# Patient Record
Sex: FEMALE | Race: WHITE | NOT HISPANIC OR LATINO | Employment: UNEMPLOYED | URBAN - METROPOLITAN AREA
[De-identification: names, ages, dates, MRNs, and addresses within clinical notes are randomized per-mention and may not be internally consistent; named-entity substitution may affect disease eponyms.]

---

## 2017-01-03 DIAGNOSIS — M79.639 PAIN OF FOREARM: ICD-10-CM

## 2017-01-04 ENCOUNTER — HOSPITAL ENCOUNTER (OUTPATIENT)
Dept: RADIOLOGY | Facility: HOSPITAL | Age: 62
Discharge: HOME | End: 2017-01-04
Attending: INTERNAL MEDICINE
Payer: COMMERCIAL

## 2017-01-04 ENCOUNTER — TRANSCRIBE ORDERS (OUTPATIENT)
Dept: RADIOLOGY | Facility: HOSPITAL | Age: 62
End: 2017-01-04

## 2017-01-04 DIAGNOSIS — M79.639 PAIN OF FOREARM: ICD-10-CM

## 2017-01-04 PROCEDURE — 73080 X-RAY EXAM OF ELBOW: CPT

## 2017-01-04 PROCEDURE — 73090 X-RAY EXAM OF FOREARM: CPT

## 2017-02-06 ENCOUNTER — HOSPITAL ENCOUNTER (OUTPATIENT)
Dept: RADIOLOGY | Age: 62
Discharge: HOME | End: 2017-02-06
Payer: COMMERCIAL

## 2017-02-06 DIAGNOSIS — Z12.31 ENCOUNTER FOR SCREENING MAMMOGRAM FOR MALIGNANT NEOPLASM OF BREAST: ICD-10-CM

## 2017-02-06 PROCEDURE — G0202 SCR MAMMO BI INCL CAD: HCPCS

## 2017-11-01 DIAGNOSIS — E55.9 VITAMIN D DEFICIENCY: ICD-10-CM

## 2017-11-01 DIAGNOSIS — Z12.11 ENCOUNTER FOR SCREENING FOR MALIGNANT NEOPLASM OF COLON: ICD-10-CM

## 2017-11-01 DIAGNOSIS — R26.89 OTHER ABNORMALITIES OF GAIT AND MOBILITY: ICD-10-CM

## 2017-11-01 DIAGNOSIS — R19.5 OTHER FECAL ABNORMALITIES: ICD-10-CM

## 2017-11-01 DIAGNOSIS — E78.5 HYPERLIPIDEMIA: ICD-10-CM

## 2017-11-01 DIAGNOSIS — M81.0 AGE-RELATED OSTEOPOROSIS WITHOUT CURRENT PATHOLOGICAL FRACTURE: ICD-10-CM

## 2017-11-01 DIAGNOSIS — K21.9 GASTRO-ESOPHAGEAL REFLUX DISEASE WITHOUT ESOPHAGITIS: ICD-10-CM

## 2017-11-09 ENCOUNTER — APPOINTMENT (OUTPATIENT)
Dept: LAB | Facility: CLINIC | Age: 62
End: 2017-11-09
Payer: COMMERCIAL

## 2017-11-09 DIAGNOSIS — K21.9 GASTRO-ESOPHAGEAL REFLUX DISEASE WITHOUT ESOPHAGITIS: ICD-10-CM

## 2017-11-09 DIAGNOSIS — R26.89 OTHER ABNORMALITIES OF GAIT AND MOBILITY: ICD-10-CM

## 2017-11-09 DIAGNOSIS — Z12.11 ENCOUNTER FOR SCREENING FOR MALIGNANT NEOPLASM OF COLON: ICD-10-CM

## 2017-11-09 DIAGNOSIS — E55.9 VITAMIN D DEFICIENCY: ICD-10-CM

## 2017-11-09 DIAGNOSIS — E78.5 HYPERLIPIDEMIA: ICD-10-CM

## 2017-11-09 LAB
25(OH)D3 SERPL-MCNC: 73.6 NG/ML (ref 30–100)
ALBUMIN SERPL BCP-MCNC: 4 G/DL (ref 3.5–5)
ALP SERPL-CCNC: 64 U/L (ref 46–116)
ALT SERPL W P-5'-P-CCNC: 29 U/L (ref 12–78)
ANION GAP SERPL CALCULATED.3IONS-SCNC: 4 MMOL/L (ref 4–13)
AST SERPL W P-5'-P-CCNC: 31 U/L (ref 5–45)
BACTERIA UR QL AUTO: ABNORMAL /HPF
BASOPHILS # BLD AUTO: 0.06 THOUSANDS/ΜL (ref 0–0.1)
BASOPHILS NFR BLD AUTO: 1 % (ref 0–1)
BILIRUB SERPL-MCNC: 0.34 MG/DL (ref 0.2–1)
BILIRUB UR QL STRIP: NEGATIVE
BUN SERPL-MCNC: 13 MG/DL (ref 5–25)
CALCIUM SERPL-MCNC: 9.8 MG/DL (ref 8.3–10.1)
CHLORIDE SERPL-SCNC: 102 MMOL/L (ref 100–108)
CHOLEST SERPL-MCNC: 186 MG/DL (ref 50–200)
CLARITY UR: CLEAR
CO2 SERPL-SCNC: 32 MMOL/L (ref 21–32)
COLOR UR: YELLOW
CREAT SERPL-MCNC: 0.67 MG/DL (ref 0.6–1.3)
EOSINOPHIL # BLD AUTO: 0.14 THOUSAND/ΜL (ref 0–0.61)
EOSINOPHIL NFR BLD AUTO: 3 % (ref 0–6)
ERYTHROCYTE [DISTWIDTH] IN BLOOD BY AUTOMATED COUNT: 13.4 % (ref 11.6–15.1)
GFR SERPL CREATININE-BSD FRML MDRD: 97 ML/MIN/1.73SQ M
GLUCOSE P FAST SERPL-MCNC: 73 MG/DL (ref 65–99)
GLUCOSE UR STRIP-MCNC: NEGATIVE MG/DL
HCT VFR BLD AUTO: 43.8 % (ref 34.8–46.1)
HDLC SERPL-MCNC: 75 MG/DL (ref 40–60)
HGB BLD-MCNC: 14.4 G/DL (ref 11.5–15.4)
HGB UR QL STRIP.AUTO: NEGATIVE
HYALINE CASTS #/AREA URNS LPF: ABNORMAL /LPF
KETONES UR STRIP-MCNC: NEGATIVE MG/DL
LDLC SERPL CALC-MCNC: 102 MG/DL (ref 0–100)
LEUKOCYTE ESTERASE UR QL STRIP: ABNORMAL
LYMPHOCYTES # BLD AUTO: 1.54 THOUSANDS/ΜL (ref 0.6–4.47)
LYMPHOCYTES NFR BLD AUTO: 33 % (ref 14–44)
MCH RBC QN AUTO: 30.6 PG (ref 26.8–34.3)
MCHC RBC AUTO-ENTMCNC: 32.9 G/DL (ref 31.4–37.4)
MCV RBC AUTO: 93 FL (ref 82–98)
MONOCYTES # BLD AUTO: 0.46 THOUSAND/ΜL (ref 0.17–1.22)
MONOCYTES NFR BLD AUTO: 10 % (ref 4–12)
NEUTROPHILS # BLD AUTO: 2.48 THOUSANDS/ΜL (ref 1.85–7.62)
NEUTS SEG NFR BLD AUTO: 53 % (ref 43–75)
NITRITE UR QL STRIP: NEGATIVE
NON-SQ EPI CELLS URNS QL MICRO: ABNORMAL /HPF
NRBC BLD AUTO-RTO: 0 /100 WBCS
PH UR STRIP.AUTO: 7.5 [PH] (ref 4.5–8)
PLATELET # BLD AUTO: 284 THOUSANDS/UL (ref 149–390)
PMV BLD AUTO: 10.5 FL (ref 8.9–12.7)
POTASSIUM SERPL-SCNC: 4 MMOL/L (ref 3.5–5.3)
PROT SERPL-MCNC: 7.4 G/DL (ref 6.4–8.2)
PROT UR STRIP-MCNC: NEGATIVE MG/DL
RBC # BLD AUTO: 4.71 MILLION/UL (ref 3.81–5.12)
RBC #/AREA URNS AUTO: ABNORMAL /HPF
SODIUM SERPL-SCNC: 138 MMOL/L (ref 136–145)
SP GR UR STRIP.AUTO: 1.01 (ref 1–1.03)
T4 FREE SERPL-MCNC: 0.91 NG/DL (ref 0.76–1.46)
TRIGL SERPL-MCNC: 43 MG/DL
TSH SERPL DL<=0.05 MIU/L-ACNC: 1.4 UIU/ML (ref 0.36–3.74)
UROBILINOGEN UR QL STRIP.AUTO: 0.2 E.U./DL
VIT B12 SERPL-MCNC: 982 PG/ML (ref 100–900)
WBC # BLD AUTO: 4.68 THOUSAND/UL (ref 4.31–10.16)
WBC #/AREA URNS AUTO: ABNORMAL /HPF

## 2017-11-09 PROCEDURE — 80053 COMPREHEN METABOLIC PANEL: CPT

## 2017-11-09 PROCEDURE — 85025 COMPLETE CBC W/AUTO DIFF WBC: CPT

## 2017-11-09 PROCEDURE — 84439 ASSAY OF FREE THYROXINE: CPT

## 2017-11-09 PROCEDURE — 80061 LIPID PANEL: CPT

## 2017-11-09 PROCEDURE — 82306 VITAMIN D 25 HYDROXY: CPT

## 2017-11-09 PROCEDURE — 84443 ASSAY THYROID STIM HORMONE: CPT

## 2017-11-09 PROCEDURE — 81001 URINALYSIS AUTO W/SCOPE: CPT

## 2017-11-09 PROCEDURE — 36415 COLL VENOUS BLD VENIPUNCTURE: CPT

## 2017-11-09 PROCEDURE — 82607 VITAMIN B-12: CPT

## 2017-11-10 ENCOUNTER — APPOINTMENT (OUTPATIENT)
Dept: LAB | Facility: CLINIC | Age: 62
End: 2017-11-10
Payer: COMMERCIAL

## 2017-11-10 DIAGNOSIS — K21.9 GASTRO-ESOPHAGEAL REFLUX DISEASE WITHOUT ESOPHAGITIS: ICD-10-CM

## 2017-11-10 DIAGNOSIS — Z12.11 ENCOUNTER FOR SCREENING FOR MALIGNANT NEOPLASM OF COLON: ICD-10-CM

## 2017-11-10 DIAGNOSIS — E55.9 VITAMIN D DEFICIENCY: ICD-10-CM

## 2017-11-10 DIAGNOSIS — E78.5 HYPERLIPIDEMIA: ICD-10-CM

## 2017-11-10 LAB — HEMOCCULT STL QL IA: POSITIVE

## 2017-11-10 PROCEDURE — G0328 FECAL BLOOD SCRN IMMUNOASSAY: HCPCS

## 2017-11-16 ENCOUNTER — ALLSCRIPTS OFFICE VISIT (OUTPATIENT)
Dept: OTHER | Facility: OTHER | Age: 62
End: 2017-11-16

## 2018-01-16 NOTE — PROGRESS NOTES
Assessment    1  Borderline hyperlipidemia (272 4) (E78 5)    Plan  Health Maintenance    · EKG/ECG- POC; Status:Complete - Retrospective By Protocol Authorization;   Done:  [id number] 01:19PM    Patient was instructed about dietary adjustments to compensate for her mild hyperlipidemia  Discussion/Summary  In summary this is an annual physical examination for this 53-year-old female patient  She presents today with no active complaints or medical issues  Her physical examination appears to be very stable with no evidence of any significant changes from last year's examination  She has a mild elevation in her total cholesterol and LDL compared to one year ago we discussed the dietary adjustments to compensate for this  She already is in a healthy body mass index range and also has regular exercise habits  See her in one years time for her next regular maintenance examination  She was provided with a flu vaccine today  Chief Complaint  PATIENT IS HERE FOR A YEARLY PHYSICAL  History of Present Illness  , Adult Female: The patient is being seen for a health maintenance evaluation  The last health maintenance visit was 1 year(s) ago  Social History: Household members include spouse  She is   The patient has never smoked cigarettes  She reports occasional alcohol use  The patient has no concerns about alcohol abuse  General Health: The patient's health since the last visit is described as good  She has regular dental visits  She denies vision problems  She denies hearing loss  Immunizations status: up to date  Lifestyle:  She consumes a diverse and healthy diet  She does not have any weight concerns  She exercises regularly  She does not use tobacco  She denies alcohol use  She denies drug use  Screening: cancer screening reviewed and current  metabolic screening reviewed and current  risk screening reviewed and current  Active Problems    1   Abdominal pain, RLQ (right lower quadrant) (789 03) (R10 31)   2  Allergy To Gluten-containing Products (V15 05)   3  Anxiety (300 00) (F41 9)   4  Arthralgia of temporomandibular joint (524 62) (M26 62)   5  Borderline hyperlipidemia (272 4) (E78 5)   6  Colon cancer screening (V76 51) (Z12 11)   7  Encounter for screening mammogram for malignant neoplasm of breast (V76 12)   (Z12 31)   8  Esophageal reflux (530 81) (K21 9)   9  Herpes simplex infection (054 9) (B00 9)   10  Vitamin D deficiency (268 9) (E55 9)    Past Medical History    · History of Allergic Contact Dermatitis (692 9)   · History of Allergy to insect bites and stings (V15 06) (Z91 038)   · History of Cellulitis Of Left Lower Extremity (682 9)   · History of acute bronchitis (V12 69) (Z87 09)   · History of conjunctivitis (V12 49) (Z86 69)   · History of laryngitis (V12 69) (Z87 09)   · History of pharyngitis (V12 69) (Z87 09)   · History of Pain in joint of right shoulder region (719 41) (M25 511)   · History of Palpitations (785 1) (R00 2)   · History of Pelvic and perineal pain (625 9) (R10 2)   · History of Pneumonia (V12 61)   · History of Tick Bite - Tick Has Been Removed   · History of Urinary Tract Infection (V13 02)    Surgical History    · History of  Section   · History of Oral Surgery Tooth Extraction    Family History  Mother    · Family history of Glaucoma (V19 11)   · Family history of Heart Disease (V17 49)   · Family history of Hypertension (V17 49)  Family History    · Family history of Atrial Fibrillation   · Family history of Cancer   · Family history of Heart Disease (V17 49)   · Family history of Hypertension (V17 49)   · Family history of Parkinson Disease    Social History    · Alcohol Use (History)   · Daily Coffee Consumption (2  Cups/Day)   · Exercising Regularly   · Never A Smoker    Current Meds   1  Famciclovir 500 MG Oral Tablet; use as directed;    Therapy: 15GCQ3021 to (Evaluate:2014)  Requested for: 15DAF3205; Last   RI:[id number] Ordered    Allergies    1  No Known Drug Allergies    Vitals   Recorded: 17EPN4399 56:31NU   Systolic 518   Diastolic 76   Heart Rate 57   Respiration 14   Temperature 97 5 F   O2 Saturation 95   Height 5 ft 4 in   Weight 124 lb 0 64 oz   BMI Calculated 21 29   BSA Calculated 1 6     Physical Exam    Constitutional   General appearance: No acute distress, well appearing and well nourished  Head and Face   Head and face: Normal     Eyes   Conjunctiva and lids: No swelling, erythema or discharge  Pupils and irises: Equal, round, reactive to light  Ophthalmoscopic examination: Normal fundi and optic discs  Ears, Nose, Mouth, and Throat   External inspection of ears and nose: Normal     Otoscopic examination: Tympanic membranes translucent with normal light reflex  Canals patent without erythema  Nasal mucosa, septum, and turbinates: Normal without edema or erythema  Lips, teeth, and gums: Normal, good dentition  Oropharynx: Normal with no erythema, edema, exudate or lesions  Neck   Neck: Supple, symmetric, trachea midline, no masses  Thyroid: Normal, no thyromegaly  Pulmonary   Respiratory effort: No increased work of breathing or signs of respiratory distress  Percussion of chest: Normal     Auscultation of lungs: Clear to auscultation  Cardiovascular   Auscultation of heart: Normal rate and rhythm, normal S1 and S2, no murmurs  Carotid pulses: 2+ bilaterally  Pedal pulses: 2+ bilaterally  Peripheral vascular exam: Normal     Examination of extremities for edema and/or varicosities: Normal     Abdomen   Abdomen: Non-tender, no masses  Liver and spleen: No hepatomegaly or splenomegaly  Lymphatic   Palpation of lymph nodes in neck: No lymphadenopathy  Musculoskeletal   Gait and station: Normal     Digits and nails: Normal without clubbing or cyanosis      Joints, bones, and muscles: Normal     Range of motion: Normal     Stability: Normal     Muscle strength/tone: Normal     Skin   Skin and subcutaneous tissue: Normal without rashes or lesions  Neurologic   Cranial nerves: Cranial nerves II-XII intact  Cortical function: Normal mental status  Reflexes: 2+ and symmetric  Sensation: No sensory loss  Coordination: Normal finger to nose and heel to shin  Psychiatric   Judgment and insight: Normal     Orientation to person, place, and time: Normal     Recent and remote memory: Intact      Mood and affect: Normal        Results/Data  EKG/ECG- POC [id number] 01:19PM [facility]     Test Name Result Flag Reference   EKG/ECG 09/29/2016         Signatures   Electronically signed by : JEN Cheng ; Sep 29 2016  2:21PM EST                       (Author)

## 2018-01-18 NOTE — PROGRESS NOTES
Assessment    1  Heme positive stool (792 1) (R19 5)   2  Borderline hyperlipidemia (272 4) (E78 5)   3  Eczema (692 9) (L30 9)    Plan  Age related osteoporosis, Heme positive stool    · * DXA BONE DENSITY SPINE HIP AND PELVIS; Status:Hold For - Scheduling;  Requested for:16Nov2017;   Heme positive stool    · 2 - *EYVAZ&REILLYCOL ( COLORECTAL SURGERY, GASTROENTEROLOGY)  Co-Management  *  Status: Hold For - Scheduling   Requested for: 31HTH8921  Care Summary provided  : Yes    1  Heme-positive stool recommend colonoscopy the patient is referred to colon and rectal service at [facility] for this procedure  2   Borderline hyperlipidemia LDL is 102 with a total cholesterol 186 and HDL 75  Dietary adjustments recommended to the patient  3   Eczema of the hands recommend mometasone daily and moist arising cream such as Eucerin or Oak use working hands  Discussion/Summary    In summary the patient presents today for an annual health maintenance exam  She is doing well her physical examination appears to be stable echocardiogram is also stable  Reviewed with the patient her full set of blood work urinalysis and Hemoccult testing  The Hemoccult study was positive and she was referred on for a colonoscopy  She is current with her gyn and mammogram screening  We did provide her with a request for a DEXA scan to assess her bone density and fracture risk  total time of encounter was 45 minutes and 30 minutes was spent counseling  Chief Complaint  patient is here for a yearly physical       History of Present Illness  HM, Adult Female: The patient is being seen for a health maintenance evaluation  The last health maintenance visit was 1 year(s) ago  Social History: Household members include spouse  She is   The patient has never smoked cigarettes  She reports occasional alcohol use  The patient has no concerns about alcohol abuse  She has never used illicit drugs  General Health:  The patient's health since the last visit is described as good  She has regular dental visits  She denies vision problems  She denies hearing loss  Immunizations status: up to date  Lifestyle:  She consumes a diverse and healthy diet  She does not have any weight concerns  She exercises regularly  She does not use tobacco  She denies alcohol use  She denies drug use  Screening:   HPI: This 80-year-old female patient presents today for an annual health maintenance examination  She has no specific medical complaints  Review of Systems    Constitutional: No fever, no chills, feels well, no tiredness, no recent weight gain or weight loss  Eyes: No complaints of eye pain, no red eyes, no eyesight problems, no discharge, no dry eyes, no itching of eyes  ENT: no complaints of earache, no loss of hearing, no nose bleeds, no nasal discharge, no sore throat, no hoarseness  Cardiovascular: No complaints of slow heart rate, no fast heart rate, no chest pain, no palpitations, no leg claudication, no lower extremity edema  Respiratory: No complaints of shortness of breath, no wheezing, no cough, no SOB on exertion, no orthopnea, no PND  Gastrointestinal: No complaints of abdominal pain, no constipation, no nausea or vomiting, no diarrhea, no bloody stools  Genitourinary: No complaints of dysuria, no incontinence, no pelvic pain, no dysmenorrhea, no vaginal discharge or bleeding  Musculoskeletal: No complaints of arthralgias, no myalgias, no joint swelling or stiffness, no limb pain or swelling  Integumentary: Rash of the hands which is flaky and dry  Neurological: No complaints of headache, no confusion, no convulsions, no numbness, no dizziness or fainting, no tingling, no limb weakness, no difficulty walking  Psychiatric: Not suicidal, no sleep disturbance, no anxiety or depression, no change in personality, no emotional problems     Endocrine: No complaints of proptosis, no hot flashes, no muscle weakness, no deepening of the voice, no feelings of weakness  Hematologic/Lymphatic: No complaints of swollen glands, no swollen glands in the neck, does not bleed easily, does not bruise easily  Active Problems    1  Allergy To Gluten-containing Products (V15 05)   2  Anxiety (300 00) (F41 9)   3  Arthralgia of temporomandibular joint (524 62) (M26 629)   4  Borderline hyperlipidemia (272 4) (E78 5)   5  Colon cancer screening (V76 51) (Z12 11)   6  Encounter for screening mammogram for malignant neoplasm of breast (V76 12)   (Z12 31)   7  Esophageal reflux (530 81) (K21 9)   8  Forearm pain (729 5) (M79 639)   9  Need for immunization against influenza (V04 81) (Z23)   10  Rapid fatigue of gait (781 2) (R26 89)   11   Vitamin D deficiency (268 9) (E55 9)    Past Medical History    · History of Abdominal pain, RLQ (right lower quadrant) (789 03) (R10 31)   · History of Allergic Contact Dermatitis (692 9)   · History of Allergy to insect bites and stings (V15 06) (Z91 038)   · History of Cellulitis Of Left Lower Extremity (682 9)   · History of acute bronchitis (V12 69) (Z87 09)   · History of conjunctivitis (V12 49) (Z86 69)   · History of herpes simplex infection (V12 09) (Z86 19)   · History of laryngitis (V12 69) (Z87 09)   · History of pharyngitis (V12 69) (Z87 09)   · History of Pain in joint of right shoulder region (719 41) (M25 511)   · History of Palpitations (785 1) (R00 2)   · History of Pelvic and perineal pain (625 9) (R10 2)   · History of Pneumonia (V12 61)   · History of Tick Bite - Tick Has Been Removed   · History of Urinary Tract Infection (V13 02)    Surgical History    · History of  Section   · History of Oral Surgery Tooth Extraction    Family History  Mother    · Family history of Glaucoma (V19 11)   · Family history of Heart Disease (V17 49)   · Family history of Hypertension (V17 49)  Family History    · Family history of Atrial Fibrillation   · Family history of Cancer   · Family history of Heart Disease (V17 49)   · Family history of Hypertension (V17 49)   · Family history of Parkinson Disease    Social History    · Alcohol Use (History)   · Daily Coffee Consumption (2  Cups/Day)   · Exercising Regularly   · Never A Smoker    Current Meds   1  ALPRAZolam 0 25 MG Oral Tablet; take 1 tablet every 6 to 8 hours as needed; Therapy: 98GJF7543 to (Evaluate:06Apr2017); Last Rx:06Jan2017 Ordered    Allergies    1  No Known Drug Allergies    Vitals   Recorded: 08ATL4783 02:28PM   Temperature 98 9 F   Heart Rate 73   Respiration 14   Systolic 155   Diastolic 68   Height 5 ft 4 in   Weight 127 lb 0 2 oz   BMI Calculated 21 8   BSA Calculated 1 61   O2 Saturation 99     Physical Exam    Constitutional   General appearance: No acute distress, well appearing and well nourished  Head and Face   Head and face: Normal     Eyes   Conjunctiva and lids: No swelling, erythema or discharge  Pupils and irises: Equal, round, reactive to light  Ophthalmoscopic examination: Normal fundi and optic discs  Ears, Nose, Mouth, and Throat   External inspection of ears and nose: Normal     Otoscopic examination: Tympanic membranes translucent with normal light reflex  Canals patent without erythema  Hearing: Normal     Nasal mucosa, septum, and turbinates: Normal without edema or erythema  Lips, teeth, and gums: Normal, good dentition  Oropharynx: Normal with no erythema, edema, exudate or lesions  Neck   Neck: Supple, symmetric, trachea midline, no masses  Thyroid: Normal, no thyromegaly  Pulmonary   Respiratory effort: No increased work of breathing or signs of respiratory distress  Percussion of chest: Normal     Auscultation of lungs: Clear to auscultation  Cardiovascular   Auscultation of heart: Normal rate and rhythm, normal S1 and S2, no murmurs  Carotid pulses: 2+ bilaterally  Pedal pulses: 2+ bilaterally      Peripheral vascular exam: Normal     Examination of extremities for edema and/or varicosities: Normal     Abdomen   Abdomen: Non-tender, no masses  Liver and spleen: No hepatomegaly or splenomegaly  Lymphatic   Palpation of lymph nodes in neck: No lymphadenopathy  Musculoskeletal   Gait and station: Normal     Digits and nails: Normal without clubbing or cyanosis  Joints, bones, and muscles: Normal     Range of motion: Normal     Stability: Normal     Muscle strength/tone: Normal     Skin   Examination of the skin for lesions: Abnormal   Eczema of the hands  Neurologic   Cranial nerves: Cranial nerves II-XII intact  Cortical function: Normal mental status  Reflexes: 2+ and symmetric  Sensation: No sensory loss  Coordination: Normal finger to nose and heel to shin  Psychiatric   Judgment and insight: Normal     Orientation to person, place, and time: Normal     Recent and remote memory: Intact  Mood and affect: Normal        Results/Data  PHQ-2 Adult Depression Screening 04PJR6531 02:32PM User, [employer]     Test Name Result Flag Reference   PHQ-2 Adult Depression Score 0     Over the last two weeks, how often have you been bothered by any of the following problems? Little interest or pleasure in doing things: Not at all - 0  Feeling down, depressed, or hopeless: Not at all - 0   PHQ-2 Adult Depression Screening Negative       Falls Risk Assessment (Dx Z13 89 Screen for Neurologic Disorder) 02WMV2987 02:32PM User, [employer]     Test Name Result Flag Reference   Falls Risk      No falls in the past year       Future Appointments    Date/Time Provider Specialty Site   11/27/2018 02:00 PM JEN Mendieta   Internal Medicine [address] MED     Signatures   Electronically signed by : JEN Kelly ; Nov 16 2017  7:34PM EST                       (Author)

## 2018-01-22 VITALS
HEIGHT: 64 IN | OXYGEN SATURATION: 99 % | BODY MASS INDEX: 21.68 KG/M2 | RESPIRATION RATE: 14 BRPM | DIASTOLIC BLOOD PRESSURE: 68 MMHG | HEART RATE: 73 BPM | WEIGHT: 127.01 LBS | SYSTOLIC BLOOD PRESSURE: 122 MMHG | TEMPERATURE: 98.9 F

## 2018-03-19 ENCOUNTER — OFFICE VISIT (OUTPATIENT)
Dept: OBGYN CLINIC | Facility: CLINIC | Age: 63
End: 2018-03-19
Payer: COMMERCIAL

## 2018-03-19 VITALS
HEIGHT: 65 IN | SYSTOLIC BLOOD PRESSURE: 130 MMHG | WEIGHT: 130 LBS | BODY MASS INDEX: 21.66 KG/M2 | DIASTOLIC BLOOD PRESSURE: 84 MMHG

## 2018-03-19 DIAGNOSIS — Z12.39 BREAST CANCER SCREENING: ICD-10-CM

## 2018-03-19 DIAGNOSIS — Z01.419 ENCOUNTER FOR ANNUAL ROUTINE GYNECOLOGICAL EXAMINATION: Primary | ICD-10-CM

## 2018-03-19 PROCEDURE — S0610 ANNUAL GYNECOLOGICAL EXAMINA: HCPCS | Performed by: OBSTETRICS & GYNECOLOGY

## 2018-03-19 NOTE — PROGRESS NOTES
Assessment/Plan:    Pap smear with HPV done as well as annual   Encouraged self-breast examination as well as calcium supplementation  Continue annual mammogram, discussed 3D mammography given breast density  She will check to see if this is covered by her insurance  Discussed treatment options for urge incontinence  At this point she would like to just monitor her symptoms through the year  She will return to office in 1 year or as needed  No problem-specific Assessment & Plan notes found for this encounter  Diagnoses and all orders for this visit:    Encounter for annual routine gynecological examination  -     Thinprep Pap and HPV mRNA E6/E7    Breast cancer screening  -     Mammo screening bilateral w 3d & cad; Future    Other orders  -     VITAMIN C, CALCIUM ASCORBATE, PO; Take by mouth  -     Cholecalciferol 1000 units capsule; Take by mouth  -     b complex vitamins tablet; Take 1 tablet by mouth daily          Subjective:      Patient ID: Oneal Leavitt is a 63 y o  female  HPI    This is a 46-year-old female  ( x1,  x1) presents as a new patient for annual well gyn exam   She offers no complaints today  She went through menopause at age 54  She was never on hormone replacement therapy  She is sexually active and has been in a monogamous relationship with her  for 38 years  All her Pap smears have been normal  She is due for her mammogram   She did have a screening colonoscopy 2 years ago which was normal  She denies any changes in bowel or bladder function  The following portions of the patient's history were reviewed and updated as appropriate: allergies, current medications, past family history, past medical history, past social history, past surgical history and problem list     Review of Systems   Constitutional: Negative for fatigue, fever and unexpected weight change  Respiratory: Negative for cough, chest tightness, shortness of breath and wheezing  Cardiovascular: Negative  Negative for chest pain and palpitations  Gastrointestinal: Negative  Negative for abdominal distention, abdominal pain, blood in stool, constipation, diarrhea, nausea and vomiting  Genitourinary: Negative  Negative for difficulty urinating, dyspareunia, dysuria, flank pain, frequency, genital sores, hematuria, pelvic pain, urgency, vaginal bleeding, vaginal discharge and vaginal pain  Skin: Negative for rash  Objective:      /84   Ht 5' 5" (1 651 m)   Wt 59 kg (130 lb)   Breastfeeding? No   BMI 21 63 kg/m²          Physical Exam   Constitutional: She appears well-developed and well-nourished  Cardiovascular: Normal rate and regular rhythm  Pulmonary/Chest: Effort normal and breath sounds normal  Right breast exhibits no inverted nipple, no mass, no nipple discharge and no skin change  Left breast exhibits no inverted nipple, no mass, no nipple discharge and no skin change  Abdominal: Soft  Bowel sounds are normal  She exhibits no distension  There is no tenderness  There is no rebound and no guarding  Genitourinary: Rectum normal, vagina normal and uterus normal  There is no lesion on the right labia  There is no lesion on the left labia  Cervix exhibits no discharge and no friability  Right adnexum displays no mass, no tenderness and no fullness  Left adnexum displays no mass, no tenderness and no fullness  No vaginal discharge found

## 2018-03-20 LAB
CLINICAL INFO: NORMAL
DATE PREVIOUS BX: NORMAL
HPV E6+E7 MRNA CVX QL NAA+PROBE: NOT DETECTED
LMP START DATE: NORMAL
SL AMB PREV. PAP:: NORMAL
SPECIMEN SOURCE CVX/VAG CYTO: NORMAL
STAT OF ADQ CVX/VAG CYTO-IMP: NORMAL

## 2018-04-05 ENCOUNTER — TRANSCRIBE ORDERS (OUTPATIENT)
Dept: LAB | Facility: CLINIC | Age: 63
End: 2018-04-05

## 2018-04-09 ENCOUNTER — HOSPITAL ENCOUNTER (OUTPATIENT)
Dept: RADIOLOGY | Age: 63
Discharge: HOME | End: 2018-04-09
Payer: COMMERCIAL

## 2018-04-09 DIAGNOSIS — M81.0 AGE-RELATED OSTEOPOROSIS WITHOUT CURRENT PATHOLOGICAL FRACTURE: ICD-10-CM

## 2018-04-09 DIAGNOSIS — R19.5 OTHER FECAL ABNORMALITIES: ICD-10-CM

## 2018-04-09 DIAGNOSIS — Z12.39 BREAST CANCER SCREENING: ICD-10-CM

## 2018-04-09 PROCEDURE — 77067 SCR MAMMO BI INCL CAD: CPT

## 2018-04-09 PROCEDURE — 77080 DXA BONE DENSITY AXIAL: CPT

## 2018-04-09 PROCEDURE — 77063 BREAST TOMOSYNTHESIS BI: CPT

## 2018-04-13 ENCOUNTER — TELEPHONE (OUTPATIENT)
Dept: OBGYN CLINIC | Facility: CLINIC | Age: 63
End: 2018-04-13

## 2018-04-13 NOTE — TELEPHONE ENCOUNTER
----- Message from Radha Dear sent at 4/11/2018 10:10 AM EDT -----  Regarding: Test Results Question  Contact: [phone]  Dr Fanta Burdick when I will receive Test results of mammogram from 4/9th? Nothing showing in MyChart  Other test results show up quickly      Thank you,  Colette Lin

## 2018-04-13 NOTE — TELEPHONE ENCOUNTER
Lm pt's as re: 3D mgram results 4/9/18 - "extremely dense" breasts, no interval change - to continue 3D mgram annually, can have ABUS

## 2018-04-17 NOTE — TELEPHONE ENCOUNTER
Pt inforrmed of mgram results & pt also saw on My Chart - she will recall office if wants dx & CPT codes for ABUS & will continue 3D annual screening mgram

## 2018-04-18 ENCOUNTER — TELEPHONE (OUTPATIENT)
Dept: INTERNAL MEDICINE CLINIC | Facility: CLINIC | Age: 63
End: 2018-04-18

## 2018-04-25 ENCOUNTER — TELEPHONE (OUTPATIENT)
Dept: INTERNAL MEDICINE CLINIC | Facility: CLINIC | Age: 63
End: 2018-04-25

## 2018-04-25 NOTE — TELEPHONE ENCOUNTER
Pt wanted to know what she should do with the results of her dexa scan (med advice)  She can be reached at [phone]

## 2018-07-16 ENCOUNTER — TELEPHONE (OUTPATIENT)
Dept: INTERNAL MEDICINE CLINIC | Facility: CLINIC | Age: 63
End: 2018-07-16

## 2018-08-01 ENCOUNTER — TELEPHONE (OUTPATIENT)
Dept: INTERNAL MEDICINE CLINIC | Facility: CLINIC | Age: 63
End: 2018-08-01

## 2018-08-01 NOTE — TELEPHONE ENCOUNTER
Yesterday she Sat down for lunch, vision blurred in certain spots  Drank some water and felt better  But the episode lasted about 20 minutes  Is this something to be concerned with?

## 2018-08-03 ENCOUNTER — TELEPHONE (OUTPATIENT)
Dept: INTERNAL MEDICINE CLINIC | Facility: CLINIC | Age: 63
End: 2018-08-03

## 2018-08-03 DIAGNOSIS — M54.5 ACUTE BILATERAL LOW BACK PAIN, WITH SCIATICA PRESENCE UNSPECIFIED: Primary | ICD-10-CM

## 2018-08-03 NOTE — PROGRESS NOTES
Patient called today with symptoms of low back pain would like to have x-rays performed will order thoracic and lumbar x-ray films follow-up with patient

## 2018-08-03 NOTE — TELEPHONE ENCOUNTER
Patient is having low back pain, she said she discussed it with you and you told her if it persists she should contact you for an order for an xray  Would you please order that so she can go have the xray done

## 2018-08-03 NOTE — TELEPHONE ENCOUNTER
Nilda please let the patient know that she can go to [facility] ([facility]) radiology and the orders are in the system   thanks

## 2019-01-17 ENCOUNTER — OFFICE VISIT (OUTPATIENT)
Dept: INTERNAL MEDICINE CLINIC | Facility: CLINIC | Age: 64
End: 2019-01-17
Payer: COMMERCIAL

## 2019-01-17 VITALS
WEIGHT: 125 LBS | OXYGEN SATURATION: 98 % | HEART RATE: 76 BPM | HEIGHT: 64 IN | TEMPERATURE: 97.7 F | BODY MASS INDEX: 21.34 KG/M2 | SYSTOLIC BLOOD PRESSURE: 118 MMHG | RESPIRATION RATE: 14 BRPM | DIASTOLIC BLOOD PRESSURE: 64 MMHG

## 2019-01-17 DIAGNOSIS — N30.00 ACUTE CYSTITIS WITHOUT HEMATURIA: ICD-10-CM

## 2019-01-17 DIAGNOSIS — E78.5 HYPERLIPIDEMIA, UNSPECIFIED HYPERLIPIDEMIA TYPE: Primary | ICD-10-CM

## 2019-01-17 DIAGNOSIS — R53.83 OTHER FATIGUE: ICD-10-CM

## 2019-01-17 DIAGNOSIS — M81.0 AGE-RELATED OSTEOPOROSIS WITHOUT CURRENT PATHOLOGICAL FRACTURE: ICD-10-CM

## 2019-01-17 PROCEDURE — 99396 PREV VISIT EST AGE 40-64: CPT | Performed by: INTERNAL MEDICINE

## 2019-01-17 RX ORDER — DIPHENOXYLATE HYDROCHLORIDE AND ATROPINE SULFATE 2.5; .025 MG/1; MG/1
1 TABLET ORAL DAILY
COMMUNITY

## 2019-01-17 NOTE — PROGRESS NOTES
Assessment/Plan:    Age related osteoporosis  Age-related osteoporosis the patient continues on vitamin D and calcium supplementation  She has suffered no fractures at this time  Regular weight-bearing exercise is encouraged  Hyperlipidemia  Mild hyperlipidemia by history  We have requested an updated lipid profile and reviewed healthy diet with the patient  Diagnoses and all orders for this visit:    Hyperlipidemia, unspecified hyperlipidemia type  -     Lipid panel; Future    Other fatigue  -     CBC and differential; Future  -     Comprehensive metabolic panel; Future    Acute cystitis without hematuria  -     UA w Reflex to Microscopic w Reflex to Culture -Lab Collect    Age-related osteoporosis without current pathological fracture    Other orders  -     Omega-3 Fatty Acids (FISH OIL PO); Take 2 g by mouth  -     multivitamin (THERAGRAN) TABS; Take 1 tablet by mouth daily  -     Probiotic Product (PROBIOTIC-10 PO); Take by mouth        Subjective:      Patient ID: Javier Garcia is a 66 y o  female  This 60-year-old female patient presents today for an annual health maintenance examination  She has no new complaints of a medical or physical nature  She remains active physically and is maintaining a healthy body weight  She tries to maintain a healthy diet  She is up-to-date on her gynecologic screening as well as colon cancer screening  Blood work has been requested will review the results with the patient when the testing has been completed  The following portions of the patient's history were reviewed and updated as appropriate:   She  has a past medical history of Allergic contact dermatitis; Allergic to insect bites and stings; Conjunctivitis; Herpes simplex infection; Laryngitis; Pharyngitis; and Pneumonia    She   Patient Active Problem List    Diagnosis Date Noted    Hyperlipidemia 01/17/2019    Age related osteoporosis 11/16/2017     She  has a past surgical history that includes  section and Mouth surgery  Her family history includes Atrial fibrillation in her family; Cancer in her family; Glaucoma in her mother; Heart disease in her family and mother; Hypertension in her family and mother; Parkinsonism in her family  She  reports that she has never smoked  She has never used smokeless tobacco  She reports that she drinks alcohol  She reports that she does not use drugs  Current Outpatient Prescriptions   Medication Sig Dispense Refill    b complex vitamins tablet Take 1 tablet by mouth daily      Cholecalciferol 1000 units capsule Take by mouth      multivitamin (THERAGRAN) TABS Take 1 tablet by mouth daily      Omega-3 Fatty Acids (FISH OIL PO) Take 2 g by mouth      Probiotic Product (PROBIOTIC-10 PO) Take by mouth      VITAMIN C, CALCIUM ASCORBATE, PO Take by mouth       No current facility-administered medications for this visit       Review of Systems   All other systems reviewed and are negative  Objective:      /64   Pulse 76   Temp 97 7 °F (36 5 °C)   Resp 14   Ht 5' 4" (1 626 m)   Wt 56 7 kg (125 lb)   SpO2 98%   BMI 21 46 kg/m²          Physical Exam   Constitutional: She is oriented to person, place, and time  Vital signs are normal  She appears well-developed and well-nourished  She is cooperative  No distress  HENT:   Head: Normocephalic and atraumatic  Right Ear: Hearing, tympanic membrane, external ear and ear canal normal    Left Ear: Hearing, tympanic membrane, external ear and ear canal normal    Nose: Nose normal  No mucosal edema  Mouth/Throat: Uvula is midline, oropharynx is clear and moist and mucous membranes are normal  No oropharyngeal exudate  Eyes: Pupils are equal, round, and reactive to light  Conjunctivae and lids are normal  Right eye exhibits no discharge  Left eye exhibits no discharge  Neck: No JVD present  Carotid bruit is not present  No tracheal deviation present  No thyromegaly present  Cardiovascular: Normal rate, regular rhythm, normal heart sounds and intact distal pulses  No murmur heard  Pulmonary/Chest: Effort normal and breath sounds normal  No respiratory distress  She has no wheezes  She has no rales  She exhibits no tenderness  Abdominal: Soft  Normal appearance and bowel sounds are normal  She exhibits no distension and no mass  There is no tenderness  There is no rebound and no guarding  Musculoskeletal: Normal range of motion  She exhibits no edema, tenderness or deformity  Lymphadenopathy:     She has no cervical adenopathy  Neurological: She is alert and oriented to person, place, and time  She has normal reflexes  She displays normal reflexes  No cranial nerve deficit  Skin: Skin is warm, dry and intact  No rash noted  She is not diaphoretic  No erythema  No pallor  Psychiatric: She has a normal mood and affect  Her speech is normal and behavior is normal  Judgment and thought content normal  Cognition and memory are normal    Vitals reviewed

## 2019-01-17 NOTE — ASSESSMENT & PLAN NOTE
Mild hyperlipidemia by history  We have requested an updated lipid profile and reviewed healthy diet with the patient

## 2019-01-17 NOTE — ASSESSMENT & PLAN NOTE
Age-related osteoporosis the patient continues on vitamin D and calcium supplementation  She has suffered no fractures at this time  Regular weight-bearing exercise is encouraged

## 2019-01-18 ENCOUNTER — APPOINTMENT (OUTPATIENT)
Dept: LAB | Facility: CLINIC | Age: 64
End: 2019-01-18
Payer: COMMERCIAL

## 2019-01-18 ENCOUNTER — TRANSCRIBE ORDERS (OUTPATIENT)
Dept: LAB | Facility: CLINIC | Age: 64
End: 2019-01-18

## 2019-01-18 DIAGNOSIS — E78.5 HYPERLIPIDEMIA, UNSPECIFIED HYPERLIPIDEMIA TYPE: ICD-10-CM

## 2019-01-18 DIAGNOSIS — R53.83 OTHER FATIGUE: ICD-10-CM

## 2019-01-18 LAB
ALBUMIN SERPL BCP-MCNC: 3.9 G/DL (ref 3.5–5)
ALP SERPL-CCNC: 55 U/L (ref 46–116)
ALT SERPL W P-5'-P-CCNC: 28 U/L (ref 12–78)
ANION GAP SERPL CALCULATED.3IONS-SCNC: 6 MMOL/L (ref 4–13)
AST SERPL W P-5'-P-CCNC: 25 U/L (ref 5–45)
BACTERIA UR QL AUTO: ABNORMAL /HPF
BASOPHILS # BLD AUTO: 0.05 THOUSANDS/ΜL (ref 0–0.1)
BASOPHILS NFR BLD AUTO: 1 % (ref 0–1)
BILIRUB SERPL-MCNC: 0.64 MG/DL (ref 0.2–1)
BILIRUB UR QL STRIP: NEGATIVE
BUN SERPL-MCNC: 16 MG/DL (ref 5–25)
CALCIUM SERPL-MCNC: 8.9 MG/DL (ref 8.3–10.1)
CHLORIDE SERPL-SCNC: 100 MMOL/L (ref 100–108)
CHOLEST SERPL-MCNC: 199 MG/DL (ref 50–200)
CLARITY UR: CLEAR
CO2 SERPL-SCNC: 28 MMOL/L (ref 21–32)
COLOR UR: YELLOW
CREAT SERPL-MCNC: 0.58 MG/DL (ref 0.6–1.3)
EOSINOPHIL # BLD AUTO: 0.06 THOUSAND/ΜL (ref 0–0.61)
EOSINOPHIL NFR BLD AUTO: 1 % (ref 0–6)
ERYTHROCYTE [DISTWIDTH] IN BLOOD BY AUTOMATED COUNT: 12.8 % (ref 11.6–15.1)
GFR SERPL CREATININE-BSD FRML MDRD: 100 ML/MIN/1.73SQ M
GLUCOSE P FAST SERPL-MCNC: 81 MG/DL (ref 65–99)
GLUCOSE UR STRIP-MCNC: NEGATIVE MG/DL
HCT VFR BLD AUTO: 40.3 % (ref 34.8–46.1)
HDLC SERPL-MCNC: 72 MG/DL (ref 40–60)
HGB BLD-MCNC: 13.4 G/DL (ref 11.5–15.4)
HGB UR QL STRIP.AUTO: ABNORMAL
HYALINE CASTS #/AREA URNS LPF: ABNORMAL /LPF
IMM GRANULOCYTES # BLD AUTO: 0.01 THOUSAND/UL (ref 0–0.2)
IMM GRANULOCYTES NFR BLD AUTO: 0 % (ref 0–2)
KETONES UR STRIP-MCNC: NEGATIVE MG/DL
LDLC SERPL CALC-MCNC: 119 MG/DL (ref 0–100)
LEUKOCYTE ESTERASE UR QL STRIP: ABNORMAL
LYMPHOCYTES # BLD AUTO: 1.81 THOUSANDS/ΜL (ref 0.6–4.47)
LYMPHOCYTES NFR BLD AUTO: 32 % (ref 14–44)
MCH RBC QN AUTO: 30.7 PG (ref 26.8–34.3)
MCHC RBC AUTO-ENTMCNC: 33.3 G/DL (ref 31.4–37.4)
MCV RBC AUTO: 92 FL (ref 82–98)
MONOCYTES # BLD AUTO: 0.53 THOUSAND/ΜL (ref 0.17–1.22)
MONOCYTES NFR BLD AUTO: 9 % (ref 4–12)
NEUTROPHILS # BLD AUTO: 3.16 THOUSANDS/ΜL (ref 1.85–7.62)
NEUTS SEG NFR BLD AUTO: 57 % (ref 43–75)
NITRITE UR QL STRIP: NEGATIVE
NON-SQ EPI CELLS URNS QL MICRO: ABNORMAL /HPF
NONHDLC SERPL-MCNC: 127 MG/DL
NRBC BLD AUTO-RTO: 0 /100 WBCS
PH UR STRIP.AUTO: 6.5 [PH] (ref 4.5–8)
PLATELET # BLD AUTO: 246 THOUSANDS/UL (ref 149–390)
PMV BLD AUTO: 10.4 FL (ref 8.9–12.7)
POTASSIUM SERPL-SCNC: 4 MMOL/L (ref 3.5–5.3)
PROT SERPL-MCNC: 6.8 G/DL (ref 6.4–8.2)
PROT UR STRIP-MCNC: NEGATIVE MG/DL
RBC # BLD AUTO: 4.36 MILLION/UL (ref 3.81–5.12)
RBC #/AREA URNS AUTO: ABNORMAL /HPF
SODIUM SERPL-SCNC: 134 MMOL/L (ref 136–145)
SP GR UR STRIP.AUTO: 1.01 (ref 1–1.03)
TRIGL SERPL-MCNC: 42 MG/DL
UROBILINOGEN UR QL STRIP.AUTO: 0.2 E.U./DL
WBC # BLD AUTO: 5.62 THOUSAND/UL (ref 4.31–10.16)
WBC #/AREA URNS AUTO: ABNORMAL /HPF

## 2019-01-18 PROCEDURE — 36415 COLL VENOUS BLD VENIPUNCTURE: CPT

## 2019-01-18 PROCEDURE — 80061 LIPID PANEL: CPT

## 2019-01-18 PROCEDURE — 81001 URINALYSIS AUTO W/SCOPE: CPT | Performed by: INTERNAL MEDICINE

## 2019-01-18 PROCEDURE — 85025 COMPLETE CBC W/AUTO DIFF WBC: CPT

## 2019-01-18 PROCEDURE — 80053 COMPREHEN METABOLIC PANEL: CPT

## 2019-01-21 ENCOUNTER — TELEPHONE (OUTPATIENT)
Dept: INTERNAL MEDICINE CLINIC | Facility: CLINIC | Age: 64
End: 2019-01-21

## 2019-01-21 NOTE — TELEPHONE ENCOUNTER
Call the patient back reviewed the blood work did recommend some changes to her diet pertaining to her cholesterol but otherwise everything looks stable

## 2019-03-26 ENCOUNTER — ANNUAL EXAM (OUTPATIENT)
Dept: OBGYN CLINIC | Facility: CLINIC | Age: 64
End: 2019-03-26
Payer: COMMERCIAL

## 2019-03-26 VITALS
DIASTOLIC BLOOD PRESSURE: 70 MMHG | WEIGHT: 123.2 LBS | HEIGHT: 64 IN | BODY MASS INDEX: 21.03 KG/M2 | SYSTOLIC BLOOD PRESSURE: 110 MMHG

## 2019-03-26 DIAGNOSIS — Z01.419 ENCOUNTER FOR ANNUAL ROUTINE GYNECOLOGICAL EXAMINATION: Primary | ICD-10-CM

## 2019-03-26 DIAGNOSIS — Z12.39 BREAST CANCER SCREENING: ICD-10-CM

## 2019-03-26 PROCEDURE — S0612 ANNUAL GYNECOLOGICAL EXAMINA: HCPCS | Performed by: OBSTETRICS & GYNECOLOGY

## 2019-03-26 NOTE — PROGRESS NOTES
Assessment/Plan:    Pap smear deferred due to low risk status  Encouraged self-breast examination as well as calcium supplementation  Continue annual mammogram, discussed 3D mammography  She will check to see if this is covered by her insurance  She will continue to follow-up with her primary care physician as scheduled  Return to office in 1 year or p r n  No problem-specific Assessment & Plan notes found for this encounter  Diagnoses and all orders for this visit:    Encounter for annual routine gynecological examination    Breast cancer screening  -     Mammo screening bilateral w 3d & cad; Future    Other orders  -     Magnesium 100 MG CAPS; Take by mouth          Subjective:      Patient ID: Jorge Alberto Greenberg is a 66 y o  female  HPI     This is a 79-year-old female  ( x1,  x1) presents for annual gyn exam   Patient went through menopause at age 54  She has never been on hormone replacement therapy  She denies any vaginal bleeding or spotting  No changes in bowel or bladder function  She is sexually active and has been in a monogamous relationship with her  for over 39 years  All her Pap smears have been normal   Last Pap smear 2018 within normal limits  She underwent a screening colonoscopy last year which was normal, follow-up in 5 years  She does follow up with her primary care physician on a regular basis  She also follows up with a dermatologist once a year  She had a DEXA scan in 2018 which had revealed osteopenia, through her primary care physician  Patient does weight-bearing exercises on a daily basis  She does take supplemental calcium  There is a strong family history of osteoporosis      The following portions of the patient's history were reviewed and updated as appropriate: allergies, current medications, past family history, past medical history, past social history, past surgical history and problem list     Review of Systems   Constitutional: Negative for fatigue, fever and unexpected weight change  Respiratory: Negative for cough, chest tightness, shortness of breath and wheezing  Cardiovascular: Negative  Negative for chest pain and palpitations  Gastrointestinal: Negative  Negative for abdominal distention, abdominal pain, blood in stool, constipation, diarrhea, nausea and vomiting  Genitourinary: Negative  Negative for difficulty urinating, dyspareunia, dysuria, flank pain, frequency, genital sores, hematuria, pelvic pain, urgency, vaginal bleeding, vaginal discharge and vaginal pain  Skin: Negative for rash  Objective:      /70   Ht 5' 4" (1 626 m)   Wt 55 9 kg (123 lb 3 2 oz)   Breastfeeding? No   BMI 21 15 kg/m²          Physical Exam   Constitutional: She appears well-developed and well-nourished  Cardiovascular: Normal rate and regular rhythm  Pulmonary/Chest: Effort normal and breath sounds normal  Right breast exhibits no inverted nipple, no mass, no nipple discharge, no skin change and no tenderness  Left breast exhibits no inverted nipple, no mass, no nipple discharge, no skin change and no tenderness  Abdominal: Soft  Bowel sounds are normal  She exhibits no distension  There is no tenderness  There is no rebound and no guarding  Genitourinary: Vagina normal and uterus normal  There is no lesion on the right labia  There is no lesion on the left labia  Cervix exhibits no discharge and no friability  Right adnexum displays no mass, no tenderness and no fullness  Left adnexum displays no mass, no tenderness and no fullness  No vaginal discharge found  Genitourinary Comments: The vagina is evident of slight estrogen deficiency  There is no evidence of pelvic floor prolapse  Rectovaginal exam is confirmatory

## 2019-04-15 ENCOUNTER — HOSPITAL ENCOUNTER (OUTPATIENT)
Dept: RADIOLOGY | Age: 64
Discharge: HOME | End: 2019-04-15
Payer: COMMERCIAL

## 2019-04-15 VITALS — HEIGHT: 64 IN | BODY MASS INDEX: 21 KG/M2 | WEIGHT: 123 LBS

## 2019-04-15 DIAGNOSIS — Z12.39 BREAST CANCER SCREENING: ICD-10-CM

## 2019-04-15 PROCEDURE — 77063 BREAST TOMOSYNTHESIS BI: CPT

## 2019-04-15 PROCEDURE — 77067 SCR MAMMO BI INCL CAD: CPT

## 2019-05-20 ENCOUNTER — OFFICE VISIT (OUTPATIENT)
Dept: PHYSICAL THERAPY | Facility: REHABILITATION | Age: 64
End: 2019-05-20
Payer: COMMERCIAL

## 2019-05-20 DIAGNOSIS — M25.561 ACUTE PAIN OF RIGHT KNEE: Primary | ICD-10-CM

## 2019-05-20 PROCEDURE — 97161 PT EVAL LOW COMPLEX 20 MIN: CPT | Performed by: PHYSICAL THERAPIST

## 2019-05-20 PROCEDURE — 97112 NEUROMUSCULAR REEDUCATION: CPT | Performed by: PHYSICAL THERAPIST

## 2019-05-22 ENCOUNTER — OFFICE VISIT (OUTPATIENT)
Dept: PHYSICAL THERAPY | Facility: REHABILITATION | Age: 64
End: 2019-05-22
Payer: COMMERCIAL

## 2019-05-22 DIAGNOSIS — M25.561 ACUTE PAIN OF RIGHT KNEE: Primary | ICD-10-CM

## 2019-05-22 PROCEDURE — 97140 MANUAL THERAPY 1/> REGIONS: CPT | Performed by: PHYSICAL THERAPIST

## 2019-05-22 PROCEDURE — 97112 NEUROMUSCULAR REEDUCATION: CPT | Performed by: PHYSICAL THERAPIST

## 2019-05-30 ENCOUNTER — OFFICE VISIT (OUTPATIENT)
Dept: PHYSICAL THERAPY | Facility: REHABILITATION | Age: 64
End: 2019-05-30
Payer: COMMERCIAL

## 2019-05-30 DIAGNOSIS — M25.561 ACUTE PAIN OF RIGHT KNEE: Primary | ICD-10-CM

## 2019-05-30 PROCEDURE — 97140 MANUAL THERAPY 1/> REGIONS: CPT | Performed by: PHYSICAL THERAPIST

## 2019-05-30 PROCEDURE — 97530 THERAPEUTIC ACTIVITIES: CPT | Performed by: PHYSICAL THERAPIST

## 2019-05-30 PROCEDURE — 97112 NEUROMUSCULAR REEDUCATION: CPT | Performed by: PHYSICAL THERAPIST

## 2019-06-05 ENCOUNTER — OFFICE VISIT (OUTPATIENT)
Dept: PHYSICAL THERAPY | Facility: REHABILITATION | Age: 64
End: 2019-06-05
Payer: COMMERCIAL

## 2019-06-05 DIAGNOSIS — M25.561 ACUTE PAIN OF RIGHT KNEE: Primary | ICD-10-CM

## 2019-06-05 PROCEDURE — 97140 MANUAL THERAPY 1/> REGIONS: CPT | Performed by: PHYSICAL THERAPIST

## 2019-06-05 PROCEDURE — 97112 NEUROMUSCULAR REEDUCATION: CPT | Performed by: PHYSICAL THERAPIST

## 2019-06-13 ENCOUNTER — OFFICE VISIT (OUTPATIENT)
Dept: PHYSICAL THERAPY | Facility: REHABILITATION | Age: 64
End: 2019-06-13
Payer: COMMERCIAL

## 2019-06-13 DIAGNOSIS — M25.561 ACUTE PAIN OF RIGHT KNEE: Primary | ICD-10-CM

## 2019-06-13 PROCEDURE — 97140 MANUAL THERAPY 1/> REGIONS: CPT | Performed by: PHYSICAL THERAPIST

## 2019-06-13 PROCEDURE — 97112 NEUROMUSCULAR REEDUCATION: CPT | Performed by: PHYSICAL THERAPIST

## 2020-01-09 ENCOUNTER — TELEPHONE (OUTPATIENT)
Dept: INTERNAL MEDICINE CLINIC | Facility: CLINIC | Age: 65
End: 2020-01-09

## 2020-01-09 DIAGNOSIS — R39.9 UTI SYMPTOMS: ICD-10-CM

## 2020-01-09 DIAGNOSIS — E78.5 HYPERLIPIDEMIA, UNSPECIFIED HYPERLIPIDEMIA TYPE: Primary | ICD-10-CM

## 2020-01-09 DIAGNOSIS — Z13.1 SCREENING FOR DIABETES MELLITUS: ICD-10-CM

## 2020-01-09 DIAGNOSIS — Z13.0 SCREENING FOR DEFICIENCY ANEMIA: ICD-10-CM

## 2020-01-09 DIAGNOSIS — Z12.11 SCREENING FOR COLON CANCER: ICD-10-CM

## 2020-01-09 NOTE — TELEPHONE ENCOUNTER
Please let the patient know that orders have been sent to the [facility] lab she should fast for 10-12 hours prior to going in for her blood work thank you

## 2020-02-06 ENCOUNTER — APPOINTMENT (OUTPATIENT)
Dept: LAB | Facility: CLINIC | Age: 65
End: 2020-02-06
Payer: COMMERCIAL

## 2020-02-06 ENCOUNTER — TRANSCRIBE ORDERS (OUTPATIENT)
Dept: LAB | Facility: CLINIC | Age: 65
End: 2020-02-06

## 2020-02-06 DIAGNOSIS — Z13.1 SCREENING FOR DIABETES MELLITUS: ICD-10-CM

## 2020-02-06 DIAGNOSIS — E78.5 HYPERLIPIDEMIA, UNSPECIFIED HYPERLIPIDEMIA TYPE: ICD-10-CM

## 2020-02-06 DIAGNOSIS — Z13.0 SCREENING FOR DEFICIENCY ANEMIA: ICD-10-CM

## 2020-02-06 LAB
ALBUMIN SERPL BCP-MCNC: 4 G/DL (ref 3.5–5)
ALP SERPL-CCNC: 61 U/L (ref 46–116)
ALT SERPL W P-5'-P-CCNC: 24 U/L (ref 12–78)
ANION GAP SERPL CALCULATED.3IONS-SCNC: 1 MMOL/L (ref 4–13)
AST SERPL W P-5'-P-CCNC: 22 U/L (ref 5–45)
BACTERIA UR QL AUTO: NORMAL /HPF
BASOPHILS # BLD AUTO: 0.05 THOUSANDS/ΜL (ref 0–0.1)
BASOPHILS NFR BLD AUTO: 1 % (ref 0–1)
BILIRUB SERPL-MCNC: 0.36 MG/DL (ref 0.2–1)
BILIRUB UR QL STRIP: NEGATIVE
BUN SERPL-MCNC: 17 MG/DL (ref 5–25)
CALCIUM SERPL-MCNC: 9.4 MG/DL (ref 8.3–10.1)
CHLORIDE SERPL-SCNC: 102 MMOL/L (ref 100–108)
CHOLEST SERPL-MCNC: 189 MG/DL (ref 50–200)
CLARITY UR: CLEAR
CO2 SERPL-SCNC: 32 MMOL/L (ref 21–32)
COLOR UR: YELLOW
CREAT SERPL-MCNC: 0.68 MG/DL (ref 0.6–1.3)
EOSINOPHIL # BLD AUTO: 0.09 THOUSAND/ΜL (ref 0–0.61)
EOSINOPHIL NFR BLD AUTO: 2 % (ref 0–6)
ERYTHROCYTE [DISTWIDTH] IN BLOOD BY AUTOMATED COUNT: 12.9 % (ref 11.6–15.1)
GFR SERPL CREATININE-BSD FRML MDRD: 94 ML/MIN/1.73SQ M
GLUCOSE P FAST SERPL-MCNC: 87 MG/DL (ref 65–99)
GLUCOSE UR STRIP-MCNC: NEGATIVE MG/DL
HCT VFR BLD AUTO: 41.9 % (ref 34.8–46.1)
HDLC SERPL-MCNC: 76 MG/DL
HGB BLD-MCNC: 13.6 G/DL (ref 11.5–15.4)
HGB UR QL STRIP.AUTO: ABNORMAL
HYALINE CASTS #/AREA URNS LPF: NORMAL /LPF
IMM GRANULOCYTES # BLD AUTO: 0.01 THOUSAND/UL (ref 0–0.2)
IMM GRANULOCYTES NFR BLD AUTO: 0 % (ref 0–2)
KETONES UR STRIP-MCNC: NEGATIVE MG/DL
LDLC SERPL CALC-MCNC: 105 MG/DL (ref 0–100)
LEUKOCYTE ESTERASE UR QL STRIP: ABNORMAL
LYMPHOCYTES # BLD AUTO: 1.46 THOUSANDS/ΜL (ref 0.6–4.47)
LYMPHOCYTES NFR BLD AUTO: 27 % (ref 14–44)
MCH RBC QN AUTO: 30 PG (ref 26.8–34.3)
MCHC RBC AUTO-ENTMCNC: 32.5 G/DL (ref 31.4–37.4)
MCV RBC AUTO: 93 FL (ref 82–98)
MONOCYTES # BLD AUTO: 0.54 THOUSAND/ΜL (ref 0.17–1.22)
MONOCYTES NFR BLD AUTO: 10 % (ref 4–12)
NEUTROPHILS # BLD AUTO: 3.17 THOUSANDS/ΜL (ref 1.85–7.62)
NEUTS SEG NFR BLD AUTO: 60 % (ref 43–75)
NITRITE UR QL STRIP: NEGATIVE
NON-SQ EPI CELLS URNS QL MICRO: NORMAL /HPF
NONHDLC SERPL-MCNC: 113 MG/DL
NRBC BLD AUTO-RTO: 0 /100 WBCS
PH UR STRIP.AUTO: 7 [PH]
PLATELET # BLD AUTO: 228 THOUSANDS/UL (ref 149–390)
PMV BLD AUTO: 9.7 FL (ref 8.9–12.7)
POTASSIUM SERPL-SCNC: 4.2 MMOL/L (ref 3.5–5.3)
PROT SERPL-MCNC: 7 G/DL (ref 6.4–8.2)
PROT UR STRIP-MCNC: NEGATIVE MG/DL
RBC # BLD AUTO: 4.53 MILLION/UL (ref 3.81–5.12)
RBC #/AREA URNS AUTO: NORMAL /HPF
SODIUM SERPL-SCNC: 135 MMOL/L (ref 136–145)
SP GR UR STRIP.AUTO: 1.01 (ref 1–1.03)
TRIGL SERPL-MCNC: 41 MG/DL
UROBILINOGEN UR QL STRIP.AUTO: 0.2 E.U./DL
WBC # BLD AUTO: 5.32 THOUSAND/UL (ref 4.31–10.16)
WBC #/AREA URNS AUTO: NORMAL /HPF

## 2020-02-06 PROCEDURE — 85025 COMPLETE CBC W/AUTO DIFF WBC: CPT

## 2020-02-06 PROCEDURE — G0328 FECAL BLOOD SCRN IMMUNOASSAY: HCPCS

## 2020-02-06 PROCEDURE — 81001 URINALYSIS AUTO W/SCOPE: CPT | Performed by: INTERNAL MEDICINE

## 2020-02-06 PROCEDURE — 36415 COLL VENOUS BLD VENIPUNCTURE: CPT

## 2020-02-06 PROCEDURE — 80053 COMPREHEN METABOLIC PANEL: CPT

## 2020-02-06 PROCEDURE — 80061 LIPID PANEL: CPT

## 2020-02-13 ENCOUNTER — OFFICE VISIT (OUTPATIENT)
Dept: INTERNAL MEDICINE CLINIC | Facility: CLINIC | Age: 65
End: 2020-02-13
Payer: COMMERCIAL

## 2020-02-13 VITALS
BODY MASS INDEX: 21.51 KG/M2 | OXYGEN SATURATION: 98 % | RESPIRATION RATE: 16 BRPM | HEART RATE: 67 BPM | WEIGHT: 126 LBS | TEMPERATURE: 97.8 F | SYSTOLIC BLOOD PRESSURE: 104 MMHG | DIASTOLIC BLOOD PRESSURE: 62 MMHG | HEIGHT: 64 IN

## 2020-02-13 DIAGNOSIS — Z01.84 IMMUNITY STATUS TESTING: ICD-10-CM

## 2020-02-13 DIAGNOSIS — E87.1 HYPONATREMIA: ICD-10-CM

## 2020-02-13 DIAGNOSIS — E78.5 HYPERLIPIDEMIA, UNSPECIFIED HYPERLIPIDEMIA TYPE: Primary | ICD-10-CM

## 2020-02-13 DIAGNOSIS — M85.89 OSTEOPENIA OF MULTIPLE SITES: ICD-10-CM

## 2020-02-13 PROBLEM — M81.0 AGE RELATED OSTEOPOROSIS: Status: RESOLVED | Noted: 2017-11-16 | Resolved: 2020-02-13

## 2020-02-13 PROCEDURE — 99396 PREV VISIT EST AGE 40-64: CPT | Performed by: INTERNAL MEDICINE

## 2020-02-13 NOTE — ASSESSMENT & PLAN NOTE
The patient is not sure she had measles mumps and rubella during childhood and is requesting testing will schedule her for a MMR immunity screening study

## 2020-02-13 NOTE — ASSESSMENT & PLAN NOTE
A review of blood work indicates very mild hyponatremia recommend slight increase in salt consumption in the diet  The patient has no symptoms secondary to this mild hyponatremia  A follow-up assessment in 1 year is recommended

## 2020-02-13 NOTE — ASSESSMENT & PLAN NOTE
Very mild hyper lipidemia is noted I recommend that the patient continue on a low-cholesterol diet and also continue with Omega 3 fatty acid supplementation 2 g daily  A follow-up assessment of lipids is recommended in 1 year

## 2020-02-13 NOTE — PROGRESS NOTES
Assessment/Plan:    Osteopenia of multiple sites  Of the patient's most recent DEXA scan shows osteopenia at multiple sites  Recommend that she continue with weight-bearing exercise and also continue with calcium supplementation 1000 mg of calcium carbonate daily along with 2000 units of vitamin-D  Hyponatremia  A review of blood work indicates very mild hyponatremia recommend slight increase in salt consumption in the diet  The patient has no symptoms secondary to this mild hyponatremia  A follow-up assessment in 1 year is recommended  Hyperlipidemia  Very mild hyper lipidemia is noted I recommend that the patient continue on a low-cholesterol diet and also continue with Omega 3 fatty acid supplementation 2 g daily  A follow-up assessment of lipids is recommended in 1 year  Immunity status testing  The patient is not sure she had measles mumps and rubella during childhood and is requesting testing will schedule her for a MMR immunity screening study  Diagnoses and all orders for this visit:    Immunity status testing  -     Measles/Mumps/Rubella Immunity; Future    Hyperlipidemia, unspecified hyperlipidemia type        Subjective:      Patient ID: Juvenal Rose is a 60 y o  female  This 72-year-old female patient presents today for an annual complete physical examination and review of blood work  She has been in good health over the past year reporting only occasional sleep issues of  She indicates that she falls asleep fine but sometimes wakes up and has a difficult time falling back to sleep  She occasionally uses Tylenol p m  Patsi Reil She finds that melatonin causes too sedation the following day  She has no complaints of any chest pains palpitations or shortness of breath  She has regular follow-up visits with her gynecologist and is up-to-date on her mammogram screening for breast cancer  She is current on her present colonoscopy screening for colon cancer        The following portions of the patient's history were reviewed and updated as appropriate:   She  has a past medical history of Allergic contact dermatitis, Allergic to insect bites and stings, Conjunctivitis, Herpes simplex infection, Laryngitis, Pharyngitis, and Pneumonia  She   Patient Active Problem List    Diagnosis Date Noted    Hyponatremia 2020    Osteopenia of multiple sites 2020    Immunity status testing 2020    Hyperlipidemia 2019     She  has a past surgical history that includes  section and Mouth surgery  Her family history includes Atrial fibrillation in her family; Cancer in her family; Glaucoma in her mother; Heart disease in her family and mother; Hypertension in her family and mother; Parkinsonism in her family  She  reports that she has never smoked  She has never used smokeless tobacco  She reports that she drinks alcohol  She reports that she does not use drugs  Current Outpatient Medications   Medication Sig Dispense Refill    b complex vitamins tablet Take 1 tablet by mouth daily      Cholecalciferol 1000 units capsule Take 2,000 Units by mouth daily      Magnesium 100 MG CAPS Take by mouth      multivitamin (THERAGRAN) TABS Take 1 tablet by mouth daily      Omega-3 Fatty Acids (FISH OIL PO) Take 2 g by mouth      VITAMIN C, CALCIUM ASCORBATE, PO Take by mouth       No current facility-administered medications for this visit       Review of Systems   Psychiatric/Behavioral: Positive for sleep disturbance  All other systems reviewed and are negative  Objective:      /62   Pulse 67   Temp 97 8 °F (36 6 °C)   Resp 16   Ht 5' 4" (1 626 m)   Wt 57 2 kg (126 lb)   SpO2 98%   BMI 21 63 kg/m²          Physical Exam   Constitutional: She is oriented to person, place, and time  Vital signs are normal  She appears well-developed and well-nourished  She is cooperative  No distress     HENT:   Right Ear: Hearing, tympanic membrane, external ear and ear canal normal    Left Ear: Hearing, tympanic membrane, external ear and ear canal normal    Nose: Nose normal  No mucosal edema  Mouth/Throat: Uvula is midline, oropharynx is clear and moist and mucous membranes are normal  No oropharyngeal exudate  Eyes: Pupils are equal, round, and reactive to light  Conjunctivae and lids are normal  Right eye exhibits no discharge  Left eye exhibits no discharge  Neck: No JVD present  Carotid bruit is not present  No thyromegaly present  Cardiovascular: Normal rate, regular rhythm, normal heart sounds and intact distal pulses  No murmur heard  Pulmonary/Chest: Effort normal and breath sounds normal  No stridor  No respiratory distress  She has no wheezes  She has no rales  Abdominal: Soft  Normal appearance and bowel sounds are normal  She exhibits no distension and no mass  There is no tenderness  There is no rebound and no guarding  Musculoskeletal: Normal range of motion  She exhibits no edema, tenderness or deformity  Lymphadenopathy:     She has no cervical adenopathy  Neurological: She is alert and oriented to person, place, and time  She has normal reflexes  She displays normal reflexes  No cranial nerve deficit or sensory deficit  She exhibits normal muscle tone  Coordination normal    Skin: Skin is warm, dry and intact  No rash noted  She is not diaphoretic  No erythema  No pallor  Psychiatric: She has a normal mood and affect  Her speech is normal and behavior is normal  Judgment and thought content normal  Cognition and memory are normal    Vitals reviewed

## 2020-02-13 NOTE — ASSESSMENT & PLAN NOTE
Of the patient's most recent DEXA scan shows osteopenia at multiple sites    Recommend that she continue with weight-bearing exercise and also continue with calcium supplementation 1000 mg of calcium carbonate daily along with 2000 units of vitamin-D

## 2020-02-25 ENCOUNTER — APPOINTMENT (OUTPATIENT)
Dept: LAB | Facility: CLINIC | Age: 65
End: 2020-02-25
Payer: COMMERCIAL

## 2020-02-25 DIAGNOSIS — Z01.84 IMMUNITY STATUS TESTING: ICD-10-CM

## 2020-02-25 LAB — RUBV IGG SERPL IA-ACNC: >175 IU/ML

## 2020-02-25 PROCEDURE — 86735 MUMPS ANTIBODY: CPT

## 2020-02-25 PROCEDURE — 36415 COLL VENOUS BLD VENIPUNCTURE: CPT

## 2020-02-25 PROCEDURE — 86762 RUBELLA ANTIBODY: CPT

## 2020-02-25 PROCEDURE — 86765 RUBEOLA ANTIBODY: CPT

## 2020-02-27 LAB
MEV IGG SER QL: NORMAL
MUV IGG SER QL: NORMAL

## 2020-06-25 ENCOUNTER — HOSPITAL ENCOUNTER (OUTPATIENT)
Dept: RADIOLOGY | Age: 65
Discharge: HOME | End: 2020-06-25
Payer: COMMERCIAL

## 2020-06-25 VITALS — BODY MASS INDEX: 21.51 KG/M2 | WEIGHT: 126 LBS | HEIGHT: 64 IN

## 2020-06-25 DIAGNOSIS — Z12.31 ENCOUNTER FOR SCREENING MAMMOGRAM FOR MALIGNANT NEOPLASM OF BREAST: ICD-10-CM

## 2020-06-25 PROCEDURE — 77063 BREAST TOMOSYNTHESIS BI: CPT

## 2020-06-25 PROCEDURE — 77067 SCR MAMMO BI INCL CAD: CPT

## 2020-08-19 ENCOUNTER — ANNUAL EXAM (OUTPATIENT)
Dept: OBGYN CLINIC | Facility: CLINIC | Age: 65
End: 2020-08-19
Payer: COMMERCIAL

## 2020-08-19 VITALS
HEIGHT: 64 IN | BODY MASS INDEX: 22.02 KG/M2 | WEIGHT: 129 LBS | SYSTOLIC BLOOD PRESSURE: 112 MMHG | DIASTOLIC BLOOD PRESSURE: 70 MMHG | TEMPERATURE: 98 F

## 2020-08-19 DIAGNOSIS — M85.80 OSTEOPENIA, UNSPECIFIED LOCATION: ICD-10-CM

## 2020-08-19 DIAGNOSIS — Z01.419 ENCOUNTER FOR ANNUAL ROUTINE GYNECOLOGICAL EXAMINATION: Primary | ICD-10-CM

## 2020-08-19 DIAGNOSIS — Z12.39 BREAST CANCER SCREENING: ICD-10-CM

## 2020-08-19 PROCEDURE — 99396 PREV VISIT EST AGE 40-64: CPT | Performed by: OBSTETRICS & GYNECOLOGY

## 2020-08-19 PROCEDURE — 3008F BODY MASS INDEX DOCD: CPT | Performed by: OBSTETRICS & GYNECOLOGY

## 2020-08-19 PROCEDURE — 3725F SCREEN DEPRESSION PERFORMED: CPT | Performed by: OBSTETRICS & GYNECOLOGY

## 2020-08-19 PROCEDURE — 1036F TOBACCO NON-USER: CPT | Performed by: OBSTETRICS & GYNECOLOGY

## 2020-08-19 RX ORDER — PROPRANOLOL/HYDROCHLOROTHIAZID 40 MG-25MG
TABLET ORAL
COMMUNITY
Start: 2019-12-01 | End: 2021-06-25

## 2020-08-19 NOTE — PROGRESS NOTES
Assessment/Plan:  Pap smear deferred due to low risk status  Encouraged self-breast examination as well as calcium supplementation  Continue annual mammogram    Recommend DEXA scan follow-up osteopenia Reviewed colon cancer screening done 2018, follow-up 5 years  She will continue to follow-up with her family physician as scheduled  Return to office in 1 year or p r n  No problem-specific Assessment & Plan notes found for this encounter  Diagnoses and all orders for this visit:    Encounter for annual routine gynecological examination    Breast cancer screening  -     Mammo screening bilateral w 3d & cad; Future    Osteopenia, unspecified location  -     DXA bone density spine hip and pelvis; Future    Other orders  -     Turmeric (Curcumin 95) 500 MG CAPS          Subjective:      Patient ID: Varun Rush is a 60 y o  female  HPI     This is a very pleasant 72-year-old female  ( x1,  x1) presents for annual gyn exam   Patient offers no complaints today  She went through menopause at age 54  She has never been on hormone replacement therapy  She denies any vaginal bleeding or spotting  There has been no changes in bowel or bladder function  Patient has been in a monogamous relationship with her  for over 40 years  Her Pap smears have been normal   Last Pap smear 2018  She underwent screening colonoscopy in 2018 within normal limits with follow-up in 5 years  She continues to follow-up with her family physician and dermatologist on an annual basis  Patient underwent DEXA scan in 2018 revealing osteopenia  She does exercise on a daily basis and takes calcium      The following portions of the patient's history were reviewed and updated as appropriate: allergies, current medications, past family history, past medical history, past social history, past surgical history and problem list     Review of Systems   Constitutional: Negative for fatigue, fever and unexpected weight change  Respiratory: Negative for cough, chest tightness, shortness of breath and wheezing  Cardiovascular: Negative  Negative for chest pain and palpitations  Gastrointestinal: Negative  Negative for abdominal distention, abdominal pain, blood in stool, constipation, diarrhea, nausea and vomiting  Genitourinary: Negative  Negative for difficulty urinating, dyspareunia, dysuria, flank pain, frequency, genital sores, hematuria, pelvic pain, urgency, vaginal bleeding, vaginal discharge and vaginal pain  Skin: Negative for rash  Objective:      /70   Temp 98 °F (36 7 °C)   Ht 5' 4" (1 626 m)   Wt 58 5 kg (129 lb)   BMI 22 14 kg/m²          Physical Exam  Constitutional:       Appearance: She is well-developed  Cardiovascular:      Rate and Rhythm: Normal rate and regular rhythm  Pulmonary:      Effort: Pulmonary effort is normal       Breath sounds: Normal breath sounds  Chest:      Breasts:         Right: No inverted nipple, mass, nipple discharge, skin change or tenderness  Left: No inverted nipple, mass, nipple discharge, skin change or tenderness  Abdominal:      General: Bowel sounds are normal  There is no distension  Palpations: Abdomen is soft  Tenderness: There is no abdominal tenderness  There is no guarding or rebound  Genitourinary:     Labia:         Right: No lesion  Left: No lesion  Vagina: Normal  No vaginal discharge  Cervix: No discharge or friability  Adnexa:         Right: No mass, tenderness or fullness  Left: No mass, tenderness or fullness  Comments: External genitalia is within normal limits  The vagina is evident of estrogen deficiency  There is no evidence of pelvic floor prolapse  Rectovaginal exam is confirmatory

## 2020-09-21 ENCOUNTER — HOSPITAL ENCOUNTER (OUTPATIENT)
Dept: RADIOLOGY | Age: 65
Discharge: HOME | End: 2020-09-21
Payer: COMMERCIAL

## 2020-09-21 DIAGNOSIS — M85.80 OSTEOPENIA, UNSPECIFIED LOCATION: ICD-10-CM

## 2020-09-21 PROCEDURE — 77080 DXA BONE DENSITY AXIAL: CPT

## 2020-09-23 ENCOUNTER — TELEPHONE (OUTPATIENT)
Dept: OBGYN CLINIC | Facility: CLINIC | Age: 65
End: 2020-09-23

## 2020-09-23 NOTE — TELEPHONE ENCOUNTER
----- Message from Rock Alfaro DO sent at 9/23/2020  1:35 PM EDT -----  Inform pt dexa reveals stable osteopenia, cont ca+ and vit d, repeat dexa 2 yrs

## 2020-10-01 NOTE — TELEPHONE ENCOUNTER
Tried to call but says number not working now  Left message on Caren garcia () to have her give us a call

## 2020-11-04 ENCOUNTER — EVALUATION (OUTPATIENT)
Dept: PHYSICAL THERAPY | Facility: REHABILITATION | Age: 65
End: 2020-11-04
Payer: COMMERCIAL

## 2020-11-05 ENCOUNTER — OFFICE VISIT (OUTPATIENT)
Dept: PHYSICAL THERAPY | Facility: REHABILITATION | Age: 65
End: 2020-11-05
Payer: COMMERCIAL

## 2020-11-05 DIAGNOSIS — M25.552 LEFT HIP PAIN: Primary | ICD-10-CM

## 2020-11-05 PROCEDURE — 97161 PT EVAL LOW COMPLEX 20 MIN: CPT | Performed by: PHYSICAL THERAPIST

## 2020-11-18 ENCOUNTER — OFFICE VISIT (OUTPATIENT)
Dept: PHYSICAL THERAPY | Facility: REHABILITATION | Age: 65
End: 2020-11-18
Payer: COMMERCIAL

## 2020-11-18 DIAGNOSIS — M25.552 LEFT HIP PAIN: Primary | ICD-10-CM

## 2020-11-18 PROCEDURE — 97112 NEUROMUSCULAR REEDUCATION: CPT | Performed by: PHYSICAL THERAPIST

## 2020-11-23 ENCOUNTER — OFFICE VISIT (OUTPATIENT)
Dept: PHYSICAL THERAPY | Facility: REHABILITATION | Age: 65
End: 2020-11-23
Payer: COMMERCIAL

## 2020-11-23 DIAGNOSIS — M25.552 LEFT HIP PAIN: Primary | ICD-10-CM

## 2020-11-23 PROCEDURE — 97140 MANUAL THERAPY 1/> REGIONS: CPT | Performed by: PHYSICAL THERAPIST

## 2020-11-23 PROCEDURE — 97112 NEUROMUSCULAR REEDUCATION: CPT | Performed by: PHYSICAL THERAPIST

## 2020-11-25 ENCOUNTER — OFFICE VISIT (OUTPATIENT)
Dept: PHYSICAL THERAPY | Facility: REHABILITATION | Age: 65
End: 2020-11-25
Payer: COMMERCIAL

## 2020-11-25 DIAGNOSIS — M25.552 LEFT HIP PAIN: Primary | ICD-10-CM

## 2020-11-25 PROCEDURE — 97140 MANUAL THERAPY 1/> REGIONS: CPT | Performed by: PHYSICAL THERAPIST

## 2020-11-25 PROCEDURE — 97112 NEUROMUSCULAR REEDUCATION: CPT | Performed by: PHYSICAL THERAPIST

## 2020-12-03 ENCOUNTER — TELEPHONE (OUTPATIENT)
Dept: INTERNAL MEDICINE CLINIC | Facility: CLINIC | Age: 65
End: 2020-12-03

## 2020-12-04 DIAGNOSIS — M54.9 BACK PAIN, UNSPECIFIED BACK LOCATION, UNSPECIFIED BACK PAIN LATERALITY, UNSPECIFIED CHRONICITY: Primary | ICD-10-CM

## 2020-12-17 ENCOUNTER — APPOINTMENT (OUTPATIENT)
Dept: PHYSICAL THERAPY | Facility: REHABILITATION | Age: 65
End: 2020-12-17
Payer: COMMERCIAL

## 2020-12-18 ENCOUNTER — OFFICE VISIT (OUTPATIENT)
Dept: PHYSICAL THERAPY | Facility: REHABILITATION | Age: 65
End: 2020-12-18
Payer: COMMERCIAL

## 2020-12-18 DIAGNOSIS — M25.552 LEFT HIP PAIN: Primary | ICD-10-CM

## 2020-12-18 PROCEDURE — 97140 MANUAL THERAPY 1/> REGIONS: CPT | Performed by: PHYSICAL THERAPIST

## 2020-12-29 ENCOUNTER — OFFICE VISIT (OUTPATIENT)
Dept: PHYSICAL THERAPY | Facility: REHABILITATION | Age: 65
End: 2020-12-29
Payer: COMMERCIAL

## 2020-12-29 DIAGNOSIS — M25.552 LEFT HIP PAIN: Primary | ICD-10-CM

## 2020-12-29 DIAGNOSIS — M26.609 TEMPORAL MANDIBULAR JOINT DISORDER: ICD-10-CM

## 2020-12-29 PROCEDURE — 97161 PT EVAL LOW COMPLEX 20 MIN: CPT | Performed by: PHYSICAL THERAPIST

## 2021-01-23 ENCOUNTER — IMMUNIZATIONS (OUTPATIENT)
Dept: FAMILY MEDICINE CLINIC | Facility: HOSPITAL | Age: 66
End: 2021-01-23

## 2021-01-23 DIAGNOSIS — Z23 ENCOUNTER FOR IMMUNIZATION: Primary | ICD-10-CM

## 2021-01-23 PROCEDURE — 91301 SARS-COV-2 / COVID-19 MRNA VACCINE (MODERNA) 100 MCG: CPT

## 2021-01-23 PROCEDURE — 0011A SARS-COV-2 / COVID-19 MRNA VACCINE (MODERNA) 100 MCG: CPT

## 2021-02-16 ENCOUNTER — TELEPHONE (OUTPATIENT)
Dept: INTERNAL MEDICINE CLINIC | Facility: CLINIC | Age: 66
End: 2021-02-16

## 2021-02-16 DIAGNOSIS — Z13.0 SCREENING FOR DEFICIENCY ANEMIA: ICD-10-CM

## 2021-02-16 DIAGNOSIS — Z13.1 SCREENING FOR DIABETES MELLITUS: ICD-10-CM

## 2021-02-16 DIAGNOSIS — E78.5 HYPERLIPIDEMIA, UNSPECIFIED HYPERLIPIDEMIA TYPE: Primary | ICD-10-CM

## 2021-02-16 DIAGNOSIS — Z13.29 SCREENING FOR HYPOTHYROIDISM: ICD-10-CM

## 2021-02-16 DIAGNOSIS — R39.9 UTI SYMPTOMS: ICD-10-CM

## 2021-02-16 DIAGNOSIS — Z12.11 SCREENING FOR COLON CANCER: ICD-10-CM

## 2021-02-16 NOTE — TELEPHONE ENCOUNTER
Pt would like labs placed for her physical,she will fast for 12 hours and she will go to [facility]

## 2021-02-20 ENCOUNTER — IMMUNIZATIONS (OUTPATIENT)
Dept: FAMILY MEDICINE CLINIC | Facility: HOSPITAL | Age: 66
End: 2021-02-20

## 2021-02-20 DIAGNOSIS — Z23 ENCOUNTER FOR IMMUNIZATION: Primary | ICD-10-CM

## 2021-02-20 PROCEDURE — 0012A SARS-COV-2 / COVID-19 MRNA VACCINE (MODERNA) 100 MCG: CPT

## 2021-02-20 PROCEDURE — 91301 SARS-COV-2 / COVID-19 MRNA VACCINE (MODERNA) 100 MCG: CPT

## 2021-03-11 ENCOUNTER — APPOINTMENT (OUTPATIENT)
Dept: LAB | Facility: CLINIC | Age: 66
End: 2021-03-11
Payer: COMMERCIAL

## 2021-03-11 DIAGNOSIS — Z13.1 SCREENING FOR DIABETES MELLITUS: ICD-10-CM

## 2021-03-11 DIAGNOSIS — Z13.0 SCREENING FOR DEFICIENCY ANEMIA: ICD-10-CM

## 2021-03-11 DIAGNOSIS — E78.5 HYPERLIPIDEMIA, UNSPECIFIED HYPERLIPIDEMIA TYPE: ICD-10-CM

## 2021-03-11 DIAGNOSIS — Z13.29 SCREENING FOR HYPOTHYROIDISM: ICD-10-CM

## 2021-03-11 LAB
ALBUMIN SERPL BCP-MCNC: 4.2 G/DL (ref 3.5–5)
ALP SERPL-CCNC: 71 U/L (ref 46–116)
ALT SERPL W P-5'-P-CCNC: 29 U/L (ref 12–78)
ANION GAP SERPL CALCULATED.3IONS-SCNC: 6 MMOL/L (ref 4–13)
AST SERPL W P-5'-P-CCNC: 25 U/L (ref 5–45)
BACTERIA UR QL AUTO: NORMAL /HPF
BASOPHILS # BLD AUTO: 0.09 THOUSANDS/ΜL (ref 0–0.1)
BASOPHILS NFR BLD AUTO: 2 % (ref 0–1)
BILIRUB SERPL-MCNC: 0.72 MG/DL (ref 0.2–1)
BILIRUB UR QL STRIP: NEGATIVE
BUN SERPL-MCNC: 15 MG/DL (ref 5–25)
CALCIUM SERPL-MCNC: 9.8 MG/DL (ref 8.3–10.1)
CHLORIDE SERPL-SCNC: 106 MMOL/L (ref 100–108)
CHOLEST SERPL-MCNC: 229 MG/DL (ref 50–200)
CLARITY UR: CLEAR
CO2 SERPL-SCNC: 28 MMOL/L (ref 21–32)
COLOR UR: YELLOW
CREAT SERPL-MCNC: 0.7 MG/DL (ref 0.6–1.3)
EOSINOPHIL # BLD AUTO: 0.11 THOUSAND/ΜL (ref 0–0.61)
EOSINOPHIL NFR BLD AUTO: 2 % (ref 0–6)
ERYTHROCYTE [DISTWIDTH] IN BLOOD BY AUTOMATED COUNT: 12.8 % (ref 11.6–15.1)
GFR SERPL CREATININE-BSD FRML MDRD: 93 ML/MIN/1.73SQ M
GLUCOSE P FAST SERPL-MCNC: 90 MG/DL (ref 65–99)
GLUCOSE UR STRIP-MCNC: NEGATIVE MG/DL
HCT VFR BLD AUTO: 43.3 % (ref 34.8–46.1)
HDLC SERPL-MCNC: 79 MG/DL
HGB BLD-MCNC: 14.1 G/DL (ref 11.5–15.4)
HGB UR QL STRIP.AUTO: NEGATIVE
HYALINE CASTS #/AREA URNS LPF: NORMAL /LPF
IMM GRANULOCYTES # BLD AUTO: 0.01 THOUSAND/UL (ref 0–0.2)
IMM GRANULOCYTES NFR BLD AUTO: 0 % (ref 0–2)
KETONES UR STRIP-MCNC: NEGATIVE MG/DL
LDLC SERPL CALC-MCNC: 141 MG/DL (ref 0–100)
LEUKOCYTE ESTERASE UR QL STRIP: ABNORMAL
LYMPHOCYTES # BLD AUTO: 1.6 THOUSANDS/ΜL (ref 0.6–4.47)
LYMPHOCYTES NFR BLD AUTO: 29 % (ref 14–44)
MCH RBC QN AUTO: 30.7 PG (ref 26.8–34.3)
MCHC RBC AUTO-ENTMCNC: 32.6 G/DL (ref 31.4–37.4)
MCV RBC AUTO: 94 FL (ref 82–98)
MONOCYTES # BLD AUTO: 0.55 THOUSAND/ΜL (ref 0.17–1.22)
MONOCYTES NFR BLD AUTO: 10 % (ref 4–12)
NEUTROPHILS # BLD AUTO: 3.23 THOUSANDS/ΜL (ref 1.85–7.62)
NEUTS SEG NFR BLD AUTO: 57 % (ref 43–75)
NITRITE UR QL STRIP: NEGATIVE
NON-SQ EPI CELLS URNS QL MICRO: NORMAL /HPF
NONHDLC SERPL-MCNC: 150 MG/DL
NRBC BLD AUTO-RTO: 0 /100 WBCS
PH UR STRIP.AUTO: 7 [PH]
PLATELET # BLD AUTO: 310 THOUSANDS/UL (ref 149–390)
PMV BLD AUTO: 9.7 FL (ref 8.9–12.7)
POTASSIUM SERPL-SCNC: 4.2 MMOL/L (ref 3.5–5.3)
PROT SERPL-MCNC: 7.4 G/DL (ref 6.4–8.2)
PROT UR STRIP-MCNC: NEGATIVE MG/DL
RBC # BLD AUTO: 4.59 MILLION/UL (ref 3.81–5.12)
RBC #/AREA URNS AUTO: NORMAL /HPF
SODIUM SERPL-SCNC: 140 MMOL/L (ref 136–145)
SP GR UR STRIP.AUTO: 1.01 (ref 1–1.03)
TRIGL SERPL-MCNC: 44 MG/DL
TSH SERPL DL<=0.05 MIU/L-ACNC: 0.96 UIU/ML (ref 0.36–3.74)
UROBILINOGEN UR QL STRIP.AUTO: 0.2 E.U./DL
WBC # BLD AUTO: 5.59 THOUSAND/UL (ref 4.31–10.16)
WBC #/AREA URNS AUTO: NORMAL /HPF

## 2021-03-11 PROCEDURE — 80061 LIPID PANEL: CPT

## 2021-03-11 PROCEDURE — 85025 COMPLETE CBC W/AUTO DIFF WBC: CPT

## 2021-03-11 PROCEDURE — 81001 URINALYSIS AUTO W/SCOPE: CPT | Performed by: INTERNAL MEDICINE

## 2021-03-11 PROCEDURE — 84443 ASSAY THYROID STIM HORMONE: CPT

## 2021-03-11 PROCEDURE — 80053 COMPREHEN METABOLIC PANEL: CPT

## 2021-03-11 PROCEDURE — 36415 COLL VENOUS BLD VENIPUNCTURE: CPT

## 2021-03-22 ENCOUNTER — APPOINTMENT (OUTPATIENT)
Dept: LAB | Facility: CLINIC | Age: 66
End: 2021-03-22
Payer: COMMERCIAL

## 2021-03-22 DIAGNOSIS — Z12.11 SCREENING FOR COLON CANCER: ICD-10-CM

## 2021-03-22 LAB — HEMOCCULT STL QL IA: NEGATIVE

## 2021-03-22 PROCEDURE — G0328 FECAL BLOOD SCRN IMMUNOASSAY: HCPCS

## 2021-03-30 ENCOUNTER — OFFICE VISIT (OUTPATIENT)
Dept: INTERNAL MEDICINE CLINIC | Facility: CLINIC | Age: 66
End: 2021-03-30
Payer: COMMERCIAL

## 2021-03-30 VITALS
TEMPERATURE: 97.8 F | DIASTOLIC BLOOD PRESSURE: 64 MMHG | SYSTOLIC BLOOD PRESSURE: 122 MMHG | OXYGEN SATURATION: 98 % | BODY MASS INDEX: 21.58 KG/M2 | WEIGHT: 126.4 LBS | HEIGHT: 64 IN | HEART RATE: 69 BPM

## 2021-03-30 DIAGNOSIS — Z23 ENCOUNTER FOR IMMUNIZATION: ICD-10-CM

## 2021-03-30 DIAGNOSIS — Z00.00 HEALTHCARE MAINTENANCE: Primary | ICD-10-CM

## 2021-03-30 DIAGNOSIS — E78.5 HYPERLIPIDEMIA, UNSPECIFIED HYPERLIPIDEMIA TYPE: ICD-10-CM

## 2021-03-30 PROBLEM — Z01.84 IMMUNITY STATUS TESTING: Status: RESOLVED | Noted: 2020-02-13 | Resolved: 2021-03-30

## 2021-03-30 PROBLEM — E87.1 HYPONATREMIA: Status: RESOLVED | Noted: 2020-02-13 | Resolved: 2021-03-30

## 2021-03-30 PROCEDURE — 1036F TOBACCO NON-USER: CPT | Performed by: INTERNAL MEDICINE

## 2021-03-30 PROCEDURE — 99396 PREV VISIT EST AGE 40-64: CPT | Performed by: INTERNAL MEDICINE

## 2021-03-30 PROCEDURE — 3008F BODY MASS INDEX DOCD: CPT | Performed by: INTERNAL MEDICINE

## 2021-03-30 PROCEDURE — 90750 HZV VACC RECOMBINANT IM: CPT

## 2021-03-30 PROCEDURE — 90471 IMMUNIZATION ADMIN: CPT

## 2021-03-30 RX ORDER — CHLORHEXIDINE GLUCONATE 0.12 MG/ML
RINSE ORAL
COMMUNITY
Start: 2021-02-16 | End: 2021-06-25

## 2021-03-30 NOTE — ASSESSMENT & PLAN NOTE
Patient presents today for an annual physical examination without any complaints or issues  She has regular follow-up visits with her gynecologist and recently underwent a DEXA scan which does show signs of early osteopenia  Regular exercise of weight-bearing type along with calcium and vitamin-D are recommended  Patient is current on colonoscopy examination is due for her next examination in 2023

## 2021-03-30 NOTE — PROGRESS NOTES
Assessment/Plan:    Hyperlipidemia    Assessment of the patient's lipid profile with her today indicates an elevating LDL value  We reviewed this with the patient in detail today providing with patient education material to try to modify her diet in an effort to reduce the LDL values  I have requested a follow-up test in 6 months at which time will review the results with the patient during an office visit  If values remain elevated in the range that they are currently consideration for introduction of a low-dose of statin medication at that time  Healthcare maintenance    Patient presents today for an annual physical examination without any complaints or issues  She has regular follow-up visits with her gynecologist and recently underwent a DEXA scan which does show signs of early osteopenia  Regular exercise of weight-bearing type along with calcium and vitamin-D are recommended  Patient is current on colonoscopy examination is due for her next examination in 2023  Diagnoses and all orders for this visit:    Encounter for immunization  -     Zoster Vaccine Recombinant IM    Hyperlipidemia, unspecified hyperlipidemia type  -     Lipid panel; Future    Healthcare maintenance    Other orders  -     chlorhexidine (PERIDEX) 0 12 % solution; RINSE WITH 10 ML 2 TIMES A DAY,BEFORE BREAKFAST AND AT BEDTIME, F   (REFER TO PRESCRIPTION NOTES)  Subjective:      Patient ID: Jaelyn Stern is a 63 y o  female  This is an annual complete physical examination for this 61-year-old female patient  During today's visit we have also reviewed her comprehensive blood work  She presents today with no complaints  She has had no signs or symptoms of COVID-19 infection  She has received both of her COVID-19 vaccinations  Patient will be receiving her 1st Shingrix vaccine this afternoon of for  Prophylaxis against shingles      She denies any cardiac symptoms of chest pains palpitations or shortness of breath has no peripheral edema symptoms as well  The following portions of the patient's history were reviewed and updated as appropriate:   She  has a past medical history of Allergic contact dermatitis, Allergic to insect bites and stings, Conjunctivitis, Herpes simplex infection, Laryngitis, Pharyngitis, and Pneumonia  She   Patient Active Problem List    Diagnosis Date Noted    Healthcare maintenance 2021    Osteopenia of multiple sites 2020    Hyperlipidemia 2019     She  has a past surgical history that includes  section and Mouth surgery  Her family history includes Atrial fibrillation in her family; Cancer in her family and paternal grandmother; Glaucoma in her mother; Heart disease in her family, maternal grandmother, and mother; Hypertension in her family and mother; Lung cancer in her maternal aunt; Melanoma in her brother; No Known Problems in her brother, daughter, maternal grandfather, paternal aunt, paternal aunt, paternal aunt, paternal aunt, paternal aunt, paternal grandfather, sister, and son; Parkinsonism in her family and father  She  reports that she has never smoked  She has never used smokeless tobacco  She reports current alcohol use  She reports that she does not use drugs  Current Outpatient Medications   Medication Sig Dispense Refill    b complex vitamins tablet Take 1 tablet by mouth daily      chlorhexidine (PERIDEX) 0 12 % solution RINSE WITH 10 ML 2 TIMES A DAY,BEFORE BREAKFAST AND AT BEDTIME, F   (REFER TO PRESCRIPTION NOTES)   Cholecalciferol 1000 units capsule Take 2,000 Units by mouth daily      Magnesium 100 MG CAPS Take by mouth      multivitamin (THERAGRAN) TABS Take 1 tablet by mouth daily      Omega-3 Fatty Acids (FISH OIL PO) Take 2 g by mouth      Turmeric (Curcumin 95) 500 MG CAPS       VITAMIN C, CALCIUM ASCORBATE, PO Take by mouth       No current facility-administered medications for this visit           Review of Systems   All other systems reviewed and are negative  Objective:      /64   Pulse 69   Temp 97 8 °F (36 6 °C) (Tympanic)   Ht 5' 4" (1 626 m)   Wt 57 3 kg (126 lb 6 4 oz)   SpO2 98%   BMI 21 70 kg/m²          Physical Exam  Vitals signs reviewed  Constitutional:       General: She is not in acute distress  Appearance: Normal appearance  She is well-developed  She is not ill-appearing  HENT:      Head: Normocephalic  Right Ear: Hearing, tympanic membrane, ear canal and external ear normal  There is no impacted cerumen  Left Ear: Hearing, tympanic membrane, ear canal and external ear normal  There is no impacted cerumen  Nose: Nose normal  No mucosal edema or congestion  Mouth/Throat:      Mouth: Mucous membranes are moist       Pharynx: Oropharynx is clear  Uvula midline  No oropharyngeal exudate  Eyes:      General: Lids are normal  No scleral icterus  Right eye: No discharge  Left eye: No discharge  Extraocular Movements: Extraocular movements intact  Conjunctiva/sclera: Conjunctivae normal       Pupils: Pupils are equal, round, and reactive to light  Neck:      Musculoskeletal: Normal range of motion and neck supple  No neck rigidity  Thyroid: No thyromegaly  Vascular: No carotid bruit or JVD  Cardiovascular:      Rate and Rhythm: Normal rate and regular rhythm  Heart sounds: Normal heart sounds  No murmur  Pulmonary:      Effort: Pulmonary effort is normal  No respiratory distress  Breath sounds: Normal breath sounds  No wheezing, rhonchi or rales  Abdominal:      General: Bowel sounds are normal  There is no distension  Palpations: Abdomen is soft  There is no mass  Tenderness: There is no abdominal tenderness  There is no guarding  Musculoskeletal: Normal range of motion  General: No swelling or tenderness  Right lower leg: No edema  Left lower leg: No edema     Lymphadenopathy: Cervical: No cervical adenopathy  Skin:     General: Skin is warm and dry  Capillary Refill: Capillary refill takes less than 2 seconds  Coloration: Skin is not jaundiced or pale  Neurological:      Mental Status: She is alert and oriented to person, place, and time  Mental status is at baseline  Cranial Nerves: No cranial nerve deficit  Motor: No weakness  Coordination: Coordination normal       Gait: Gait normal       Deep Tendon Reflexes: Reflexes are normal and symmetric  Reflexes normal    Psychiatric:         Mood and Affect: Mood normal          Speech: Speech normal          Behavior: Behavior normal  Behavior is cooperative  Thought Content:  Thought content normal          Judgment: Judgment normal

## 2021-03-30 NOTE — ASSESSMENT & PLAN NOTE
Assessment of the patient's lipid profile with her today indicates an elevating LDL value  We reviewed this with the patient in detail today providing with patient education material to try to modify her diet in an effort to reduce the LDL values  I have requested a follow-up test in 6 months at which time will review the results with the patient during an office visit  If values remain elevated in the range that they are currently consideration for introduction of a low-dose of statin medication at that time

## 2021-06-25 ENCOUNTER — OFFICE VISIT (OUTPATIENT)
Dept: INTERNAL MEDICINE CLINIC | Facility: CLINIC | Age: 66
End: 2021-06-25
Payer: COMMERCIAL

## 2021-06-25 VITALS
WEIGHT: 121.2 LBS | OXYGEN SATURATION: 98 % | BODY MASS INDEX: 20.69 KG/M2 | RESPIRATION RATE: 16 BRPM | HEIGHT: 64 IN | TEMPERATURE: 97.8 F | HEART RATE: 71 BPM | DIASTOLIC BLOOD PRESSURE: 80 MMHG | SYSTOLIC BLOOD PRESSURE: 120 MMHG

## 2021-06-25 DIAGNOSIS — F51.04 PSYCHOPHYSIOLOGICAL INSOMNIA: Primary | ICD-10-CM

## 2021-06-25 DIAGNOSIS — B33.0 EPIDEMIC MYALGIA: ICD-10-CM

## 2021-06-25 DIAGNOSIS — F41.9 ANXIETY: ICD-10-CM

## 2021-06-25 PROCEDURE — 3008F BODY MASS INDEX DOCD: CPT | Performed by: NURSE PRACTITIONER

## 2021-06-25 PROCEDURE — 1036F TOBACCO NON-USER: CPT | Performed by: NURSE PRACTITIONER

## 2021-06-25 PROCEDURE — 3725F SCREEN DEPRESSION PERFORMED: CPT | Performed by: NURSE PRACTITIONER

## 2021-06-25 PROCEDURE — 99214 OFFICE O/P EST MOD 30 MIN: CPT | Performed by: NURSE PRACTITIONER

## 2021-06-25 RX ORDER — MOMETASONE FUROATE 1 MG/G
CREAM TOPICAL
COMMUNITY
Start: 2021-06-22

## 2021-06-25 RX ORDER — TRAZODONE HYDROCHLORIDE 50 MG/1
50 TABLET ORAL
Qty: 30 TABLET | Refills: 0 | Status: SHIPPED | OUTPATIENT
Start: 2021-06-25 | End: 2021-07-19

## 2021-06-25 RX ORDER — ALPRAZOLAM 0.25 MG/1
0.25 TABLET ORAL DAILY PRN
Qty: 30 TABLET | Refills: 0 | Status: SHIPPED | OUTPATIENT
Start: 2021-06-25 | End: 2021-09-27 | Stop reason: SDUPTHER

## 2021-06-25 NOTE — ASSESSMENT & PLAN NOTE
Anxiety related to increased stress  She states she used to take alprazolam in the past as needed, would like to again have this available to use as needed  PDMP reviewed, no record of misuse  Rx sent  Continue to work on meditation and stress reduction methods

## 2021-06-25 NOTE — PROGRESS NOTES
Assessment/Plan:    Depression Screening and Follow-up Plan: Patient's depression screening was positive with a PHQ-2 score of 3  Their PHQ-9 score was 12  Clincally patient does not have depression  No treatment is required  Psychophysiological insomnia  Increased stress for the last 6 weeks is causing disruption to sleep maintenance  Has had intermittent success with meditation and does not tolerate tylenol PM or melatonin  Positive responses on phq screening are likely related to lack of adequate sleep  She would like to take medication to help, will try trazodone 50 mg qhs  Will fu at next visit, call if not tolerating medication or if it does not work  Anxiety  Anxiety related to increased stress  She states she used to take alprazolam in the past as needed, would like to again have this available to use as needed  PDMP reviewed, no record of misuse  Rx sent  Continue to work on meditation and stress reduction methods  Epidemic myalgia  Episodes of random, migratory muscle aches have been occurring for about 10 days  Episodes are self-limited and not impacting ability to be active  Exam is normal   Most recent labs reviewed and non-contributory  Reassured pt that symptoms are most likely a physical manifestation of stress and anxiety  Will be treating both anxiety and insomnia as discussed and fu at next visit  Call if sx worsen  Diagnoses and all orders for this visit:    Psychophysiological insomnia  -     traZODone (DESYREL) 50 mg tablet; Take 1 tablet (50 mg total) by mouth daily at bedtime    Anxiety  -     ALPRAZolam (XANAX) 0 25 mg tablet; Take 1 tablet (0 25 mg total) by mouth daily as needed for anxiety    Epidemic myalgia    Other orders  -     mometasone (ELOCON) 0 1 % cream          Subjective:      Patient ID: Anyi Rutledge is a 63 y o  female  Pt is a 62 y/o female here for evaluation of muscle aches    She states this started about 10 days ago; describes experiencing random muscle aches that come on without trigger  Episodes occur at different body parts and says the episodes are fleeting  Compares it to feeling achy like after a vaccine  She denies weakness, numbness, tingling  She has not noticed any skin changes  She also states she is not sleeping well  She has been using meditation videos and tries a half dose of tylenol PM   She does not tolerate higher dose and does not tolerate melatonin  She has had some palpitations on occasion as well, not associated with cp, dizziness, sob, vision changes  She has been under increased stress for about 6 weeks, and even more so in the past week  She is eating 3 balanced meals per day, feels she is well hydrated  She has been exercising 5 days per week and tolerating activity well  Does not seem to trigger muscle aches or the palpitations  The following portions of the patient's history were reviewed and updated as appropriate: allergies, current medications, past family history, past medical history, past social history, past surgical history and problem list     Review of Systems   Constitutional: Negative for activity change, appetite change, chills, diaphoresis, fatigue, fever and unexpected weight change  HENT: Negative for hearing loss  Eyes: Negative for visual disturbance  Respiratory: Negative for cough, chest tightness and shortness of breath  Cardiovascular: Negative for chest pain, palpitations and leg swelling  Gastrointestinal: Negative for constipation, diarrhea, nausea and vomiting  Genitourinary: Negative for dysuria and frequency  Musculoskeletal: Positive for myalgias  Negative for arthralgias  Allergic/Immunologic: Negative for environmental allergies  Neurological: Negative for dizziness, weakness, numbness and headaches  Psychiatric/Behavioral: Positive for sleep disturbance  The patient is nervous/anxious            Objective:      /80 (BP Location: Left arm, Patient Position: Sitting)   Pulse 71   Temp 97 8 °F (36 6 °C) (Skin)   Resp 16   Ht 5' 4" (1 626 m)   Wt 55 kg (121 lb 3 2 oz)   SpO2 98%   BMI 20 80 kg/m²          Physical Exam  Vitals reviewed  Constitutional:       General: She is awake  She is not in acute distress  Appearance: Normal appearance  She is well-developed and well-groomed  She is not ill-appearing, toxic-appearing or diaphoretic  Eyes:      General: Lids are normal       Conjunctiva/sclera: Conjunctivae normal    Cardiovascular:      Rate and Rhythm: Normal rate and regular rhythm  Pulses: Normal pulses  Heart sounds: Normal heart sounds  No murmur heard  Pulmonary:      Effort: Pulmonary effort is normal       Breath sounds: Normal breath sounds  Abdominal:      General: Abdomen is flat  Bowel sounds are normal       Palpations: Abdomen is soft  Tenderness: There is no abdominal tenderness  Musculoskeletal:         General: Normal range of motion  Right lower leg: No edema  Left lower leg: No edema  Skin:     General: Skin is warm and dry  Neurological:      Mental Status: She is alert and oriented to person, place, and time  Motor: Motor function is intact  Deep Tendon Reflexes: Reflexes are normal and symmetric  Psychiatric:         Attention and Perception: Attention normal          Mood and Affect: Mood normal          Speech: Speech normal          Behavior: Behavior normal  Behavior is cooperative  Thought Content:  Thought content normal          Cognition and Memory: Cognition normal          Judgment: Judgment normal

## 2021-06-25 NOTE — ASSESSMENT & PLAN NOTE
Episodes of random, migratory muscle aches have been occurring for about 10 days  Episodes are self-limited and not impacting ability to be active  Exam is normal   Most recent labs reviewed and non-contributory  Reassured pt that symptoms are most likely a physical manifestation of stress and anxiety  Will be treating both anxiety and insomnia as discussed and fu at next visit  Call if sx worsen

## 2021-06-25 NOTE — ASSESSMENT & PLAN NOTE
Increased stress for the last 6 weeks is causing disruption to sleep maintenance  Has had intermittent success with meditation and does not tolerate tylenol PM or melatonin  Positive responses on phq screening are likely related to lack of adequate sleep  She would like to take medication to help, will try trazodone 50 mg qhs  Will fu at next visit, call if not tolerating medication or if it does not work

## 2021-06-30 ENCOUNTER — HOSPITAL ENCOUNTER (OUTPATIENT)
Dept: RADIOLOGY | Age: 66
Discharge: HOME | End: 2021-06-30
Payer: COMMERCIAL

## 2021-06-30 VITALS — BODY MASS INDEX: 20.66 KG/M2 | HEIGHT: 64 IN | WEIGHT: 121 LBS

## 2021-06-30 DIAGNOSIS — Z12.31 ENCOUNTER FOR SCREENING MAMMOGRAM FOR MALIGNANT NEOPLASM OF BREAST: ICD-10-CM

## 2021-06-30 DIAGNOSIS — Z12.39 BREAST CANCER SCREENING: ICD-10-CM

## 2021-06-30 PROCEDURE — 77067 SCR MAMMO BI INCL CAD: CPT

## 2021-06-30 PROCEDURE — 77063 BREAST TOMOSYNTHESIS BI: CPT

## 2021-07-18 DIAGNOSIS — F51.04 PSYCHOPHYSIOLOGICAL INSOMNIA: ICD-10-CM

## 2021-07-19 RX ORDER — TRAZODONE HYDROCHLORIDE 50 MG/1
TABLET ORAL
Qty: 30 TABLET | Refills: 0 | Status: SHIPPED | OUTPATIENT
Start: 2021-07-19 | End: 2021-08-12

## 2021-07-23 ENCOUNTER — TELEPHONE (OUTPATIENT)
Dept: INTERNAL MEDICINE CLINIC | Facility: CLINIC | Age: 66
End: 2021-07-23

## 2021-07-23 ENCOUNTER — APPOINTMENT (OUTPATIENT)
Dept: LAB | Facility: CLINIC | Age: 66
End: 2021-07-23
Payer: COMMERCIAL

## 2021-07-23 DIAGNOSIS — E78.5 HYPERLIPIDEMIA, UNSPECIFIED HYPERLIPIDEMIA TYPE: ICD-10-CM

## 2021-07-23 LAB
CHOLEST SERPL-MCNC: 189 MG/DL (ref 50–200)
HDLC SERPL-MCNC: 80 MG/DL
LDLC SERPL CALC-MCNC: 99 MG/DL (ref 0–100)
NONHDLC SERPL-MCNC: 109 MG/DL
TRIGL SERPL-MCNC: 48 MG/DL

## 2021-07-23 PROCEDURE — 80061 LIPID PANEL: CPT

## 2021-07-23 PROCEDURE — 36415 COLL VENOUS BLD VENIPUNCTURE: CPT

## 2021-07-23 NOTE — TELEPHONE ENCOUNTER
Pt's next appt was originally scheduled for 9/22  She moved up this appt to 8/12  Pt tried to get her lipid panel done today and was told that the date on the lab order needs to be changed  Please update order and I will call pt back at [phone] once done      Thank you

## 2021-07-23 NOTE — TELEPHONE ENCOUNTER
That the patient know that a new order has been entered into the system for a lipid profile today or whenever she is able to go to the lab thank you

## 2021-08-09 ENCOUNTER — OFFICE VISIT (OUTPATIENT)
Dept: PHYSICAL THERAPY | Facility: REHABILITATION | Age: 66
End: 2021-08-09
Payer: COMMERCIAL

## 2021-08-09 DIAGNOSIS — M25.552 ACUTE HIP PAIN, LEFT: Primary | ICD-10-CM

## 2021-08-09 PROCEDURE — 97161 PT EVAL LOW COMPLEX 20 MIN: CPT | Performed by: PHYSICAL THERAPIST

## 2021-08-09 NOTE — PROGRESS NOTES
PT Evaluation     Today's date: 2021  Patient name: Mara Hernandez  : 1955  MRN: [id number]  Referring provider: Cyndi Mitchell, JOEL  Dx:   Encounter Diagnosis     ICD-10-CM    1  Acute hip pain, left  M25 552        Start Time:   Stop Time:   Total time in clinic (min): 30 minutes    Assessment  Assessment details: Mara Hernandez is a 63 y o  female presenting to outpatient physical therapy on 21with referral from [facility] for left posterior hip pain located in posterior gluteal region with pain upon transitional movement and inactivity  Upon evaluation, Мария Nj demonstrates impaired left hip mobility, mainly into IR as well as weakness in glute max  The listed impairments and functional limitation are effecting Мария Nj ability to function at prior level  They can continue to benefit from physical therapy services at this times in order to address the above discussed impairments and functional limitation in order to allow for a return to premorbid status      Patient reports less pain post session "feeling really good "    HEP: self STM posterior gluteals, SL bridge    Impairments: abnormal muscle firing, abnormal muscle tone, abnormal or restricted ROM, abnormal movement, activity intolerance, impaired physical strength and pain with function  Understanding of Dx/Px/POC: good   Prognosis: good    Plan  Patient would benefit from: skilled PT  Planned modality interventions: thermotherapy: hydrocollator packs  Planned therapy interventions: joint mobilization, manual therapy, ADL training, neuromuscular re-education, home exercise program, therapeutic exercise, therapeutic activities, strengthening, patient education and functional ROM exercises  Frequency: 2x week  Duration in visits: 8  Duration in weeks: 4  Plan of Care beginning date: 2021  Plan of Care expiration date: 2021  Treatment plan discussed with: patient        Subjective Evaluation    History of Present Illness  Mechanism of injury: Elva Og is a 63 y o  female presenting to physical therapy on 21 with referral from [facility] for acute on chronic L sided hip pain  Patient was treated in 2020 for similar pain/discomfort but states that her pain at this time is similar, not nearly as severe  She denies any specific LUCIEN, began feeling pain about 2 weeks ago of gradual onset  Denies any radicular pain or LBP at this time  Pain unchanged with spinal movements  Patient points to posterior gluteal region in regards to pain    Pain with transitional movements, especially if inactivity  Recurrent probem    Quality of life: good    Pain  Current pain ratin  At worst pain ratin  Location: left posterior hip - gluteal region - superior illiac crest      Diagnostic Tests  No diagnostic tests performed  Treatments  No previous or current treatments  Patient Goals  Patient goals for therapy: decreased pain and return to sport/leisure activities  Patient goal: golf, play tennis, walk pain free    Short Term Goals:   1  Patient will be Independent with hep  2  Patient will improve pain with activity by 50%  3  Patient will improve L glute max MMT to 4+/5  4  Patient will have pain free transitional movements over 1 weeks time  Long Term Goals:   1  Patient will improve FOTO to greater then goal  2  Patient will improve pain with activity to 0/10 or less  3  Patient will continue with HEP independence to allow for decreased future reoccurrence of pain and loss in function  4  Patient will resume all ADLs without limitations/pain  5  Patient will resume all recreational activity with limitation/pain including golfing and tennis        Objective    Posture: flat LS  Palpation: TTP L posterior glute (max)    Dermatome: (pinprick- L/R):  Normal LE        GAIT: normal      Lumbar  % of normal   Flex  WNL   Extn   WNL   SB Left ERP L gluteal   SB Right WNL   ROT Left ERP L gluteal   ROT Right WNL Repetitive testing: NT        MMT         AROM          PROM    Hip       L       R        L           R      L     R   Flex  Extn  Abd  Add  IR      10 20   ER  4 4                G  Max 4 4+       G  Med  4 4       Iliop               Neuro Dynamic Testing:  Crossed Straight leg raise:   L= neg   R=   Pain in L posterior gluteal region              SI joint:          Provocation testing: Compression=  neg   Distraction= neg              Hip:    capsular mobility=  neg   90/90 Hamstring length= neg              Segmental mobility:   LS=  Hypomobile L3-5 into R side glide                 Precautions: standard       Manuals 8/9            L hip LAD sustained, w  harmonics            L hip lateral distraction belt            MWM IR Belt, 10x  5x with CR end range hold                         Neuro Re-Ed 8/9            Self STM post glute HEP            SL bridge 3x5  3" hold            Clam             Standing hip ABD (45 deg)             HHR prn                                       Ther Ex             Seated glute max stretch             Bike 5' NV                                                                                          Ther Activity                                       Gait Training                                       Modalities

## 2021-08-12 ENCOUNTER — OFFICE VISIT (OUTPATIENT)
Dept: PHYSICAL THERAPY | Facility: REHABILITATION | Age: 66
End: 2021-08-12
Payer: COMMERCIAL

## 2021-08-12 ENCOUNTER — OFFICE VISIT (OUTPATIENT)
Dept: INTERNAL MEDICINE CLINIC | Facility: CLINIC | Age: 66
End: 2021-08-12
Payer: COMMERCIAL

## 2021-08-12 VITALS
BODY MASS INDEX: 20.49 KG/M2 | HEIGHT: 64 IN | RESPIRATION RATE: 16 BRPM | SYSTOLIC BLOOD PRESSURE: 112 MMHG | OXYGEN SATURATION: 98 % | WEIGHT: 120 LBS | HEART RATE: 70 BPM | DIASTOLIC BLOOD PRESSURE: 62 MMHG

## 2021-08-12 DIAGNOSIS — F41.9 ANXIETY: ICD-10-CM

## 2021-08-12 DIAGNOSIS — R22.32 LOCALIZED SWELLING OF LEFT THUMB: ICD-10-CM

## 2021-08-12 DIAGNOSIS — F51.04 PSYCHOPHYSIOLOGICAL INSOMNIA: ICD-10-CM

## 2021-08-12 DIAGNOSIS — Z23 NEED FOR SHINGLES VACCINE: ICD-10-CM

## 2021-08-12 DIAGNOSIS — M25.552 ACUTE HIP PAIN, LEFT: Primary | ICD-10-CM

## 2021-08-12 DIAGNOSIS — B36.9 FUNGAL SKIN INFECTION: ICD-10-CM

## 2021-08-12 DIAGNOSIS — E78.5 HYPERLIPIDEMIA, UNSPECIFIED HYPERLIPIDEMIA TYPE: Primary | ICD-10-CM

## 2021-08-12 PROBLEM — B33.0: Status: RESOLVED | Noted: 2021-06-25 | Resolved: 2021-08-12

## 2021-08-12 PROCEDURE — 97140 MANUAL THERAPY 1/> REGIONS: CPT | Performed by: PHYSICAL THERAPIST

## 2021-08-12 PROCEDURE — 97112 NEUROMUSCULAR REEDUCATION: CPT | Performed by: PHYSICAL THERAPIST

## 2021-08-12 PROCEDURE — 3008F BODY MASS INDEX DOCD: CPT | Performed by: INTERNAL MEDICINE

## 2021-08-12 PROCEDURE — 90471 IMMUNIZATION ADMIN: CPT | Performed by: INTERNAL MEDICINE

## 2021-08-12 PROCEDURE — 1036F TOBACCO NON-USER: CPT | Performed by: INTERNAL MEDICINE

## 2021-08-12 PROCEDURE — 90750 HZV VACC RECOMBINANT IM: CPT | Performed by: INTERNAL MEDICINE

## 2021-08-12 PROCEDURE — 99215 OFFICE O/P EST HI 40 MIN: CPT | Performed by: INTERNAL MEDICINE

## 2021-08-12 RX ORDER — TRAZODONE HYDROCHLORIDE 50 MG/1
75 TABLET ORAL
Qty: 45 TABLET | Refills: 2 | Status: SHIPPED | OUTPATIENT
Start: 2021-08-12 | End: 2021-08-12

## 2021-08-12 RX ORDER — KETOCONAZOLE 20 MG/G
CREAM TOPICAL DAILY
Qty: 15 G | Refills: 0 | Status: SHIPPED | OUTPATIENT
Start: 2021-08-12 | End: 2021-11-30 | Stop reason: ALTCHOICE

## 2021-08-12 NOTE — ASSESSMENT & PLAN NOTE
Rash of the skin consistent with a ring warm type of fungal infection recommend ketoconazole topical cream applied daily at bedtime until resolved

## 2021-08-12 NOTE — ASSESSMENT & PLAN NOTE
Significant improvement in the patient's cholesterol values she has made a excellent impact on her cholesterol with lifestyle adjustments regular recommend continued current therapy of healthy diet exercise and weight control    Follow-up testing with next annual physical exam in 6 months

## 2021-08-12 NOTE — ASSESSMENT & PLAN NOTE
Soft tissue swelling of the medial aspect of the left thumb does not appear to be a arthritic process may be traumatic will observe for now if it persist the patient was provided with an x-ray request of the hand

## 2021-08-12 NOTE — ASSESSMENT & PLAN NOTE
Ongoing difficulty with sleep  Patient is presently on trazodone 50 mg of recommend that she try increasing this to 75 mg daily    Also reviewed avoiding computer and technology equipment for at least 1 hour prior to bedtime along with daily exercise and winding down time prior to bedtime

## 2021-08-12 NOTE — ASSESSMENT & PLAN NOTE
Anxiety symptoms recommend continued use of alprazolam on an as-needed basis no indication of abuse or dependence on this medication current dose is 0 25 mg which can be taken on a Q 8 hour p r n  basis

## 2021-08-12 NOTE — PROGRESS NOTES
Daily Note     Today's date: 2021  Patient name: Ofelia Trinh  : 1955  MRN: [id number]  Referring provider: Parish Mcfarlane, PT  Dx:   Encounter Diagnosis     ICD-10-CM    1  Acute hip pain, left  M25 552        Start Time: 1403  Stop Time: 1443  Total time in clinic (min): 40 minutes    Subjective: Reports her HEP is going well and the hip is feeling better  Objective: See treatment diary below      Assessment: Tolerated treatment well  Hip IR ROM is limited, but improved with mobilizations  Pt responded well to addition of clamshells and standing hip abd  Plan: Continue per plan of care  Progress treatment as tolerated         Precautions: standard       Manuals            L hip LAD sustained, w  harmonics DS           L hip lateral distraction belt belt -DS           MWM IR Belt, 10x  5x with CR end range hold Belt x15           STM L glute             Neuro Re-Ed             Self STM post glute HEP            SL bridge 3x5  3" hold            Clam  LLE x20           Standing hip ABD (45 deg)  peach x20 ea           HHR prn                                       Ther Ex             Seated glute max stretch             Bike 5' NV L1 5'                                                                                         Ther Activity                                       Gait Training                                       Modalities

## 2021-08-12 NOTE — PROGRESS NOTES
Assessment/Plan:    Fungal skin infection    Rash of the skin consistent with a ring warm type of fungal infection recommend ketoconazole topical cream applied daily at bedtime until resolved    Psychophysiological insomnia   Ongoing difficulty with sleep  Patient is presently on trazodone 50 mg of recommend that she try increasing this to 75 mg daily  Also reviewed avoiding computer and technology equipment for at least 1 hour prior to bedtime along with daily exercise and winding down time prior to bedtime    Localized swelling of left thumb   Soft tissue swelling of the medial aspect of the left thumb does not appear to be a arthritic process may be traumatic will observe for now if it persist the patient was provided with an x-ray request of the hand    Hyperlipidemia   Significant improvement in the patient's cholesterol values she has made a excellent impact on her cholesterol with lifestyle adjustments regular recommend continued current therapy of healthy diet exercise and weight control  Follow-up testing with next annual physical exam in 6 months    Anxiety   Anxiety symptoms recommend continued use of alprazolam on an as-needed basis no indication of abuse or dependence on this medication current dose is 0 25 mg which can be taken on a Q 8 hour p r n  basis       Diagnoses and all orders for this visit:    Need for shingles vaccine  -     Zoster Vaccine Recombinant IM    Localized swelling of left thumb  -     XR hand 3+ vw left; Future    Fungal skin infection  -     ketoconazole (NIZORAL) 2 % cream; Apply topically daily    Psychophysiological insomnia  -     Discontinue: traZODone (DESYREL) 50 mg tablet; Take 1 5 tablets (75 mg total) by mouth daily at bedtime    Hyperlipidemia, unspecified hyperlipidemia type        Subjective:      Patient ID: Elda Hendrix is a 63 y o  female  This 24-year-old female patient returns today for follow-up assessment of her cholesterol    She has done exceptionally good job of managing to reduce her cholesterol values through lifestyle management  I have encouraged her to continue with her efforts to manage her cholesterol with exercise weight control and balance diet  In addition the patient has presented today with a small rash that appears to be a fungal infection of the skin  She also presents with symptoms of ongoing insomnia and swelling of the thumb  The following portions of the patient's history were reviewed and updated as appropriate:   She  has a past medical history of Allergic contact dermatitis, Allergic to insect bites and stings, Conjunctivitis, Herpes simplex infection, Laryngitis, Pharyngitis, and Pneumonia  She   Patient Active Problem List    Diagnosis Date Noted    Localized swelling of left thumb 2021    Fungal skin infection 2021    Psychophysiological insomnia 2021    Anxiety 2021    Healthcare maintenance 2021    Osteopenia of multiple sites 2020    Hyperlipidemia 2019     She  has a past surgical history that includes  section and Mouth surgery  Her family history includes Atrial fibrillation in her family; Cancer in her family and paternal grandmother; Glaucoma in her mother; Heart disease in her family, maternal grandmother, and mother; Hypertension in her family and mother; Lung cancer in her maternal aunt; Melanoma in her brother; No Known Problems in her brother, daughter, maternal grandfather, paternal aunt, paternal aunt, paternal aunt, paternal aunt, paternal aunt, paternal grandfather, sister, and son; Parkinsonism in her family and father  She  reports that she has never smoked  She has never used smokeless tobacco  She reports current alcohol use  She reports that she does not use drugs    Current Outpatient Medications   Medication Sig Dispense Refill    ALPRAZolam (XANAX) 0 25 mg tablet Take 1 tablet (0 25 mg total) by mouth daily as needed for anxiety 30 tablet 0    b complex vitamins tablet Take 1 tablet by mouth daily      Cholecalciferol 1000 units capsule Take 2,000 Units by mouth daily      Magnesium 100 MG CAPS Take by mouth      mometasone (ELOCON) 0 1 % cream       multivitamin (THERAGRAN) TABS Take 1 tablet by mouth daily      Omega-3 Fatty Acids (FISH OIL PO) Take 2 g by mouth      VITAMIN C, CALCIUM ASCORBATE, PO Take by mouth      ketoconazole (NIZORAL) 2 % cream Apply topically daily 15 g 0    traZODone (DESYREL) 50 mg tablet TAKE 1 5 TABLETS (75 MG TOTAL) BY MOUTH DAILY AT BEDTIME 135 tablet 2     No current facility-administered medications for this visit       Review of Systems   Musculoskeletal:        Swollen thumb left hand   Skin: Positive for rash  Psychiatric/Behavioral: Positive for sleep disturbance  All other systems reviewed and are negative  Objective:      /62   Pulse 70   Resp 16   Ht 5' 4" (1 626 m)   Wt 54 4 kg (120 lb)   SpO2 98%   BMI 20 60 kg/m²          Physical Exam  Vitals reviewed  Constitutional:       General: She is not in acute distress  Appearance: Normal appearance  She is well-developed  She is not ill-appearing  HENT:      Head: Normocephalic  Right Ear: Hearing and external ear normal       Left Ear: Hearing and external ear normal       Nose: Nose normal  No mucosal edema  Mouth/Throat:      Pharynx: Uvula midline  Eyes:      General: Lids are normal       Conjunctiva/sclera: Conjunctivae normal       Pupils: Pupils are equal, round, and reactive to light  Neck:      Thyroid: No thyromegaly  Vascular: No carotid bruit or JVD  Cardiovascular:      Rate and Rhythm: Normal rate and regular rhythm  Heart sounds: Normal heart sounds  No murmur heard  Pulmonary:      Effort: Pulmonary effort is normal  No respiratory distress  Breath sounds: Normal breath sounds  No wheezing or rales     Abdominal:      General: Bowel sounds are normal  Palpations: Abdomen is soft  Musculoskeletal:         General: Normal range of motion  Comments: Soft tissue swelling left thumb   Lymphadenopathy:      Cervical: No cervical adenopathy  Skin:     General: Skin is warm and dry  Comments: Fungal infection of the skin   Neurological:      Mental Status: She is alert and oriented to person, place, and time  Deep Tendon Reflexes: Reflexes are normal and symmetric  Reflexes normal    Psychiatric:         Speech: Speech normal          Behavior: Behavior normal  Behavior is cooperative  Thought Content:  Thought content normal          Judgment: Judgment normal

## 2021-08-16 ENCOUNTER — OFFICE VISIT (OUTPATIENT)
Dept: PHYSICAL THERAPY | Facility: REHABILITATION | Age: 66
End: 2021-08-16
Payer: COMMERCIAL

## 2021-08-16 DIAGNOSIS — M25.552 ACUTE HIP PAIN, LEFT: Primary | ICD-10-CM

## 2021-08-16 PROCEDURE — 97112 NEUROMUSCULAR REEDUCATION: CPT | Performed by: PHYSICAL THERAPIST

## 2021-08-16 PROCEDURE — 97140 MANUAL THERAPY 1/> REGIONS: CPT | Performed by: PHYSICAL THERAPIST

## 2021-08-17 ENCOUNTER — TELEPHONE (OUTPATIENT)
Dept: INTERNAL MEDICINE CLINIC | Facility: CLINIC | Age: 66
End: 2021-08-17

## 2021-08-17 NOTE — TELEPHONE ENCOUNTER
Patient just wanted to inform you that the trazodone for sleep gave her hallucinations and she will  No longer be taking it  She has since tried an over the counter cbd + Melatonin gummy and that has helped her  She will be trying the OTC medication for now and just wanted to inform you of stopping the med

## 2021-08-19 ENCOUNTER — OFFICE VISIT (OUTPATIENT)
Dept: PHYSICAL THERAPY | Facility: REHABILITATION | Age: 66
End: 2021-08-19
Payer: COMMERCIAL

## 2021-08-19 DIAGNOSIS — M25.552 ACUTE HIP PAIN, LEFT: Primary | ICD-10-CM

## 2021-08-19 PROCEDURE — 97112 NEUROMUSCULAR REEDUCATION: CPT | Performed by: PHYSICAL THERAPIST

## 2021-08-19 PROCEDURE — 97140 MANUAL THERAPY 1/> REGIONS: CPT | Performed by: PHYSICAL THERAPIST

## 2021-08-19 NOTE — PROGRESS NOTES
Daily Note     Today's date: 2021  Patient name: Elda Hendrix  : 1955  MRN: [id number]  Referring provider: Mohamud Mendoza, PT  Dx:   Encounter Diagnosis     ICD-10-CM    1  Acute hip pain, left  M25 552        Start Time: 1550  Stop Time: 1625  Total time in clinic (min): 35 minutes    Subjective: Patient reports feeling well, c/o no pain at this time, golfed and walked a lot today without pain      Objective: See treatment diary below  FOTO goal MET      Assessment: Tolerated treatment well  Added HHR to HEP to maintain hip mobility gains and improving/maintaining hip PICR  Likely DC NV      Plan: Continue per plan of care        Precautions: standard       Manuals          L hip LAD sustained, w  harmonics DS AB AB         L hip lateral distraction belt belt -DS Belt - aB Belt-AB         MWM IR Belt, 10x  5x with CR end range hold Belt x15 2x10  AB 3x10         STM L glute   AB - w/ and w/o clam          Neuro Re-Ed          Self STM post glute HEP            SL bridge 3x5  3" hold  3x5  3" hold          Clam  LLE x20  GMB  3x8         Standing hip ABD (45 deg)  peach x20 ea GMB  3x8 ea side GMB 3x8         HHR prn    20x         SL hip IR (rev clam)    2x10                      Ther Ex             Seated glute max stretch             Bike 5' NV L1 5'                                                                                         Ther Activity                                       Gait Training                                       Modalities

## 2021-08-26 ENCOUNTER — APPOINTMENT (OUTPATIENT)
Dept: PHYSICAL THERAPY | Facility: REHABILITATION | Age: 66
End: 2021-08-26
Payer: COMMERCIAL

## 2021-09-08 ENCOUNTER — APPOINTMENT (OUTPATIENT)
Dept: RADIOLOGY | Age: 66
End: 2021-09-08
Payer: COMMERCIAL

## 2021-09-08 DIAGNOSIS — R22.32 LOCALIZED SWELLING OF LEFT THUMB: ICD-10-CM

## 2021-09-08 PROCEDURE — 73130 X-RAY EXAM OF HAND: CPT

## 2021-09-08 NOTE — PROGRESS NOTES
Spoke with patient on 9/8/2021  She is doing well, has not had any pain or discomfort with her usually ADLs and recreational activity  Feels she is fine to DC  Reports HEP compliance

## 2021-09-27 DIAGNOSIS — F41.9 ANXIETY: ICD-10-CM

## 2021-09-27 RX ORDER — ALPRAZOLAM 0.25 MG/1
0.25 TABLET ORAL DAILY PRN
Qty: 30 TABLET | Refills: 0 | Status: SHIPPED | OUTPATIENT
Start: 2021-09-27 | End: 2021-11-30 | Stop reason: ALTCHOICE

## 2021-10-18 ENCOUNTER — ANNUAL EXAM (OUTPATIENT)
Dept: OBGYN CLINIC | Facility: CLINIC | Age: 66
End: 2021-10-18
Payer: COMMERCIAL

## 2021-10-18 VITALS
DIASTOLIC BLOOD PRESSURE: 60 MMHG | HEIGHT: 64 IN | BODY MASS INDEX: 20.14 KG/M2 | WEIGHT: 118 LBS | SYSTOLIC BLOOD PRESSURE: 110 MMHG

## 2021-10-18 DIAGNOSIS — M85.80 OSTEOPENIA, UNSPECIFIED LOCATION: ICD-10-CM

## 2021-10-18 DIAGNOSIS — Z12.31 ENCOUNTER FOR SCREENING MAMMOGRAM FOR BREAST CANCER: ICD-10-CM

## 2021-10-18 DIAGNOSIS — Z01.419 ENCOUNTER FOR ANNUAL ROUTINE GYNECOLOGICAL EXAMINATION: Primary | ICD-10-CM

## 2021-10-18 PROCEDURE — 1036F TOBACCO NON-USER: CPT | Performed by: OBSTETRICS & GYNECOLOGY

## 2021-10-18 PROCEDURE — 3725F SCREEN DEPRESSION PERFORMED: CPT | Performed by: OBSTETRICS & GYNECOLOGY

## 2021-10-18 PROCEDURE — 99396 PREV VISIT EST AGE 40-64: CPT | Performed by: OBSTETRICS & GYNECOLOGY

## 2021-10-18 PROCEDURE — 3008F BODY MASS INDEX DOCD: CPT | Performed by: OBSTETRICS & GYNECOLOGY

## 2021-10-25 LAB
CLINICAL INFO: NORMAL
CYTOLOGY CMNT CVX/VAG CYTO-IMP: NORMAL
DATE PREVIOUS BX: NORMAL
LMP START DATE: NORMAL
SL AMB PREV. PAP:: NORMAL
SPECIMEN SOURCE CVX/VAG CYTO: NORMAL
STAT OF ADQ CVX/VAG CYTO-IMP: NORMAL

## 2021-11-29 ENCOUNTER — TELEPHONE (OUTPATIENT)
Dept: INTERNAL MEDICINE CLINIC | Facility: CLINIC | Age: 66
End: 2021-11-29

## 2021-11-30 ENCOUNTER — OFFICE VISIT (OUTPATIENT)
Dept: INTERNAL MEDICINE CLINIC | Facility: CLINIC | Age: 66
End: 2021-11-30
Payer: COMMERCIAL

## 2021-11-30 VITALS
HEART RATE: 65 BPM | WEIGHT: 113.2 LBS | OXYGEN SATURATION: 98 % | RESPIRATION RATE: 14 BRPM | HEIGHT: 64 IN | TEMPERATURE: 97.3 F | BODY MASS INDEX: 19.33 KG/M2 | DIASTOLIC BLOOD PRESSURE: 70 MMHG | SYSTOLIC BLOOD PRESSURE: 112 MMHG

## 2021-11-30 DIAGNOSIS — R00.2 PALPITATIONS: Primary | ICD-10-CM

## 2021-11-30 DIAGNOSIS — Z11.4 SCREENING FOR HIV (HUMAN IMMUNODEFICIENCY VIRUS): ICD-10-CM

## 2021-11-30 DIAGNOSIS — F51.01 PRIMARY INSOMNIA: ICD-10-CM

## 2021-11-30 DIAGNOSIS — Z23 ENCOUNTER FOR IMMUNIZATION: ICD-10-CM

## 2021-11-30 DIAGNOSIS — F41.9 ANXIETY: ICD-10-CM

## 2021-11-30 DIAGNOSIS — Z11.59 NEED FOR HEPATITIS C SCREENING TEST: ICD-10-CM

## 2021-11-30 PROBLEM — F51.04 PSYCHOPHYSIOLOGICAL INSOMNIA: Status: RESOLVED | Noted: 2021-06-25 | Resolved: 2021-11-30

## 2021-11-30 PROBLEM — B36.9 FUNGAL SKIN INFECTION: Status: RESOLVED | Noted: 2021-08-12 | Resolved: 2021-11-30

## 2021-11-30 PROBLEM — R22.32 LOCALIZED SWELLING OF LEFT THUMB: Status: RESOLVED | Noted: 2021-08-12 | Resolved: 2021-11-30

## 2021-11-30 PROCEDURE — 93000 ELECTROCARDIOGRAM COMPLETE: CPT | Performed by: INTERNAL MEDICINE

## 2021-11-30 PROCEDURE — 99214 OFFICE O/P EST MOD 30 MIN: CPT | Performed by: INTERNAL MEDICINE

## 2021-11-30 PROCEDURE — 90682 RIV4 VACC RECOMBINANT DNA IM: CPT | Performed by: INTERNAL MEDICINE

## 2021-11-30 PROCEDURE — 90471 IMMUNIZATION ADMIN: CPT | Performed by: INTERNAL MEDICINE

## 2021-11-30 PROCEDURE — 3008F BODY MASS INDEX DOCD: CPT | Performed by: INTERNAL MEDICINE

## 2021-11-30 PROCEDURE — 1036F TOBACCO NON-USER: CPT | Performed by: INTERNAL MEDICINE

## 2021-11-30 RX ORDER — ZOLPIDEM TARTRATE 5 MG/1
TABLET ORAL
Qty: 14 TABLET | Refills: 0 | Status: SHIPPED | OUTPATIENT
Start: 2021-11-30 | End: 2022-01-19 | Stop reason: SDUPTHER

## 2021-12-09 ENCOUNTER — APPOINTMENT (OUTPATIENT)
Dept: LAB | Facility: CLINIC | Age: 66
End: 2021-12-09
Payer: COMMERCIAL

## 2021-12-09 DIAGNOSIS — R00.2 PALPITATIONS: ICD-10-CM

## 2021-12-09 DIAGNOSIS — Z11.59 NEED FOR HEPATITIS C SCREENING TEST: ICD-10-CM

## 2021-12-09 DIAGNOSIS — Z11.4 SCREENING FOR HIV (HUMAN IMMUNODEFICIENCY VIRUS): ICD-10-CM

## 2021-12-09 LAB
ALBUMIN SERPL BCP-MCNC: 3.8 G/DL (ref 3.5–5)
ALP SERPL-CCNC: 66 U/L (ref 46–116)
ALT SERPL W P-5'-P-CCNC: 34 U/L (ref 12–78)
ANION GAP SERPL CALCULATED.3IONS-SCNC: 5 MMOL/L (ref 4–13)
AST SERPL W P-5'-P-CCNC: 29 U/L (ref 5–45)
BILIRUB SERPL-MCNC: 0.33 MG/DL (ref 0.2–1)
BUN SERPL-MCNC: 14 MG/DL (ref 5–25)
CALCIUM SERPL-MCNC: 9.8 MG/DL (ref 8.3–10.1)
CHLORIDE SERPL-SCNC: 104 MMOL/L (ref 100–108)
CO2 SERPL-SCNC: 29 MMOL/L (ref 21–32)
CREAT SERPL-MCNC: 0.68 MG/DL (ref 0.6–1.3)
GFR SERPL CREATININE-BSD FRML MDRD: 93 ML/MIN/1.73SQ M
GLUCOSE SERPL-MCNC: 99 MG/DL (ref 65–140)
HCV AB SER QL: NORMAL
MAGNESIUM SERPL-MCNC: 2.3 MG/DL (ref 1.6–2.6)
POTASSIUM SERPL-SCNC: 4.4 MMOL/L (ref 3.5–5.3)
PROT SERPL-MCNC: 7 G/DL (ref 6.4–8.2)
SODIUM SERPL-SCNC: 138 MMOL/L (ref 136–145)
TSH SERPL DL<=0.05 MIU/L-ACNC: 1.09 UIU/ML (ref 0.36–3.74)

## 2021-12-09 PROCEDURE — 86803 HEPATITIS C AB TEST: CPT

## 2021-12-09 PROCEDURE — 36415 COLL VENOUS BLD VENIPUNCTURE: CPT

## 2021-12-09 PROCEDURE — 87389 HIV-1 AG W/HIV-1&-2 AB AG IA: CPT

## 2021-12-09 PROCEDURE — 83735 ASSAY OF MAGNESIUM: CPT

## 2021-12-09 PROCEDURE — 84443 ASSAY THYROID STIM HORMONE: CPT

## 2021-12-09 PROCEDURE — 80053 COMPREHEN METABOLIC PANEL: CPT

## 2021-12-10 LAB — HIV 1+2 AB+HIV1 P24 AG SERPL QL IA: NORMAL

## 2021-12-13 ENCOUNTER — HOSPITAL ENCOUNTER (OUTPATIENT)
Dept: NON INVASIVE DIAGNOSTICS | Facility: CLINIC | Age: 66
Discharge: HOME | End: 2021-12-13
Payer: COMMERCIAL

## 2021-12-13 ENCOUNTER — HOSPITAL ENCOUNTER (OUTPATIENT)
Dept: NON INVASIVE DIAGNOSTICS | Facility: HOSPITAL | Age: 66
Discharge: HOME | End: 2021-12-13
Payer: COMMERCIAL

## 2021-12-13 VITALS
BODY MASS INDEX: 19.29 KG/M2 | HEART RATE: 65 BPM | DIASTOLIC BLOOD PRESSURE: 70 MMHG | WEIGHT: 113 LBS | HEIGHT: 64 IN | SYSTOLIC BLOOD PRESSURE: 112 MMHG

## 2021-12-13 DIAGNOSIS — R00.2 PALPITATIONS: ICD-10-CM

## 2021-12-13 PROCEDURE — 93306 TTE W/DOPPLER COMPLETE: CPT

## 2021-12-13 PROCEDURE — 93225 XTRNL ECG REC<48 HRS REC: CPT

## 2021-12-13 PROCEDURE — 93226 XTRNL ECG REC<48 HR SCAN A/R: CPT

## 2021-12-13 PROCEDURE — 93306 TTE W/DOPPLER COMPLETE: CPT | Performed by: INTERNAL MEDICINE

## 2021-12-14 ENCOUNTER — RA CDI HCC (OUTPATIENT)
Dept: OTHER | Facility: HOSPITAL | Age: 66
End: 2021-12-14

## 2021-12-14 LAB
AORTIC ROOT: 3 CM
APICAL FOUR CHAMBER EJECTION FRACTION: 65 %
E WAVE DECELERATION TIME: 192 MS
E/A RATIO: 1.34
FRACTIONAL SHORTENING: 39 (ref 28–44)
INTERVENTRICULAR SEPTUM IN DIASTOLE (PARASTERNAL SHORT AXIS VIEW): 0.8 CM
LEFT ATRIUM AREA SYSTOLE SINGLE PLANE A4C: 18 CM2
LEFT INTERNAL DIMENSION IN SYSTOLE: 2.5 CM (ref 2.1–4)
LEFT VENTRICULAR INTERNAL DIMENSION IN DIASTOLE: 4.1 CM (ref 3.65–5.43)
LEFT VENTRICULAR POSTERIOR WALL IN END DIASTOLE: 0.8 CM
LEFT VENTRICULAR STROKE VOLUME: 53 ML
MV E'TISSUE VEL-LAT: 10 CM/S
MV PEAK A VEL: 0.7 M/S
MV PEAK E VEL: 94 CM/S
RA PRESSURE ESTIMATED: 3 MMHG
RIGHT ATRIUM AREA SYSTOLE A4C: 14.6 CM2
RV PSP: 27 MMHG
SL CV LV EF: 65
SL CV PED ECHO LEFT VENTRICLE DIASTOLIC VOLUME (MOD BIPLANE) 2D: 76 ML
SL CV PED ECHO LEFT VENTRICLE SYSTOLIC VOLUME (MOD BIPLANE) 2D: 23 ML
TR MAX PG: 24 MMHG
TRICUSPID VALVE PEAK REGURGITATION VELOCITY: 2.43 M/S
TRICUSPID VALVE S': 0.9 CM/S
Z-SCORE OF LEFT VENTRICULAR DIMENSION IN END SYSTOLE: -0.82

## 2021-12-17 PROCEDURE — 93227 XTRNL ECG REC<48 HR R&I: CPT | Performed by: INTERNAL MEDICINE

## 2021-12-20 ENCOUNTER — OFFICE VISIT (OUTPATIENT)
Dept: INTERNAL MEDICINE CLINIC | Facility: CLINIC | Age: 66
End: 2021-12-20
Payer: COMMERCIAL

## 2021-12-20 VITALS
TEMPERATURE: 97.6 F | HEIGHT: 64 IN | WEIGHT: 115.2 LBS | HEART RATE: 67 BPM | OXYGEN SATURATION: 100 % | BODY MASS INDEX: 19.67 KG/M2

## 2021-12-20 DIAGNOSIS — F51.01 PRIMARY INSOMNIA: ICD-10-CM

## 2021-12-20 DIAGNOSIS — I37.1 NONRHEUMATIC PULMONARY VALVE INSUFFICIENCY: ICD-10-CM

## 2021-12-20 DIAGNOSIS — F41.9 ANXIETY: ICD-10-CM

## 2021-12-20 DIAGNOSIS — R00.2 PALPITATIONS: Primary | ICD-10-CM

## 2021-12-20 DIAGNOSIS — I07.1 TRACE TRICUSPID VALVE REGURGITATION: ICD-10-CM

## 2021-12-20 PROCEDURE — 3008F BODY MASS INDEX DOCD: CPT | Performed by: INTERNAL MEDICINE

## 2021-12-20 PROCEDURE — 99214 OFFICE O/P EST MOD 30 MIN: CPT | Performed by: INTERNAL MEDICINE

## 2022-01-05 ENCOUNTER — IMMUNIZATIONS (OUTPATIENT)
Dept: FAMILY MEDICINE CLINIC | Facility: HOSPITAL | Age: 67
End: 2022-01-05

## 2022-01-05 DIAGNOSIS — Z23 ENCOUNTER FOR IMMUNIZATION: Primary | ICD-10-CM

## 2022-01-05 PROCEDURE — 0064A COVID-19 MODERNA VACC 0.25 ML BOOSTER: CPT

## 2022-01-05 PROCEDURE — 91306 COVID-19 MODERNA VACC 0.25 ML BOOSTER: CPT

## 2022-01-19 DIAGNOSIS — F51.01 PRIMARY INSOMNIA: ICD-10-CM

## 2022-01-19 RX ORDER — ZOLPIDEM TARTRATE 5 MG/1
TABLET ORAL
Qty: 14 TABLET | Refills: 0 | Status: SHIPPED | OUTPATIENT
Start: 2022-01-19 | End: 2022-04-06 | Stop reason: SDUPTHER

## 2022-03-17 ENCOUNTER — OFFICE VISIT (OUTPATIENT)
Dept: INTERNAL MEDICINE CLINIC | Facility: CLINIC | Age: 67
End: 2022-03-17
Payer: COMMERCIAL

## 2022-03-17 ENCOUNTER — HOSPITAL ENCOUNTER (OUTPATIENT)
Dept: RADIOLOGY | Facility: HOSPITAL | Age: 67
Discharge: HOME | End: 2022-03-17
Attending: INTERNAL MEDICINE
Payer: COMMERCIAL

## 2022-03-17 ENCOUNTER — OFFICE VISIT (OUTPATIENT)
Dept: OBGYN CLINIC | Facility: CLINIC | Age: 67
End: 2022-03-17
Payer: COMMERCIAL

## 2022-03-17 VITALS
SYSTOLIC BLOOD PRESSURE: 110 MMHG | WEIGHT: 119 LBS | HEIGHT: 64 IN | BODY MASS INDEX: 20.32 KG/M2 | DIASTOLIC BLOOD PRESSURE: 70 MMHG

## 2022-03-17 VITALS
WEIGHT: 117.4 LBS | HEIGHT: 64 IN | OXYGEN SATURATION: 99 % | TEMPERATURE: 97.1 F | BODY MASS INDEX: 20.04 KG/M2 | HEART RATE: 65 BPM

## 2022-03-17 DIAGNOSIS — M25.469 KNEE SWELLING: Primary | ICD-10-CM

## 2022-03-17 DIAGNOSIS — R07.89 LEFT-SIDED CHEST WALL PAIN: Primary | ICD-10-CM

## 2022-03-17 DIAGNOSIS — M25.469 KNEE SWELLING: ICD-10-CM

## 2022-03-17 PROCEDURE — 99213 OFFICE O/P EST LOW 20 MIN: CPT | Performed by: NURSE PRACTITIONER

## 2022-03-17 PROCEDURE — 73562 X-RAY EXAM OF KNEE 3: CPT

## 2022-03-17 PROCEDURE — 99213 OFFICE O/P EST LOW 20 MIN: CPT | Performed by: INTERNAL MEDICINE

## 2022-03-17 PROCEDURE — 3008F BODY MASS INDEX DOCD: CPT | Performed by: INTERNAL MEDICINE

## 2022-03-17 PROCEDURE — 1036F TOBACCO NON-USER: CPT | Performed by: NURSE PRACTITIONER

## 2022-03-17 NOTE — PROGRESS NOTES
Anila Vera 62-year-old postmenopausal female who presents with complaint of left side breast pain  States she was playing tennis last week on Monday and Tuesday  On Wednesday she started having pain all around her left breast area/chest  She took tylenol which was not effective  She states that yesterday she noticed the pain subsiding and today the pain is 98 percent better  Just feels achy  Her last annual exam was in October 2021  Last mammogram was 6/30/21 which was normal  There is no known family history of breast cancer  ROS:  As indicated in HPI  All other ROS negative  Physical Exam  Constitutional:       Appearance: Normal appearance  Genitourinary:   Breasts: Breasts are symmetrical       Right: No swelling, bleeding, inverted nipple, mass, nipple discharge, skin change, tenderness, breast implant, axillary adenopathy or supraclavicular adenopathy  Left: No swelling, bleeding, inverted nipple, mass, nipple discharge, skin change, tenderness, breast implant, axillary adenopathy or supraclavicular adenopathy  HENT:      Head: Normocephalic and atraumatic  Chest:      Chest wall: No mass, swelling or tenderness  Lymphadenopathy:      Upper Body:      Right upper body: No supraclavicular or axillary adenopathy  Left upper body: No supraclavicular or axillary adenopathy  Neurological:      Mental Status: She is alert  Skin:     General: Skin is warm  Vitals and nursing note reviewed  Brant Tomlinson was seen today for breast pain  Diagnoses and all orders for this visit:    Left-sided chest wall pain      Patient reassured  Given that her symptoms have been gradually improving over the course of the last 2 days, I explained that her pain appeared to be due to the repetitive activity from playing tennis last week and over working her chest wall muscles   If this occurs again, I recommended she take Ibuprofen instead of Tylenol as it works better for inflammation and apply ice to the area  She was reminded to schedule her screening mammogram which is due in June  She verbalized agreement  Follow-up with primary GYN provider

## 2022-03-17 NOTE — PROGRESS NOTES
Assessment/Plan:    Knee swelling  This patient presents today for evaluation of right knee medial swelling  She has had no trauma nor any discomfort in the joint  Examination does confirm the presence of soft tissue swelling on the medial aspect of the right knee the knee itself does not seem to be unstable on examination  Request for a x-ray of the knee was provided to the patient for further evaluation       Diagnoses and all orders for this visit:    Knee swelling  -     Cancel: XR knee 3 vw right non injury; Future  -     XR knee 3 vw right non injury; Future        Subjective:      Patient ID: Oneal Leavitt is a 61 y o  female  This 62-year-old female patient presents today for an acute visit for evaluation of right knee swelling  She indicates that she notice that there was swelling on the medial aspect of her right knee over the past several days  She denies any recent trauma or falls  She actually does not seem to have any particular discomfort in the knee at this time  She denies any popping or grinding in the joint as she walks or goes up and down stairs  The following portions of the patient's history were reviewed and updated as appropriate:   She  has a past medical history of Allergic contact dermatitis, Allergic to insect bites and stings, Conjunctivitis, Herpes simplex infection, Laryngitis, Pharyngitis, and Pneumonia  She   Patient Active Problem List    Diagnosis Date Noted    Knee swelling 2022    Trace tricuspid valve regurgitation 2021    Nonrheumatic pulmonary valve insufficiency 2021    Palpitations 2021    Primary insomnia 2021    Anxiety 2021    Healthcare maintenance 2021    Osteopenia of multiple sites 2020    Hyperlipidemia 2019     She  has a past surgical history that includes  section and Mouth surgery  Her family history includes Atrial fibrillation in her family;  Cancer in her family and paternal grandmother; Glaucoma in her mother; Heart disease in her family, maternal grandmother, and mother; Hypertension in her family and mother; Lung cancer in her maternal aunt; Melanoma in her brother; No Known Problems in her brother, daughter, maternal grandfather, paternal aunt, paternal aunt, paternal aunt, paternal aunt, paternal aunt, paternal grandfather, sister, and son; Parkinsonism in her family and father  She  reports that she has never smoked  She has never used smokeless tobacco  She reports current alcohol use  She reports that she does not use drugs  Current Outpatient Medications   Medication Sig Dispense Refill    Cholecalciferol 1000 units capsule Take 2,000 Units by mouth daily      mometasone (ELOCON) 0 1 % cream       multivitamin (THERAGRAN) TABS Take 1 tablet by mouth daily      Omega-3 Fatty Acids (FISH OIL PO) Take 2 g by mouth      VITAMIN C, CALCIUM ASCORBATE, PO Take by mouth      zolpidem (AMBIEN) 5 mg tablet Take 1/2 to 1 tablet at bedtime as needed for insomnia 14 tablet 0     No current facility-administered medications for this visit       Review of Systems   Musculoskeletal:        Right knee medial swelling   All other systems reviewed and are negative  Objective:      Pulse 65   Temp (!) 97 1 °F (36 2 °C)   Ht 5' 4" (1 626 m)   Wt 53 3 kg (117 lb 6 4 oz)   SpO2 99%   BMI 20 15 kg/m²          Physical Exam  Musculoskeletal:         General: Swelling present  Comments: Swelling of the medial aspect of the right knee is noted on examination  There is no increased temperature emanating from the joint nor any evidence of medial or lateral laxity of the joint  I do not detect any posterior swelling or lateral swelling in the joint

## 2022-03-17 NOTE — ASSESSMENT & PLAN NOTE
This patient presents today for evaluation of right knee medial swelling  She has had no trauma nor any discomfort in the joint  Examination does confirm the presence of soft tissue swelling on the medial aspect of the right knee the knee itself does not seem to be unstable on examination    Request for a x-ray of the knee was provided to the patient for further evaluation

## 2022-04-06 ENCOUNTER — TELEPHONE (OUTPATIENT)
Dept: INTERNAL MEDICINE CLINIC | Facility: CLINIC | Age: 67
End: 2022-04-06

## 2022-04-06 NOTE — TELEPHONE ENCOUNTER
Patient called, She is not sleeping at night and needs something to help her get some rest  Can you please call in a script for something to help her at [facility] in [facility]     Patient can be reached at [phone]

## 2022-04-12 ENCOUNTER — TELEPHONE (OUTPATIENT)
Dept: OBGYN CLINIC | Facility: CLINIC | Age: 67
End: 2022-04-12

## 2022-04-12 DIAGNOSIS — N64.4 BREAST PAIN, LEFT: Primary | ICD-10-CM

## 2022-04-12 NOTE — TELEPHONE ENCOUNTER
Pt seen in office for (L) breast pain 3/17/2022 (was playing tennis) - pain had improved but now feeling again x past wk, mostly underside of breast, "burning", no lump felt, no nipple discharge    Do you want her to schedule mammogram and/or breast U/S? (would be diagnostic due to breast pain)

## 2022-04-25 ENCOUNTER — TELEPHONE (OUTPATIENT)
Dept: INTERNAL MEDICINE CLINIC | Facility: CLINIC | Age: 67
End: 2022-04-25

## 2022-04-25 ENCOUNTER — APPOINTMENT (OUTPATIENT)
Dept: LAB | Facility: CLINIC | Age: 67
End: 2022-04-25
Payer: COMMERCIAL

## 2022-04-25 DIAGNOSIS — E78.5 HYPERLIPIDEMIA, UNSPECIFIED HYPERLIPIDEMIA TYPE: ICD-10-CM

## 2022-04-25 DIAGNOSIS — R39.9 UTI SYMPTOMS: ICD-10-CM

## 2022-04-25 DIAGNOSIS — Z13.0 SCREENING FOR DEFICIENCY ANEMIA: ICD-10-CM

## 2022-04-25 DIAGNOSIS — Z13.1 SCREENING FOR DIABETES MELLITUS: ICD-10-CM

## 2022-04-25 LAB
ALBUMIN SERPL BCP-MCNC: 4.1 G/DL (ref 3.5–5)
ALP SERPL-CCNC: 66 U/L (ref 46–116)
ALT SERPL W P-5'-P-CCNC: 29 U/L (ref 12–78)
ANION GAP SERPL CALCULATED.3IONS-SCNC: 1 MMOL/L (ref 4–13)
AST SERPL W P-5'-P-CCNC: 27 U/L (ref 5–45)
BASOPHILS # BLD AUTO: 0.06 THOUSANDS/ΜL (ref 0–0.1)
BASOPHILS NFR BLD AUTO: 1 % (ref 0–1)
BILIRUB SERPL-MCNC: 0.63 MG/DL (ref 0.2–1)
BILIRUB UR QL STRIP: NEGATIVE
BUN SERPL-MCNC: 13 MG/DL (ref 5–25)
CALCIUM SERPL-MCNC: 9.8 MG/DL (ref 8.3–10.1)
CHLORIDE SERPL-SCNC: 105 MMOL/L (ref 100–108)
CHOLEST SERPL-MCNC: 188 MG/DL
CLARITY UR: CLEAR
CO2 SERPL-SCNC: 32 MMOL/L (ref 21–32)
COLOR UR: COLORLESS
CREAT SERPL-MCNC: 0.73 MG/DL (ref 0.6–1.3)
EOSINOPHIL # BLD AUTO: 0.03 THOUSAND/ΜL (ref 0–0.61)
EOSINOPHIL NFR BLD AUTO: 1 % (ref 0–6)
ERYTHROCYTE [DISTWIDTH] IN BLOOD BY AUTOMATED COUNT: 12.8 % (ref 11.6–15.1)
GFR SERPL CREATININE-BSD FRML MDRD: 87 ML/MIN/1.73SQ M
GLUCOSE P FAST SERPL-MCNC: 91 MG/DL (ref 65–99)
GLUCOSE UR STRIP-MCNC: NEGATIVE MG/DL
HCT VFR BLD AUTO: 44.6 % (ref 34.8–46.1)
HDLC SERPL-MCNC: 74 MG/DL
HGB BLD-MCNC: 14.7 G/DL (ref 11.5–15.4)
HGB UR QL STRIP.AUTO: NEGATIVE
IMM GRANULOCYTES # BLD AUTO: 0.01 THOUSAND/UL (ref 0–0.2)
IMM GRANULOCYTES NFR BLD AUTO: 0 % (ref 0–2)
KETONES UR STRIP-MCNC: NEGATIVE MG/DL
LDLC SERPL CALC-MCNC: 105 MG/DL (ref 0–100)
LEUKOCYTE ESTERASE UR QL STRIP: NEGATIVE
LYMPHOCYTES # BLD AUTO: 1.38 THOUSANDS/ΜL (ref 0.6–4.47)
LYMPHOCYTES NFR BLD AUTO: 27 % (ref 14–44)
MCH RBC QN AUTO: 30.6 PG (ref 26.8–34.3)
MCHC RBC AUTO-ENTMCNC: 33 G/DL (ref 31.4–37.4)
MCV RBC AUTO: 93 FL (ref 82–98)
MONOCYTES # BLD AUTO: 0.48 THOUSAND/ΜL (ref 0.17–1.22)
MONOCYTES NFR BLD AUTO: 9 % (ref 4–12)
NEUTROPHILS # BLD AUTO: 3.16 THOUSANDS/ΜL (ref 1.85–7.62)
NEUTS SEG NFR BLD AUTO: 62 % (ref 43–75)
NITRITE UR QL STRIP: NEGATIVE
NONHDLC SERPL-MCNC: 114 MG/DL
NRBC BLD AUTO-RTO: 0 /100 WBCS
PH UR STRIP.AUTO: 6.5 [PH]
PLATELET # BLD AUTO: 286 THOUSANDS/UL (ref 149–390)
PMV BLD AUTO: 10 FL (ref 8.9–12.7)
POTASSIUM SERPL-SCNC: 4.4 MMOL/L (ref 3.5–5.3)
PROT SERPL-MCNC: 7.5 G/DL (ref 6.4–8.2)
PROT UR STRIP-MCNC: NEGATIVE MG/DL
RBC # BLD AUTO: 4.8 MILLION/UL (ref 3.81–5.12)
SODIUM SERPL-SCNC: 138 MMOL/L (ref 136–145)
SP GR UR STRIP.AUTO: 1 (ref 1–1.03)
TRIGL SERPL-MCNC: 47 MG/DL
UROBILINOGEN UR STRIP-ACNC: <2 MG/DL
WBC # BLD AUTO: 5.12 THOUSAND/UL (ref 4.31–10.16)

## 2022-04-25 PROCEDURE — 85025 COMPLETE CBC W/AUTO DIFF WBC: CPT

## 2022-04-25 PROCEDURE — 80053 COMPREHEN METABOLIC PANEL: CPT

## 2022-04-25 PROCEDURE — 80061 LIPID PANEL: CPT

## 2022-04-25 PROCEDURE — 36415 COLL VENOUS BLD VENIPUNCTURE: CPT

## 2022-04-25 PROCEDURE — 81003 URINALYSIS AUTO W/O SCOPE: CPT

## 2022-04-25 NOTE — TELEPHONE ENCOUNTER
Please let the patient know that orders have been placed for her physical she can go to the lab at her convenience thank you

## 2022-04-25 NOTE — TELEPHONE ENCOUNTER
Pt is fasting this morning  She has her physical on wed  She wants to know if she should have any bloodwork  ?

## 2022-04-27 ENCOUNTER — OFFICE VISIT (OUTPATIENT)
Dept: INTERNAL MEDICINE CLINIC | Facility: CLINIC | Age: 67
End: 2022-04-27
Payer: COMMERCIAL

## 2022-04-27 VITALS
BODY MASS INDEX: 20.4 KG/M2 | HEIGHT: 64 IN | OXYGEN SATURATION: 98 % | DIASTOLIC BLOOD PRESSURE: 74 MMHG | TEMPERATURE: 97.8 F | WEIGHT: 119.5 LBS | SYSTOLIC BLOOD PRESSURE: 110 MMHG | HEART RATE: 70 BPM

## 2022-04-27 DIAGNOSIS — E78.5 HYPERLIPIDEMIA, UNSPECIFIED HYPERLIPIDEMIA TYPE: Primary | ICD-10-CM

## 2022-04-27 DIAGNOSIS — F51.01 PRIMARY INSOMNIA: ICD-10-CM

## 2022-04-27 DIAGNOSIS — F41.9 ANXIETY: ICD-10-CM

## 2022-04-27 DIAGNOSIS — M25.469 KNEE SWELLING: ICD-10-CM

## 2022-04-27 PROCEDURE — 99396 PREV VISIT EST AGE 40-64: CPT | Performed by: INTERNAL MEDICINE

## 2022-04-27 PROCEDURE — 3008F BODY MASS INDEX DOCD: CPT | Performed by: NURSE PRACTITIONER

## 2022-04-27 RX ORDER — AMOXICILLIN 500 MG/1
CAPSULE ORAL
COMMUNITY
Start: 2022-03-22

## 2022-04-27 RX ORDER — ALPRAZOLAM 0.25 MG/1
0.25 TABLET ORAL
Qty: 30 TABLET | Refills: 0 | Status: SHIPPED | OUTPATIENT
Start: 2022-04-27

## 2022-04-27 NOTE — PROGRESS NOTES
Assessment/Plan:    Hyperlipidemia  I reviewed with the patient today her lipid profile does show a mild increase in her LDL value over last year current value was 105 reviewed with the patient dietary adjustments to make to address this mild elevation also recommend maintaining healthy weight where it is at this present time and maintaining regular exercise routine  A follow-up  lipid profile will be obtained in 1 year    Anxiety  Increased anxiety symptoms regarding current of family situations patient has a difficult time sleeping of will use alprazolam 0 25 mg at bedtime to help her relax  Zolpidem has been used previously 2 5 mg to 5 mg HS but she admits to feeling drowsy the next day  New prescription for alprazolam was provided after a review of the [employer] controlled substance web site indicates no abnormal activity  Primary insomnia  Primary insomnia secondary to anxiety recommend alprazolam 0 25 mg HS    Knee swelling  Right knee swelling reviewed with the patient swelling is medial in location x-rays performed previously were reviewed with the patient during today's visit they confirm medial compartment arthritic changes which are mild at this time no intervention necessary       Diagnoses and all orders for this visit:    Anxiety  -     ALPRAZolam (XANAX) 0 25 mg tablet; Take 1 tablet (0 25 mg total) by mouth daily at bedtime as needed for anxiety    Other orders  -     amoxicillin (AMOXIL) 500 mg capsule; TAKE 2 CAPSULES BY MOUTH IMMEDIATELT AND THEN TAKE 1 CAPSULE BY MOUTH 4 TIMES A DAY FOR 7 DAYS        Subjective:      Patient ID: Lobo Grover is a 61 y o  female  This is an annual physical examination review of blood work for this 72-year-old female patient  She has been under increased levels of stress recently her elderly mother has been infected with COVID and has had a complicated in slow recovery course    In addition she has other family concerns that are stressful at the present time   She has had difficulty sleeping many nights  And when she does not have a good night of rest she feels worse the next day  Patient herself has had no chest pain palpitations shortness of breath nor any recent signs or symptoms of infection  The following portions of the patient's history were reviewed and updated as appropriate:   She  has a past medical history of Allergic contact dermatitis, Allergic to insect bites and stings, Conjunctivitis, Herpes simplex infection, Laryngitis, Pharyngitis, and Pneumonia  She   Patient Active Problem List    Diagnosis Date Noted    Knee swelling 2022    Trace tricuspid valve regurgitation 2021    Nonrheumatic pulmonary valve insufficiency 2021    Palpitations 2021    Primary insomnia 2021    Anxiety 2021    Healthcare maintenance 2021    Osteopenia of multiple sites 2020    Hyperlipidemia 2019     She  has a past surgical history that includes  section and Mouth surgery  Her family history includes Atrial fibrillation in her family; Cancer in her family and paternal grandmother; Glaucoma in her mother; Heart disease in her family, maternal grandmother, and mother; Hypertension in her family and mother; Lung cancer in her maternal aunt; Melanoma in her brother; No Known Problems in her brother, daughter, maternal grandfather, paternal aunt, paternal aunt, paternal aunt, paternal aunt, paternal aunt, paternal grandfather, sister, and son; Parkinsonism in her family and father  She  reports that she has never smoked  She has never used smokeless tobacco  She reports current alcohol use  She reports that she does not use drugs    Current Outpatient Medications   Medication Sig Dispense Refill    amoxicillin (AMOXIL) 500 mg capsule TAKE 2 CAPSULES BY MOUTH IMMEDIATELT AND THEN TAKE 1 CAPSULE BY MOUTH 4 TIMES A DAY FOR 7 DAYS      Cholecalciferol 1000 units capsule Take 2,000 Units by mouth daily      mometasone (ELOCON) 0 1 % cream       multivitamin (THERAGRAN) TABS Take 1 tablet by mouth daily      Omega-3 Fatty Acids (FISH OIL PO) Take 2 g by mouth      VITAMIN C, CALCIUM ASCORBATE, PO Take by mouth      ALPRAZolam (XANAX) 0 25 mg tablet Take 1 tablet (0 25 mg total) by mouth daily at bedtime as needed for anxiety 30 tablet 0     No current facility-administered medications for this visit       Review of Systems   Psychiatric/Behavioral: The patient is nervous/anxious  All other systems reviewed and are negative  Objective:      /74   Pulse 70   Temp 97 8 °F (36 6 °C)   Ht 5' 4 25" (1 632 m)   Wt 54 2 kg (119 lb 8 oz)   SpO2 98%   BMI 20 35 kg/m²          Physical Exam  Vitals reviewed  Constitutional:       General: She is not in acute distress  Appearance: Normal appearance  She is well-developed  She is not ill-appearing  HENT:      Head: Normocephalic  Right Ear: Hearing, tympanic membrane, ear canal and external ear normal       Left Ear: Hearing, tympanic membrane, ear canal and external ear normal       Nose: Nose normal  No mucosal edema  Mouth/Throat:      Mouth: Mucous membranes are moist       Pharynx: Oropharynx is clear  Uvula midline  Eyes:      General: Lids are normal  No scleral icterus  Right eye: No discharge  Left eye: No discharge  Extraocular Movements: Extraocular movements intact  Conjunctiva/sclera: Conjunctivae normal       Pupils: Pupils are equal, round, and reactive to light  Neck:      Thyroid: No thyromegaly  Vascular: No carotid bruit or JVD  Cardiovascular:      Rate and Rhythm: Normal rate and regular rhythm  Heart sounds: Normal heart sounds  No murmur heard  Pulmonary:      Effort: Pulmonary effort is normal  No respiratory distress  Breath sounds: Normal breath sounds  No stridor  No wheezing or rales     Abdominal:      General: Bowel sounds are normal  There is no distension  Palpations: Abdomen is soft  There is no mass  Tenderness: There is no abdominal tenderness  There is no guarding  Musculoskeletal:         General: No swelling or tenderness  Normal range of motion  Cervical back: Normal range of motion and neck supple  No rigidity or tenderness  Right lower leg: No edema  Left lower leg: No edema  Lymphadenopathy:      Cervical: No cervical adenopathy  Skin:     General: Skin is warm and dry  Coloration: Skin is not jaundiced or pale  Neurological:      Mental Status: She is alert and oriented to person, place, and time  Mental status is at baseline  Deep Tendon Reflexes: Reflexes are normal and symmetric  Reflexes normal    Psychiatric:         Mood and Affect: Mood normal          Speech: Speech normal          Behavior: Behavior normal  Behavior is cooperative  Thought Content:  Thought content normal          Judgment: Judgment normal

## 2022-04-27 NOTE — ASSESSMENT & PLAN NOTE
Right knee swelling reviewed with the patient swelling is medial in location x-rays performed previously were reviewed with the patient during today's visit they confirm medial compartment arthritic changes which are mild at this time no intervention necessary

## 2022-04-27 NOTE — ASSESSMENT & PLAN NOTE
Increased anxiety symptoms regarding current of family situations patient has a difficult time sleeping of will use alprazolam 0 25 mg at bedtime to help her relax  Zolpidem has been used previously 2 5 mg to 5 mg HS but she admits to feeling drowsy the next day  New prescription for alprazolam was provided after a [place] of [place] state controlled substance web [place] indicates no abnormal activity

## 2022-04-27 NOTE — ASSESSMENT & PLAN NOTE
I reviewed with the patient today her lipid profile does show a mild increase in her LDL value over last year current value was 105 reviewed with the patient dietary adjustments to make to address this mild elevation also recommend maintaining healthy weight where it is at this present time and maintaining regular exercise routine    A follow-up  lipid profile will be obtained in 1 year

## 2022-07-06 ENCOUNTER — HOSPITAL ENCOUNTER (OUTPATIENT)
Dept: RADIOLOGY | Age: 67
Discharge: HOME | End: 2022-07-06
Payer: COMMERCIAL

## 2022-07-06 VITALS — HEIGHT: 64 IN | BODY MASS INDEX: 19.63 KG/M2 | WEIGHT: 115 LBS

## 2022-07-06 DIAGNOSIS — Z12.31 ENCOUNTER FOR SCREENING MAMMOGRAM FOR BREAST CANCER: ICD-10-CM

## 2022-07-06 PROCEDURE — 77067 SCR MAMMO BI INCL CAD: CPT

## 2022-07-06 PROCEDURE — 77063 BREAST TOMOSYNTHESIS BI: CPT

## 2022-10-12 ENCOUNTER — ANNUAL EXAM (OUTPATIENT)
Dept: OBGYN CLINIC | Facility: CLINIC | Age: 67
End: 2022-10-12
Payer: COMMERCIAL

## 2022-10-12 VITALS
BODY MASS INDEX: 20.49 KG/M2 | SYSTOLIC BLOOD PRESSURE: 100 MMHG | WEIGHT: 120 LBS | HEIGHT: 64 IN | DIASTOLIC BLOOD PRESSURE: 64 MMHG

## 2022-10-12 DIAGNOSIS — Z01.419 ENCOUNTER FOR ANNUAL ROUTINE GYNECOLOGICAL EXAMINATION: Primary | ICD-10-CM

## 2022-10-12 DIAGNOSIS — F41.9 ANXIETY: ICD-10-CM

## 2022-10-12 DIAGNOSIS — Z12.31 ENCOUNTER FOR SCREENING MAMMOGRAM FOR BREAST CANCER: ICD-10-CM

## 2022-10-12 DIAGNOSIS — M85.80 OSTEOPENIA, UNSPECIFIED LOCATION: ICD-10-CM

## 2022-10-12 PROBLEM — Z00.00 HEALTHCARE MAINTENANCE: Status: RESOLVED | Noted: 2021-03-30 | Resolved: 2022-10-12

## 2022-10-12 PROCEDURE — 99396 PREV VISIT EST AGE 40-64: CPT | Performed by: OBSTETRICS & GYNECOLOGY

## 2022-10-12 RX ORDER — ALPRAZOLAM 0.25 MG/1
0.25 TABLET ORAL
Qty: 30 TABLET | Refills: 0 | Status: SHIPPED | OUTPATIENT
Start: 2022-10-12

## 2022-10-12 RX ORDER — LOTEPREDNOL ETABONATE 5 MG/ML
SUSPENSION/ DROPS OPHTHALMIC
COMMUNITY
Start: 2022-10-03

## 2022-10-12 NOTE — PROGRESS NOTES
Assessment/Plan:  Pap smear done as well as annual   Encouraged self-breast examination as well as calcium supplementation  Continue annual mammogram   Reviewed colon cancer screening, up-to-date with colonoscopy  Recommend DEXA scan follow-up osteopenia  Discussed if progressed osteoporosis, recommend bisphosphonates  Risks and benefits reviewed  All questions answered  She will continue to follow-up with primary care as scheduled  Return to office in 1 year or p r n  No problem-specific Assessment & Plan notes found for this encounter  Diagnoses and all orders for this visit:    Encounter for annual routine gynecological examination    Encounter for screening mammogram for breast cancer  -     Mammo screening bilateral w 3d & cad; Future    Osteopenia, unspecified location  -     DXA bone density spine hip and pelvis; Future    Other orders  -     loteprednol etabonate (LOTEMAX) 0 5 % ophthalmic suspension; INSTILL 1 DROP IN BOTH EYES EVERY 12 HOURS          Subjective:      Patient ID: Dina Lyn is a 61 y o  female  HPI     This is a very pleasant 73-year-old female  ( x1,  x1) presents for gyn exam   She went through menopause at age 54  She has never been on hormone replacement therapy  She denies any vaginal bleeding or spotting  No changes in bowel or bladder function  She has been in a monogamous relationship with her  for over 42 years  Pap smears have been normal   Last colonoscopy  with follow-up in 5 years  DEXA scan  revealing osteopenia  She does follow up with her family physician on a regular basis  She has been using Xanax low-dose as a sleeping aid  Patient has been retired for several years and is now spending more time with her grandchildren      The following portions of the patient's history were reviewed and updated as appropriate: allergies, current medications, past family history, past medical history, past social history, past surgical history and problem list     Review of Systems   Constitutional: Negative for fatigue, fever and unexpected weight change  Respiratory: Negative for cough, chest tightness, shortness of breath and wheezing  Cardiovascular: Negative  Negative for chest pain and palpitations  Gastrointestinal: Negative  Negative for abdominal distention, abdominal pain, blood in stool, constipation, diarrhea, nausea and vomiting  Genitourinary: Negative  Negative for difficulty urinating, dyspareunia, dysuria, flank pain, frequency, genital sores, hematuria, pelvic pain, urgency, vaginal bleeding, vaginal discharge and vaginal pain  Skin: Negative for rash  Objective:      /64   Ht 5' 4 25" (1 632 m)   Wt 54 4 kg (120 lb)   BMI 20 44 kg/m²          Physical Exam  Constitutional:       Appearance: Normal appearance  She is well-developed  Cardiovascular:      Rate and Rhythm: Normal rate and regular rhythm  Pulmonary:      Effort: Pulmonary effort is normal       Breath sounds: Normal breath sounds  Chest:   Breasts:      Right: No inverted nipple, mass, nipple discharge, skin change or tenderness  Left: No inverted nipple, mass, nipple discharge, skin change or tenderness  Abdominal:      General: Bowel sounds are normal  There is no distension  Palpations: Abdomen is soft  Tenderness: There is no abdominal tenderness  There is no guarding or rebound  Genitourinary:     Labia:         Right: No rash, tenderness or lesion  Left: No rash, tenderness or lesion  Vagina: Normal  No signs of injury  No vaginal discharge or tenderness  Cervix: No cervical motion tenderness, discharge, friability, lesion, erythema or cervical bleeding  Uterus: Not enlarged and not tender  Adnexa:         Right: No mass, tenderness or fullness  Left: No mass, tenderness or fullness       Neurological:      Mental Status: She is alert and oriented to person, place, and time  Psychiatric:         Behavior: Behavior normal        external genitalia is within normal limits  The vagina is evident of estrogen deficiency  There is no pelvic floor prolapse

## 2022-10-17 LAB
CLINICAL INFO: NORMAL
CYTOLOGY CMNT CVX/VAG CYTO-IMP: NORMAL
DATE PREVIOUS BX: NORMAL
HPV E6+E7 MRNA CVX QL NAA+PROBE: NOT DETECTED
LMP START DATE: NORMAL
SL AMB PREV. PAP:: NORMAL
SPECIMEN SOURCE CVX/VAG CYTO: NORMAL
STAT OF ADQ CVX/VAG CYTO-IMP: NORMAL

## 2023-01-03 ENCOUNTER — HOSPITAL ENCOUNTER (EMERGENCY)
Facility: HOSPITAL | Age: 68
Discharge: HOME | End: 2023-01-03
Attending: EMERGENCY MEDICINE

## 2023-01-03 ENCOUNTER — APPOINTMENT (EMERGENCY)
Dept: RADIOLOGY | Facility: HOSPITAL | Age: 68
End: 2023-01-03

## 2023-01-03 VITALS
HEART RATE: 56 BPM | DIASTOLIC BLOOD PRESSURE: 77 MMHG | TEMPERATURE: 97.9 F | RESPIRATION RATE: 16 BRPM | OXYGEN SATURATION: 99 % | SYSTOLIC BLOOD PRESSURE: 142 MMHG

## 2023-01-03 DIAGNOSIS — S63.502A WRIST SPRAIN, LEFT, INITIAL ENCOUNTER: Primary | ICD-10-CM

## 2023-01-03 DIAGNOSIS — S63.502A SPRAIN OF LEFT WRIST, INITIAL ENCOUNTER: ICD-10-CM

## 2023-01-03 RX ORDER — KETOROLAC TROMETHAMINE 30 MG/ML
15 INJECTION, SOLUTION INTRAMUSCULAR; INTRAVENOUS ONCE
Status: COMPLETED | OUTPATIENT
Start: 2023-01-03 | End: 2023-01-03

## 2023-01-03 RX ADMIN — KETOROLAC TROMETHAMINE 15 MG: 30 INJECTION, SOLUTION INTRAMUSCULAR; INTRAVENOUS at 11:22

## 2023-01-03 NOTE — ED PROVIDER NOTES
History  Chief Complaint   Patient presents with   • Wrist Injury     Pt states falling yesterday while playing pickleball, put out both of her wrists to break her fall  Swelling noted to left wrist     Diaz Hernández is a right handed 75 yo with no sig  PMH presents to the ED today with left wrist injury  Occurred yesterday while playing pickleball, fell onto both hands  Didn't hurt much initially  But now hurts with movement up and down  Putting ice on it  Took 2 Advil PM last night, didn't help  No hx of bleeding disorders  No hx of broken bones  Did not hit head  No loc  No numbness/weakness  No other acute injuries  Prior to Admission Medications   Prescriptions Last Dose Informant Patient Reported? Taking?    ALPRAZolam (XANAX) 0 25 mg tablet   No No   Sig: Take 1 tablet (0 25 mg total) by mouth daily at bedtime as needed for anxiety   Cholecalciferol 1000 units capsule   Yes No   Sig: Take 2,000 Units by mouth daily   Omega-3 Fatty Acids (FISH OIL PO)   Yes No   Sig: Take 2 g by mouth   VITAMIN C, CALCIUM ASCORBATE, PO   Yes No   Sig: Take by mouth   amoxicillin (AMOXIL) 500 mg capsule   Yes No   Sig: TAKE 2 CAPSULES BY MOUTH IMMEDIATELT AND THEN TAKE 1 CAPSULE BY MOUTH 4 TIMES A DAY FOR 7 DAYS   loteprednol etabonate (LOTEMAX) 0 5 % ophthalmic suspension   Yes No   Sig: INSTILL 1 DROP IN BOTH EYES EVERY 12 HOURS   mometasone (ELOCON) 0 1 % cream   Yes No   multivitamin (THERAGRAN) TABS   Yes No   Sig: Take 1 tablet by mouth daily      Facility-Administered Medications: None       Past Medical History:   Diagnosis Date   • Allergic contact dermatitis    • Allergic to insect bites and stings    • Conjunctivitis    • Herpes simplex infection     last assessed: 2013   • Laryngitis    • Pharyngitis    • Pneumonia        Past Surgical History:   Procedure Laterality Date   •  SECTION     • MOUTH SURGERY         Family History   Problem Relation Age of Onset   • Glaucoma Mother    • Heart disease Mother • Hypertension Mother    • Parkinsonism Father    • No Known Problems Sister    • No Known Problems Daughter    • Heart disease Maternal Grandmother    • No Known Problems Maternal Grandfather    • Cancer Paternal Grandmother         unsure of type--in digestive tract   • No Known Problems Paternal Grandfather    • No Known Problems Brother    • Melanoma Brother    • No Known Problems Son    • Lung cancer Maternal Aunt    • No Known Problems Paternal Aunt    • No Known Problems Paternal Aunt    • No Known Problems Paternal Aunt    • No Known Problems Paternal Aunt    • No Known Problems Paternal Aunt    • Atrial fibrillation Family    • Cancer Family    • Heart disease Family    • Hypertension Family    • Parkinsonism Family      I have reviewed and agree with the history as documented  E-Cigarette/Vaping   • E-Cigarette Use Never User      E-Cigarette/Vaping Substances   • Nicotine No    • THC No    • CBD No    • Flavoring No    • Other No    • Unknown No      Social History     Tobacco Use   • Smoking status: Never   • Smokeless tobacco: Never   Vaping Use   • Vaping Use: Never used   Substance Use Topics   • Alcohol use: Yes     Comment: soc   • Drug use: No       Review of Systems   Constitutional: Negative for fever  Cardiovascular: Negative for chest pain  Gastrointestinal: Negative for vomiting  Musculoskeletal:        +left wrist injury/pain   Skin: Negative for wound  Allergic/Immunologic: Negative for immunocompromised state  Neurological: Negative for weakness and numbness  Hematological: Does not bruise/bleed easily  Psychiatric/Behavioral: Negative for behavioral problems and confusion  All other systems reviewed and are negative  Physical Exam  Physical Exam  Vitals and nursing note reviewed  Constitutional:       General: She is not in acute distress  Appearance: She is well-developed  She is not ill-appearing, toxic-appearing or diaphoretic        Comments: Reading a book   HENT:      Head: Normocephalic and atraumatic  Eyes:      Extraocular Movements: Extraocular movements intact  Conjunctiva/sclera: Conjunctivae normal    Cardiovascular:      Rate and Rhythm: Normal rate and regular rhythm  Pulmonary:      Effort: Pulmonary effort is normal  No respiratory distress  Breath sounds: Normal breath sounds  Musculoskeletal:      Left shoulder: Normal       Left elbow: Normal       Left wrist: Tenderness present  No swelling, deformity, lacerations, snuff box tenderness or crepitus  Decreased range of motion  Left hand: Normal       Cervical back: Normal range of motion and neck supple  Comments: Can make okay sign  Decreased dorsiflexion and plantarflexion to full extent  No numbness  No weakness  Can adduct and abduct fingers  Skin:     General: Skin is warm and dry  Neurological:      Mental Status: She is alert and oriented to person, place, and time  Cranial Nerves: No cranial nerve deficit  Psychiatric:         Behavior: Behavior normal          Vital Signs  ED Triage Vitals [01/03/23 0928]   Temperature Pulse Respirations Blood Pressure SpO2   97 9 °F (36 6 °C) 56 16 142/77 99 %      Temp Source Heart Rate Source Patient Position - Orthostatic VS BP Location FiO2 (%)   Oral Monitor Sitting Right arm --      Pain Score       6           Vitals:    01/03/23 0928   BP: 142/77   Pulse: 56   Patient Position - Orthostatic VS: Sitting         Visual Acuity      ED Medications  Medications   ketorolac (TORADOL) injection 15 mg (15 mg Intramuscular Given 1/3/23 1122)       Diagnostic Studies  Results Reviewed     None                 XR wrist 3+ views LEFT   ED Interpretation by Eppie Koyanagi, PA-C (01/03 1119)   No sig  Fx/dislocations            Procedures  Procedures     Left volar splint placed by ed nurse  ED Course  ED Course as of 01/03/23 1152   e Jan 03, 2023   1119 Reviewed results with patient  Has newton Son splint and follow up with ortho givne pain             SBIRT 20yo+    Flowsheet Row Most Recent Value   SBIRT (25 yo +)    In order to provide better care to our patients, we are screening all of our patients for alcohol and drug use  Would it be okay to ask you these screening questions? No Filed at: 01/03/2023 1004   EMMANUEL: How many times in the past year have you    Used an illegal drug or used a prescription medication for non-medical reasons? Never Filed at: 01/03/2023 1004                    MDM    Disposition  Final diagnoses:   Wrist sprain, left, initial encounter   Sprain of left wrist, initial encounter     Time reflects when diagnosis was documented in both MDM as applicable and the Disposition within this note     Time User Action Codes Description Comment    1/3/2023 11:13 AM Bushra Dose Add [S63 502A] Wrist sprain, left, initial encounter     1/3/2023 11:14 AM Bushra Dose Add [S63 502A] Sprain of left wrist, initial encounter       ED Disposition     ED Disposition   Discharge    Condition   Stable    Date/Time   Tue Ruy 3, 2023 11:16 AM    Flaco Lopez discharge to home/self care                 Follow-up Information     Follow up With Specialties Details Why Contact Info Additional [id number] [facility] Orthopedic Surgery Schedule an appointment as soon as possible for a visit in 3 days  [place] [phone]  [phone] [address] [place] [address], [address] [place], [place], [address]  Use Entrance [facility] Emergency Department Emergency Medicine  If symptoms worsen [address] [phone]  [address] Emergency Department, [address], [place], [address]          Discharge Medication List as of 1/3/2023 11:16 AM      CONTINUE these medications which have NOT CHANGED    Details   ALPRAZolam (XANAX) 0 25 mg tablet Take 1 tablet (0 25 mg total) by mouth daily at bedtime as needed for anxiety, Starting Wed 10/12/2022, Normal      amoxicillin (AMOXIL) 500 mg capsule TAKE 2 CAPSULES BY MOUTH IMMEDIATELT AND THEN TAKE 1 CAPSULE BY MOUTH 4 TIMES A DAY FOR 7 DAYS, Historical Med      Cholecalciferol 1000 units capsule Take 2,000 Units by mouth daily, Historical Med      loteprednol etabonate (LOTEMAX) 0 5 % ophthalmic suspension INSTILL 1 DROP IN BOTH EYES EVERY 12 HOURS, Historical Med      mometasone (ELOCON) 0 1 % cream Starting Tue 6/22/2021, Historical Med      multivitamin (THERAGRAN) TABS Take 1 tablet by mouth daily, Historical Med      Omega-3 Fatty Acids (FISH OIL PO) Take 2 g by mouth, Historical Med      VITAMIN C, CALCIUM ASCORBATE, PO Take by mouth, Historical Med             No discharge procedures on file      PDMP Review       Value Time User    PDMP Reviewed  Yes 10/12/2022  4:31 PM Kyler Courtney MD          ED Provider  Electronically Signed by           Anusha Live PA-C  01/03/23 3507

## 2023-01-04 ENCOUNTER — HOSPITAL ENCOUNTER (OUTPATIENT)
Dept: BONE DENSITY | Facility: MEDICAL CENTER | Age: 68
Discharge: HOME | End: 2023-01-04

## 2023-01-04 DIAGNOSIS — M85.80 OSTEOPENIA, UNSPECIFIED LOCATION: ICD-10-CM

## 2023-01-06 ENCOUNTER — OFFICE VISIT (OUTPATIENT)
Dept: OBGYN CLINIC | Facility: CLINIC | Age: 68
End: 2023-01-06

## 2023-01-06 VITALS
WEIGHT: 120 LBS | HEART RATE: 60 BPM | HEIGHT: 64 IN | SYSTOLIC BLOOD PRESSURE: 114 MMHG | DIASTOLIC BLOOD PRESSURE: 74 MMHG | BODY MASS INDEX: 20.49 KG/M2

## 2023-01-06 DIAGNOSIS — S63.502A SPRAIN OF LEFT WRIST, INITIAL ENCOUNTER: Primary | ICD-10-CM

## 2023-01-06 NOTE — PROGRESS NOTES
Patient Name:  Romy Pierre  MRN:  [id number]    Assessment & Plan     Left wrist sprain after mechanical fall 1/2/2023  1  Since initial onset pain has improved significantly  2  Continue universal wrist brace for comfort  3  Referral to Occupational Therapy  4  Continue over-the-counter anti-inflammatories  5  Gradually return to activity as tolerated  6  Follow-up in 6 weeks with primary care sports medicine  Chief Complaint     Left wrist injury    History of the Present Illness     Romy Pierre is a 74 y o  right-hand-dominant female who reports to the office today for evaluation of her left wrist   Patient was playing [employer] ball on 1/2/2023 when she fell onto her left wrist   She denies hitting her head or loss of consciousness  After the fall she noted worsening left wrist pain  She reported to the emergency department where x-rays were performed  She was placed in a wrist splint at that time  Initially she noted 10/10 pain with all use and movement  Since the initial injury she notes significant improvement  Currently her pain is 1/10 worse with increased use  She denies any significant swelling or stiffness  No weakness or instability  She has been utilizing an over-the-counter universal wrist brace for comfort with improvement  She has also been taking anti-inflammatories as well  No numbness or tingling  No fevers or chills  Physical Exam     /74   Pulse 60   Ht 5' 4 2" (1 631 m)   Wt 54 4 kg (120 lb)   BMI 20 47 kg/m²     Left wrist: No gross deformity  Skin intact  No erythema ecchymosis or swelling  No tenderness about the distal radius, distal ulna, scaphoid and remainder of the carpus  There is tenderness to palpation about the wrist joint  Full wrist range of motion with discomfort at terminal flexion and extension  Full composite fist formation  Negative DRUJ shuck test   Sensation intact median ulnar and radial nerves  2+ radial pulse      Eyes: Anicteric sclerae  ENT: Trachea midline  Lungs: Normal respiratory effort  CV: Capillary refill is less than 2 seconds  Skin: Intact without erythema  Lymph: No palpable lymphadenopathy  Neuro: Sensation is grossly intact to light touch  Psych: Mood and affect are appropriate  Data Review     I have personally reviewed pertinent films in PACS, and my interpretation follows:    X-rays left wrist 1/3/2023: No acute osseous abnormality  No fracture or dislocation  No significant degenerative changes  Past Medical History:   Diagnosis Date   • Allergic contact dermatitis    • Allergic to insect bites and stings    • Conjunctivitis    • Herpes simplex infection     last assessed: 2013   • Laryngitis    • Pharyngitis    • Pneumonia        Past Surgical History:   Procedure Laterality Date   •  SECTION     • MOUTH SURGERY         Allergies   Allergen Reactions   • Trazodone Hallucinations       Current Outpatient Medications on File Prior to Visit   Medication Sig Dispense Refill   • ALPRAZolam (XANAX) 0 25 mg tablet Take 1 tablet (0 25 mg total) by mouth daily at bedtime as needed for anxiety 30 tablet 0   • amoxicillin (AMOXIL) 500 mg capsule TAKE 2 CAPSULES BY MOUTH IMMEDIATELT AND THEN TAKE 1 CAPSULE BY MOUTH 4 TIMES A DAY FOR 7 DAYS     • Cholecalciferol 1000 units capsule Take 2,000 Units by mouth daily     • loteprednol etabonate (LOTEMAX) 0 5 % ophthalmic suspension INSTILL 1 DROP IN BOTH EYES EVERY 12 HOURS     • mometasone (ELOCON) 0 1 % cream      • multivitamin (THERAGRAN) TABS Take 1 tablet by mouth daily     • Omega-3 Fatty Acids (FISH OIL PO) Take 2 g by mouth     • VITAMIN C, CALCIUM ASCORBATE, PO Take by mouth       No current facility-administered medications on file prior to visit         Social History     Tobacco Use   • Smoking status: Never   • Smokeless tobacco: Never   Vaping Use   • Vaping Use: Never used   Substance Use Topics   • Alcohol use: Yes     Comment: soc   • Drug use: No       Family History   Problem Relation Age of Onset   • Glaucoma Mother    • Heart disease Mother    • Hypertension Mother    • Parkinsonism Father    • No Known Problems Sister    • No Known Problems Daughter    • Heart disease Maternal Grandmother    • No Known Problems Maternal Grandfather    • Cancer Paternal Grandmother         unsure of type--in digestive tract   • No Known Problems Paternal Grandfather    • No Known Problems Brother    • Melanoma Brother    • No Known Problems Son    • Lung cancer Maternal Aunt    • No Known Problems Paternal Aunt    • No Known Problems Paternal Aunt    • No Known Problems Paternal Aunt    • No Known Problems Paternal Aunt    • No Known Problems Paternal Aunt    • Atrial fibrillation Family    • Cancer Family    • Heart disease Family    • Hypertension Family    • Parkinsonism Family        Review of Systems     As stated in the HPI  All other systems reviewed and are negative

## 2023-01-16 ENCOUNTER — TELEPHONE (OUTPATIENT)
Dept: OBGYN CLINIC | Facility: CLINIC | Age: 68
End: 2023-01-16

## 2023-01-16 NOTE — TELEPHONE ENCOUNTER
Pt informed of Dexa scan results c/w osteopenia  Pt takes calcium & vit D supp - recom 1500 mg Calcium/day, does walking & weight lifting  recom repeat Dexa scan in 2 yrs

## 2023-01-16 NOTE — TELEPHONE ENCOUNTER
----- Message from Jaime Acuna DO sent at 1/16/2023 12:38 PM EST -----  Please call the patient inform DEXA reveals osteopenia  Recommend calcium with vitamin D and repeat DEXA scan 2 years at same facility for comparison accurately

## 2023-01-23 ENCOUNTER — EVALUATION (OUTPATIENT)
Dept: OCCUPATIONAL THERAPY | Age: 68
End: 2023-01-23

## 2023-01-23 DIAGNOSIS — S63.502A SPRAIN OF LEFT WRIST, INITIAL ENCOUNTER: ICD-10-CM

## 2023-01-23 NOTE — PROGRESS NOTES
OT Evaluation     Today's date: 2023  Patient name: Brook Mishra  : 1955  MRN: [id number]  Referring provider: Sho Echevarria*  Dx:   Encounter Diagnosis     ICD-10-CM    1  Sprain of left wrist, initial encounter  S63 502A Ambulatory Referral to Occupational Therapy                     Assessment  Assessment details: Pt is a 75 y/o female presenting with a L wrist strain 2/2 falling playing pickle ball  Pt reports vast decreases in pain since her injury, and use of brace only when experiencing discomfort  She felt mild strain on the wrist with resisted wrist ext and ulnar dev, and slight tightness in AROM flx/ext  Client does not require skilled services at this time, given HEP to aide with progress at home  Symptom irritability: lowBarriers to therapy: N/A  Understanding of Dx/Px/POC: excellentPrognosis details: Client has already felt vast improvements since her injury regarding pain with motion  Use of HEP to continue to make AROM and strength gains  Goals  HEP only     Plan  Plan details: Pt was given a HEP to work on strengthening and stretching for all planes of the wrist (ulnar dev, rad dev, WE, WF)  Pt should cont with the HEP and reach out if any questions arise or pain worsens with activity  Patient would benefit from: OT eval  Planned therapy interventions: home exercise program  Treatment plan discussed with: patient        Subjective Evaluation    History of Present Illness  Date of onset: 2023  Mechanism of injury: Pt sustained a L wrist sprain 2/2 a fall playing pickle ball on 2023  She is currently using OTC wrist brace for comfort with activity and using OTC anti inflammatories  There are no reports of numbness/tingling       Pain  Current pain ratin  At worst pain ratin    Hand dominance: right          Objective     Active Range of Motion     Left Elbow   Forearm supination: 85 degrees   Forearm pronation: 90 degrees     Right Elbow   Forearm supination: 90 degrees   Forearm pronation: 90 degrees     Left Wrist   Wrist flexion: 50 degrees with pain  Wrist extension: 55 degrees   Radial deviation: 25 degrees   Ulnar deviation: 30 degrees     Right Wrist   Wrist flexion: 55 degrees   Wrist extension: 65 degrees   Radial deviation: 30 degrees   Ulnar deviation: 35 degrees     Strength/Myotome Testing     Left Elbow   Forearm supination: 5  Forearm pronation: 5    Left Wrist/Hand   Wrist extension: 4+  Wrist flexion: 4+  Radial deviation: 5  Ulnar deviation: 4+     (2nd hand position)     Trial 1: 35 6    Thumb Strength  Key/Lateral Pinch     Trial 1: 12  Palmar/Three-Point Pinch     Trial 1: 8    Right Wrist/Hand      (2nd hand position)     Trial 1: 45 5    Thumb Strength   Key/Lateral Pinch     Trial 1: 10  Palmar/Three-Point Pinch     Trial 1: 10    Additional Strength Details  Pain with wrist ext resistance, a little with ulnar deviation             Precautions: Universal  HEP: Wrist PRE, Grasp PRE w/ GTP      Manuals                                                                 Neuro Re-Ed                                                                                                        Ther Ex                                                                                                                     Ther Activity                                       Gait Training                                       Modalities

## 2023-01-24 DIAGNOSIS — F41.9 ANXIETY: ICD-10-CM

## 2023-01-25 RX ORDER — ALPRAZOLAM 0.25 MG/1
0.25 TABLET ORAL
Qty: 30 TABLET | Refills: 0 | Status: SHIPPED | OUTPATIENT
Start: 2023-01-25

## 2023-01-27 ENCOUNTER — TELEPHONE (OUTPATIENT)
Dept: OBGYN CLINIC | Facility: OTHER | Age: 68
End: 2023-01-27

## 2023-01-27 NOTE — TELEPHONE ENCOUNTER
Left message for patient to call back regarding 02/20 appointment with Dr Armida Kaplan      Provider will not be in the office in the AM  Advised to call back to reschedule in the PM or different date

## 2023-05-01 ENCOUNTER — OFFICE VISIT (OUTPATIENT)
Dept: INTERNAL MEDICINE CLINIC | Facility: CLINIC | Age: 68
End: 2023-05-01

## 2023-05-01 VITALS
BODY MASS INDEX: 21.1 KG/M2 | TEMPERATURE: 97.9 F | HEIGHT: 64 IN | OXYGEN SATURATION: 100 % | DIASTOLIC BLOOD PRESSURE: 68 MMHG | HEART RATE: 65 BPM | WEIGHT: 123.6 LBS | SYSTOLIC BLOOD PRESSURE: 120 MMHG

## 2023-05-01 DIAGNOSIS — I37.1 NONRHEUMATIC PULMONARY VALVE INSUFFICIENCY: ICD-10-CM

## 2023-05-01 DIAGNOSIS — Z13.1 SCREENING FOR DIABETES MELLITUS: ICD-10-CM

## 2023-05-01 DIAGNOSIS — Z12.11 SCREENING FOR COLON CANCER: ICD-10-CM

## 2023-05-01 DIAGNOSIS — M85.89 OSTEOPENIA OF MULTIPLE SITES: ICD-10-CM

## 2023-05-01 DIAGNOSIS — E78.5 HYPERLIPIDEMIA, UNSPECIFIED HYPERLIPIDEMIA TYPE: ICD-10-CM

## 2023-05-01 DIAGNOSIS — Z23 NEED FOR VACCINATION: ICD-10-CM

## 2023-05-01 DIAGNOSIS — R39.9 UTI SYMPTOMS: ICD-10-CM

## 2023-05-01 DIAGNOSIS — Z23 ENCOUNTER FOR VACCINATION: ICD-10-CM

## 2023-05-01 DIAGNOSIS — F51.01 PRIMARY INSOMNIA: ICD-10-CM

## 2023-05-01 DIAGNOSIS — Z13.0 SCREENING FOR DEFICIENCY ANEMIA: ICD-10-CM

## 2023-05-01 DIAGNOSIS — I07.1 TRACE TRICUSPID VALVE REGURGITATION: Primary | ICD-10-CM

## 2023-05-01 DIAGNOSIS — F41.9 ANXIETY: ICD-10-CM

## 2023-05-01 DIAGNOSIS — Z00.00 HEALTHCARE MAINTENANCE: ICD-10-CM

## 2023-05-01 PROBLEM — M25.469 KNEE SWELLING: Status: RESOLVED | Noted: 2022-03-17 | Resolved: 2023-05-01

## 2023-05-01 RX ORDER — LORAZEPAM 0.5 MG/1
0.25 TABLET ORAL
Qty: 30 TABLET | Refills: 0 | Status: SHIPPED | OUTPATIENT
Start: 2023-05-01

## 2023-05-01 NOTE — ASSESSMENT & PLAN NOTE
Most recent DEXA scan has been reviewed and confirms the presence of osteopenia recommend continuation of weightbearing exercise along with 2000 units of vitamin D daily and 1000 mg of calcium carbonate daily

## 2023-05-01 NOTE — ASSESSMENT & PLAN NOTE
Primary insomnia issues continue patient has been using alprazolam but believes it causes a cough    We will change to lorazepam 0 5 mg 1/2 tablet at bedtime as needed to help relax the mind and induce sleep

## 2023-05-01 NOTE — ASSESSMENT & PLAN NOTE
Anxiety with secondary difficulty sleep a prescription for lorazepam was provided to the patient with proper use reviewed    Review of the [place] controlled substance website was performed today with no abnormal activity noted

## 2023-05-01 NOTE — PATIENT INSTRUCTIONS
Medicare Preventive Visit Patient Instructions  Thank you for completing your Welcome to Medicare Visit or Medicare Annual Wellness Visit today  Your next wellness visit will be due in one year (5/1/2024)  The screening/preventive services that you may require over the next 5-10 years are detailed below  Some tests may not apply to you based off risk factors and/or age  Screening tests ordered at today's visit but not completed yet may show as past due  Also, please note that scanned in results may not display below  Preventive Screenings:  Service Recommendations Previous Testing/Comments   Colorectal Cancer Screening  * Colonoscopy    * Fecal Occult Blood Test (FOBT)/Fecal Immunochemical Test (FIT)  * Fecal DNA/Cologuard Test  * Flexible Sigmoidoscopy Age: 39-70 years old   Colonoscopy: every 10 years (may be performed more frequently if at higher risk)  OR  FOBT/FIT: every 1 year  OR  Cologuard: every 3 years  OR  Sigmoidoscopy: every 5 years  Screening may be recommended earlier than age 39 if at higher risk for colorectal cancer  Also, an individualized decision between you and your healthcare provider will decide whether screening between the ages of 74-80 would be appropriate  Colonoscopy: 03/22/2021  FOBT/FIT: 03/22/2021  Cologuard: Not on file  Sigmoidoscopy: Not on file    Screening Current     Breast Cancer Screening Age: 36 years old  Frequency: every 1-2 years  Not required if history of left and right mastectomy Mammogram: 07/06/2022    Screening Current   Cervical Cancer Screening Between the ages of 21-29, pap smear recommended once every 3 years  Between the ages of 33-67, can perform pap smear with HPV co-testing every 5 years     Recommendations may differ for women with a history of total hysterectomy, cervical cancer, or abnormal pap smears in past  Pap Smear: Not on file    Screening Not Indicated   Hepatitis C Screening Once for adults born between 1945 and 1965  More frequently in patients at high risk for Hepatitis C Hep C Antibody: 12/09/2021    Screening Current   Diabetes Screening 1-2 times per year if you're at risk for diabetes or have pre-diabetes Fasting glucose: 91 mg/dL (4/25/2022)  A1C: No results in last 5 years (No results in last 5 years)      Cholesterol Screening Once every 5 years if you don't have a lipid disorder  May order more often based on risk factors  Lipid panel: 04/25/2022    Screening Not Indicated  History Lipid Disorder     Other Preventive Screenings Covered by Medicare:  1  Abdominal Aortic Aneurysm (AAA) Screening: covered once if your at risk  You're considered to be at risk if you have a family history of AAA  2  Lung Cancer Screening: covers low dose CT scan once per year if you meet all of the following conditions: (1) Age 50-69; (2) No signs or symptoms of lung cancer; (3) Current smoker or have quit smoking within the last 15 years; (4) You have a tobacco smoking history of at least 20 pack years (packs per day multiplied by number of years you smoked); (5) You get a written order from a healthcare provider  3  Glaucoma Screening: covered annually if you're considered high risk: (1) You have diabetes OR (2) Family history of glaucoma OR (3)  aged 48 and older OR (3)  American aged 72 and older  3  Osteoporosis Screening: covered every 2 years if you meet one of the following conditions: (1) You're estrogen deficient and at risk for osteoporosis based off medical history and other findings; (2) Have a vertebral abnormality; (3) On glucocorticoid therapy for more than 3 months; (4) Have primary hyperparathyroidism; (5) On osteoporosis medications and need to assess response to drug therapy  · Last bone density test (DXA Scan): 01/10/2023   5  HIV Screening: covered annually if you're between the age of 15-65  Also covered annually if you are younger than 13 and older than 72 with risk factors for HIV infection   For pregnant patients, it is covered up to 3 times per pregnancy  Immunizations:  Immunization Recommendations   Influenza Vaccine Annual influenza vaccination during flu season is recommended for all persons aged >= 6 months who do not have contraindications   Pneumococcal Vaccine   * Pneumococcal conjugate vaccine = PCV13 (Prevnar 13), PCV15 (Vaxneuvance), PCV20 (Prevnar 20)  * Pneumococcal polysaccharide vaccine = PPSV23 (Pneumovax) Adults 25-60 years old: 1-3 doses may be recommended based on certain risk factors  Adults 72 years old: 1-2 doses may be recommended based off what pneumonia vaccine you previously received   Hepatitis B Vaccine 3 dose series if at intermediate or high risk (ex: diabetes, end stage renal disease, liver disease)   Tetanus (Td) Vaccine - COST NOT COVERED BY MEDICARE PART B Following completion of primary series, a booster dose should be given every 10 years to maintain immunity against tetanus  Td may also be given as tetanus wound prophylaxis  Tdap Vaccine - COST NOT COVERED BY MEDICARE PART B Recommended at least once for all adults  For pregnant patients, recommended with each pregnancy  Shingles Vaccine (Shingrix) - COST NOT COVERED BY MEDICARE PART B  2 shot series recommended in those aged 48 and above     Health Maintenance Due:      Topic Date Due    Colorectal Cancer Screening  02/08/2023    Breast Cancer Screening: Mammogram  07/06/2023    HIV Screening  Completed    Hepatitis C Screening  Completed     Immunizations Due:      Topic Date Due    COVID-19 Vaccine (5 - Booster for Moderna series) 03/02/2022    Pneumococcal Vaccine: 65+ Years (1 - PCV) Never done     Advance Directives   What are advance directives? Advance directives are legal documents that state your wishes and plans for medical care  These plans are made ahead of time in case you lose your ability to make decisions for yourself   Advance directives can apply to any medical decision, such as the treatments you want, and if you want to donate organs  What are the types of advance directives? There are many types of advance directives, and each state has rules about how to use them  You may choose a combination of any of the following:  · Living will: This is a written record of the treatment you want  You can also choose which treatments you do not want, which to limit, and which to stop at a certain time  This includes surgery, medicine, IV fluid, and tube feedings  · Durable power of  for healthcare [facility]): This is a written record that states who you want to make healthcare choices for you when you are unable to make them for yourself  This person, called a proxy, is usually a family member or a friend  You may choose more than 1 proxy  · Do not resuscitate (DNR) order:  A DNR order is used in case your heart stops beating or you stop breathing  It is a request not to have certain forms of treatment, such as CPR  A DNR order may be included in other types of advance directives  · Medical directive: This covers the care that you want if you are in a coma, near death, or unable to make decisions for yourself  You can list the treatments you want for each condition  Treatment may include pain medicine, surgery, blood transfusions, dialysis, IV or tube feedings, and a ventilator (breathing machine)  · Values history: This document has questions about your views, beliefs, and how you feel and think about life  This information can help others choose the care that you would choose  Why are advance directives important? An advance directive helps you control your care  Although spoken wishes may be used, it is better to have your wishes written down  Spoken wishes can be misunderstood, or not followed  Treatments may be given even if you do not want them  An advance directive may make it easier for your family to make difficult choices about your care     Fall Prevention    Fall prevention  includes ways to make your home and other areas safer  It also includes ways you can move more carefully to prevent a fall  Health conditions that cause changes in your blood pressure, vision, or muscle strength and coordination may increase your risk for falls  Medicines may also increase your risk for falls if they make you dizzy, weak, or sleepy  Fall prevention tips:   · Stand or sit up slowly  · Use assistive devices as directed  · Wear shoes that fit well and have soles that   · Wear a personal alarm  · Stay active  · Manage your medical conditions  Home Safety Tips:  · Add items to prevent falls in the bathroom  · Keep paths clear  · Install bright lights in your home  · Keep items you use often on shelves within reach  · Paint or place reflective tape on the edges of your stairs  © Copyright [employer] 2018 Information is for End User's use only and may not be sold, redistributed or otherwise used for commercial purposes   All illustrations and images included in CareNotes® are the copyrighted property of [employer]  or [address] [place] [address] [employer]

## 2023-05-01 NOTE — ASSESSMENT & PLAN NOTE
Updated lipid profile has been requested recommend continuation of low concentrated fat diet current weight is acceptable continue regular exercise

## 2023-05-01 NOTE — PROGRESS NOTES
Assessment and Plan:     Problem List Items Addressed This Visit        Cardiovascular and Mediastinum    Trace tricuspid valve regurgitation - Primary     An updated echocardiogram to follow-up tricuspid valve regurgitation has been requested         Relevant Orders    Echo complete w/ contrast if indicated    Nonrheumatic pulmonary valve insufficiency     An updated echocardiogram has been requested to update pulmonary valve insufficiency  Musculoskeletal and Integument    Osteopenia of multiple sites     Most recent DEXA scan has been reviewed and confirms the presence of osteopenia recommend continuation of weightbearing exercise along with 2000 units of vitamin D daily and 1000 mg of calcium carbonate daily            Other    Hyperlipidemia     Updated lipid profile has been requested recommend continuation of low concentrated fat diet current weight is acceptable continue regular exercise         Relevant Orders    Lipid panel    Anxiety     Anxiety with secondary difficulty sleep a prescription for lorazepam was provided to the patient with proper use reviewed  Review of the [facility] controlled substance website was performed today with no abnormal activity noted         Relevant Medications    LORazepam (ATIVAN) 0 5 mg tablet    Primary insomnia     Primary insomnia issues continue patient has been using alprazolam but believes it causes a cough    We will change to lorazepam 0 5 mg 1/2 tablet at bedtime as needed to help relax the mind and induce sleep        Other Visit Diagnoses     Need for vaccination        Screening for diabetes mellitus        Relevant Orders    Comprehensive metabolic panel    Screening for deficiency anemia        Relevant Orders    CBC and differential    Screening for colon cancer        Relevant Orders    Occult Blood, Fecal Immunochemical    UTI symptoms        Relevant Orders    UA w Reflex to Microscopic w Reflex to Culture -Lab Collect    Encounter for vaccination              Depression Screening and Follow-up Plan: Patient was screened for depression during today's encounter  They screened negative with a PHQ-2 score of 0  Falls Plan of Care: balance, strength, and gait training instructions were provided  Preventive health issues were discussed with patient, and age appropriate screening tests were ordered as noted in patient's After Visit Summary  Personalized health advice and appropriate referrals for health education or preventive services given if needed, as noted in patient's After Visit Summary  History of Present Illness:     Patient presents for a Medicare Wellness Visit    Pleasant 72-year-old female patient presents today for annual complete physical examination  She was provided with a request for comprehensive metabolic testing to complement today's examination  In addition the patient has a request for a echocardiogram to follow-up condition of her heart valves  She remarks no recent cardiac symptoms of chest pain palpitations or shortness of breath nor she had any recent infection symptoms  She is due for a tetanus booster as she has several grandchildren and should have a current Adacel vaccine in addition it is recommended that she have her Pneumovax 20 vaccine to protect against pneumonia as she is now at age 74  Patient Care Team:  Julia Us MD as PCP - General     Review of Systems:     Review of Systems   Psychiatric/Behavioral: Positive for sleep disturbance  All other systems reviewed and are negative         Problem List:     Patient Active Problem List   Diagnosis    Hyperlipidemia    Osteopenia of multiple sites    Anxiety    Palpitations    Primary insomnia    Trace tricuspid valve regurgitation    Nonrheumatic pulmonary valve insufficiency      Past Medical and Surgical History:     Past Medical History:   Diagnosis Date    Allergic contact dermatitis     Allergic to insect bites and stings     Conjunctivitis     Herpes simplex infection     last assessed: 2013    Laryngitis     Pharyngitis     Pneumonia      Past Surgical History:   Procedure Laterality Date     SECTION      MOUTH SURGERY        Family History:     Family History   Problem Relation Age of Onset   [facility] Glaucoma Mother     Heart disease Mother    [facility] Hypertension Mother     Parkinsonism Father     No Known Problems Sister     No Known Problems Daughter     Heart disease Maternal Grandmother     No Known Problems Maternal Grandfather     Cancer Paternal Grandmother         unsure of type--in digestive tract    No Known Problems Paternal Grandfather     No Known Problems Brother     Melanoma Brother     No Known Problems Son     Lung cancer Maternal Aunt     No Known Problems Paternal Aunt     No Known Problems Paternal Aunt     No Known Problems Paternal Aunt     No Known Problems Paternal Aunt     No Known Problems Paternal Aunt     Atrial fibrillation Family     Cancer Family     Heart disease Family     Hypertension Family     Parkinsonism Family       Social History:     Social History     Socioeconomic History    Marital status: /Civil Union     Spouse name: None    Number of children: None    Years of education: None    Highest education level: None   Occupational History    None   Tobacco Use    Smoking status: Never    Smokeless tobacco: Never   Vaping Use    Vaping Use: Never used   Substance and Sexual Activity    Alcohol use: Yes     Comment: soc    Drug use: No    Sexual activity: Yes     Partners: Male     Birth control/protection: Post-menopausal   Other Topics Concern    None   Social History Narrative    Daily coffee consumption - 2 cups a day     Exercising regularly      Social Determinants of Health     Financial Resource Strain: Low Risk     Difficulty of Paying Living Expenses: Not hard at all   Food Insecurity: Not on file   Transportation Needs: No Transportation Needs    Lack of Transportation (Medical): No    Lack of Transportation (Non-Medical): No   Physical Activity: Not on file   Stress: Not on file   Social Connections: Not on file   Intimate Partner Violence: Not on file   Housing Stability: Not on file      Medications and Allergies:     Current Outpatient Medications   Medication Sig Dispense Refill    Calcium Carbonate-Vit D-Min (CALCIUM 1200 PO) Take by mouth      Cholecalciferol 1000 units capsule Take 2,000 Units by mouth daily      LORazepam (ATIVAN) 0 5 mg tablet Take 0 5 tablets (0 25 mg total) by mouth daily at bedtime 30 tablet 0    mometasone (ELOCON) 0 1 % cream       multivitamin (THERAGRAN) TABS Take 1 tablet by mouth daily      Omega-3 Fatty Acids (FISH OIL PO) Take 2 g by mouth      VITAMIN C, CALCIUM ASCORBATE, PO Take by mouth       No current facility-administered medications for this visit       No Known Allergies   Immunizations:     Immunization History   Administered Date(s) Administered    COVID-19 MODERNA VACC 0 25 ML IM BOOSTER 01/05/2022    COVID-19 MODERNA VACC 0 5 ML IM 01/23/2021, 02/20/2021, 01/05/2022    Hep A, adult 07/23/2004, 01/28/2005    INFLUENZA 09/23/2013, 11/22/2014, 11/28/2015, 11/28/2015, 09/29/2016, 09/29/2016, 10/16/2017, 10/17/2018    Influenza Quadrivalent, 6-35 Months IM 09/29/2016, 10/16/2017    Influenza, recombinant, quadrivalent,injectable, preservative free 11/30/2021    Influenza, seasonal, injectable 11/15/2006, 12/05/2007, 09/23/2013    Td (adult), adsorbed 06/25/2007    Tdap 02/12/2001, 11/07/2013, 05/01/2023    Zoster Vaccine Recombinant 03/30/2021, 08/12/2021      Health Maintenance:         Topic Date Due    Colorectal Cancer Screening  02/08/2023    Breast Cancer Screening: Mammogram  07/06/2023    HIV Screening  Completed    Hepatitis C Screening  Completed         Topic Date Due    COVID-19 Vaccine (5 - Booster for Moderna series) 03/02/2022    Pneumococcal Vaccine: 65+ Years (1 - PCV) Never done      Medicare Screening Tests and Risk Assessments: Aneta Renteria is here for her Initial Wellness visit  Health Risk Assessment:   Patient rates overall health as excellent  Patient feels that their physical health rating is much better  Patient is very satisfied with their life  Eyesight was rated as same  Hearing was rated as same  Patient feels that their emotional and mental health rating is same  Patients states they are never, rarely angry  Patient states they are never, rarely unusually tired/fatigued  Pain experienced in the last 7 days has been none  Patient states that she has experienced no weight loss or gain in last 6 months  Depression Screening:   PHQ-2 Score: 0      Fall Risk Screening: In the past year, patient has experienced: history of falling in past year    Number of falls: 1  Injured during fall?: Yes    Feels unsteady when standing or walking?: No    Worried about falling?: No      Urinary Incontinence Screening:   Patient has not leaked urine accidently in the last six months  Home Safety:  Patient does not have trouble with stairs inside or outside of their home  Patient has working smoke alarms and has working carbon monoxide detector  Home safety hazards include: none  Nutrition:   Current diet is Regular  Medications:   Patient is currently taking over-the-counter supplements  OTC medications include: see medication list  Patient is able to manage medications  Activities of Daily Living (ADLs)/Instrumental Activities of Daily Living (IADLs):   Walk and transfer into and out of bed and chair?: Yes  Dress and groom yourself?: Yes    Bathe or shower yourself?: Yes    Feed yourself?  Yes  Do your laundry/housekeeping?: Yes  Manage your money, pay your bills and track your expenses?: Yes  Make your own meals?: Yes    Do your own shopping?: Yes    Previous Hospitalizations:   Any hospitalizations or ED visits within the last 12 months?: Yes "  How many hospitalizations have you had in the last year?: 1-2    Advance Care Planning:   Living will: Yes    Durable POA for healthcare: Yes    Advanced directive: Yes      PREVENTIVE SCREENINGS      Cardiovascular Screening:    General: Screening Not Indicated and History Lipid Disorder      Colorectal Cancer Screening:     General: Screening Current      Breast Cancer Screening:     General: Screening Current      Cervical Cancer Screening:    General: Screening Not Indicated      Abdominal Aortic Aneurysm (AAA) Screening:        General: Screening Not Indicated      Lung Cancer Screening:     General: Screening Not Indicated      Hepatitis C Screening:    General: Screening Current    Screening, Brief Intervention, and Referral to Treatment (SBIRT)    Screening    Typical number of drinks in a week: 7    Single Item Drug Screening:  How often have you used an illegal drug (including marijuana) or a prescription medication for non-medical reasons in the past year? never    Single Item Drug Screen Score: 0  Interpretation: Negative screen for possible drug use disorder    No results found  Physical Exam:     /68   Pulse 65   Temp 97 9 °F (36 6 °C)   Ht 5' 4\" (1 626 m)   Wt 56 1 kg (123 lb 9 6 oz)   SpO2 100%   BMI 21 22 kg/m²     Physical Exam  Vitals and nursing note reviewed  Constitutional:       General: She is not in acute distress  Appearance: She is well-developed  She is not ill-appearing  HENT:      Head: Normocephalic and atraumatic  Right Ear: Tympanic membrane, ear canal and external ear normal       Left Ear: Tympanic membrane, ear canal and external ear normal       Nose: Nose normal       Mouth/Throat:      Mouth: Mucous membranes are moist       Pharynx: Oropharynx is clear  Eyes:      General:         Right eye: No discharge  Left eye: No discharge  Extraocular Movements: Extraocular movements intact        Conjunctiva/sclera: Conjunctivae normal       " Pupils: Pupils are equal, round, and reactive to light  Neck:      Vascular: No carotid bruit  Cardiovascular:      Rate and Rhythm: Normal rate and regular rhythm  Heart sounds: No murmur heard  Pulmonary:      Effort: Pulmonary effort is normal  No respiratory distress  Breath sounds: Normal breath sounds  No wheezing, rhonchi or rales  Abdominal:      General: Abdomen is flat  Bowel sounds are normal  There is no distension  Palpations: Abdomen is soft  There is no mass  Tenderness: There is no abdominal tenderness  There is no guarding  Musculoskeletal:         General: No swelling or tenderness  Cervical back: Normal range of motion and neck supple  No rigidity or tenderness  Right lower leg: No edema  Left lower leg: No edema  Lymphadenopathy:      Cervical: No cervical adenopathy  Skin:     General: Skin is warm and dry  Capillary Refill: Capillary refill takes less than 2 seconds  Coloration: Skin is not jaundiced or pale  Neurological:      Mental Status: She is alert  Mental status is at baseline  Psychiatric:         Mood and Affect: Mood normal          Thought Content:  Thought content normal          Judgment: Judgment normal           Kelton Bai MD

## 2023-05-12 ENCOUNTER — APPOINTMENT (OUTPATIENT)
Dept: LAB | Facility: CLINIC | Age: 68
End: 2023-05-12

## 2023-05-12 ENCOUNTER — TELEPHONE (OUTPATIENT)
Dept: INTERNAL MEDICINE CLINIC | Facility: CLINIC | Age: 68
End: 2023-05-12

## 2023-05-12 LAB
ALBUMIN SERPL BCP-MCNC: 4 G/DL (ref 3.5–5)
ALP SERPL-CCNC: 62 U/L (ref 46–116)
ALT SERPL W P-5'-P-CCNC: 24 U/L (ref 12–78)
ANION GAP SERPL CALCULATED.3IONS-SCNC: 2 MMOL/L (ref 4–13)
AST SERPL W P-5'-P-CCNC: 29 U/L (ref 5–45)
BACTERIA UR QL AUTO: ABNORMAL /HPF
BASOPHILS # BLD AUTO: 0.07 THOUSANDS/ÂΜL (ref 0–0.1)
BASOPHILS NFR BLD AUTO: 2 % (ref 0–1)
BILIRUB SERPL-MCNC: 0.65 MG/DL (ref 0.2–1)
BILIRUB UR QL STRIP: NEGATIVE
BUN SERPL-MCNC: 17 MG/DL (ref 5–25)
CALCIUM SERPL-MCNC: 9.4 MG/DL (ref 8.3–10.1)
CHLORIDE SERPL-SCNC: 106 MMOL/L (ref 96–108)
CHOLEST SERPL-MCNC: 192 MG/DL
CLARITY UR: CLEAR
CO2 SERPL-SCNC: 30 MMOL/L (ref 21–32)
COLOR UR: ABNORMAL
CREAT SERPL-MCNC: 0.71 MG/DL (ref 0.6–1.3)
EOSINOPHIL # BLD AUTO: 0.07 THOUSAND/ÂΜL (ref 0–0.61)
EOSINOPHIL NFR BLD AUTO: 2 % (ref 0–6)
ERYTHROCYTE [DISTWIDTH] IN BLOOD BY AUTOMATED COUNT: 12.9 % (ref 11.6–15.1)
GFR SERPL CREATININE-BSD FRML MDRD: 89 ML/MIN/1.73SQ M
GLUCOSE P FAST SERPL-MCNC: 81 MG/DL (ref 65–99)
GLUCOSE UR STRIP-MCNC: NEGATIVE MG/DL
HCT VFR BLD AUTO: 43 % (ref 34.8–46.1)
HDLC SERPL-MCNC: 81 MG/DL
HGB BLD-MCNC: 14.5 G/DL (ref 11.5–15.4)
HGB UR QL STRIP.AUTO: NEGATIVE
IMM GRANULOCYTES # BLD AUTO: 0.01 THOUSAND/UL (ref 0–0.2)
IMM GRANULOCYTES NFR BLD AUTO: 0 % (ref 0–2)
KETONES UR STRIP-MCNC: NEGATIVE MG/DL
LDLC SERPL CALC-MCNC: 104 MG/DL (ref 0–100)
LEUKOCYTE ESTERASE UR QL STRIP: ABNORMAL
LYMPHOCYTES # BLD AUTO: 1.57 THOUSANDS/ÂΜL (ref 0.6–4.47)
LYMPHOCYTES NFR BLD AUTO: 33 % (ref 14–44)
MCH RBC QN AUTO: 31.5 PG (ref 26.8–34.3)
MCHC RBC AUTO-ENTMCNC: 33.7 G/DL (ref 31.4–37.4)
MCV RBC AUTO: 93 FL (ref 82–98)
MONOCYTES # BLD AUTO: 0.47 THOUSAND/ÂΜL (ref 0.17–1.22)
MONOCYTES NFR BLD AUTO: 10 % (ref 4–12)
NEUTROPHILS # BLD AUTO: 2.63 THOUSANDS/ÂΜL (ref 1.85–7.62)
NEUTS SEG NFR BLD AUTO: 53 % (ref 43–75)
NITRITE UR QL STRIP: NEGATIVE
NON-SQ EPI CELLS URNS QL MICRO: ABNORMAL /HPF
NONHDLC SERPL-MCNC: 111 MG/DL
NRBC BLD AUTO-RTO: 0 /100 WBCS
PH UR STRIP.AUTO: 7 [PH]
PLATELET # BLD AUTO: 298 THOUSANDS/UL (ref 149–390)
PMV BLD AUTO: 10.3 FL (ref 8.9–12.7)
POTASSIUM SERPL-SCNC: 4 MMOL/L (ref 3.5–5.3)
PROT SERPL-MCNC: 7.4 G/DL (ref 6.4–8.4)
PROT UR STRIP-MCNC: NEGATIVE MG/DL
RBC # BLD AUTO: 4.61 MILLION/UL (ref 3.81–5.12)
RBC #/AREA URNS AUTO: ABNORMAL /HPF
RENAL EPI CELLS #/AREA URNS HPF: PRESENT /[HPF]
SODIUM SERPL-SCNC: 138 MMOL/L (ref 135–147)
SP GR UR STRIP.AUTO: 1.01 (ref 1–1.03)
TRIGL SERPL-MCNC: 37 MG/DL
UROBILINOGEN UR STRIP-ACNC: <2 MG/DL
WBC # BLD AUTO: 4.82 THOUSAND/UL (ref 4.31–10.16)
WBC #/AREA URNS AUTO: ABNORMAL /HPF

## 2023-05-12 NOTE — TELEPHONE ENCOUNTER
Hi, this is Bella Nagy  My birthday is December 27th, 1955  I'm a patient of Doctor Candie Santiago  I just got my blood test results on my chart and there was some red flags that concerned me and I was hoping that doctor could give me a call back  So I'm not so worried  My number is [phone]   Thank you very much and happy Mother's Day

## 2023-05-13 LAB — BACTERIA UR CULT: NORMAL

## 2023-05-18 ENCOUNTER — TELEPHONE (OUTPATIENT)
Dept: INTERNAL MEDICINE CLINIC | Facility: CLINIC | Age: 68
End: 2023-05-18

## 2023-05-18 DIAGNOSIS — M54.2 NECK PAIN: Primary | ICD-10-CM

## 2023-05-18 NOTE — TELEPHONE ENCOUNTER
Pt called requesting a script for physical therapy for her neck, states has an appointment next Thursday  Pt also requesting to discuss results of UA

## 2023-05-18 NOTE — TELEPHONE ENCOUNTER
Call returned to the patient and urinalysis reviewed and also a referral to physical therapy has been provided to the patient

## 2023-05-22 ENCOUNTER — APPOINTMENT (OUTPATIENT)
Dept: LAB | Facility: CLINIC | Age: 68
End: 2023-05-22

## 2023-05-22 LAB — HEMOCCULT STL QL IA: NEGATIVE

## 2023-05-25 ENCOUNTER — EVALUATION (OUTPATIENT)
Dept: PHYSICAL THERAPY | Facility: REHABILITATION | Age: 68
End: 2023-05-25

## 2023-05-25 DIAGNOSIS — M54.2 NECK PAIN: ICD-10-CM

## 2023-05-25 NOTE — PROGRESS NOTES
PT Evaluation     Today's date: 2023  Patient name: Jeralyn Bosworth  : 1955  MRN: [id number]  Referring provider: Marshall Meyer, *  Dx:   Encounter Diagnosis     ICD-10-CM    1  Neck pain  M54 2 Ambulatory Referral to Physical Therapy          Start Time: 1500  Stop Time: 1550  Total time in clinic (min): 50 minutes    Assessment  Assessment details: Jeralyn Bosworth is a 74 y o  female presenting to outpatient physical therapy on 23 with referral from MD for mid line CS and TS pain  Signs and symptoms consistent with neck pain with mobility deficits  MSI consistent with CS ERSL    Upon evaluation, Emiliana Raphael demonstrates impaired upper CS and upper TS mobility along with scapular postural deficits   The listed impairments and functional limitation are effecting Emiliana Raphael ability to function at prior level  They can continue to benefit from physical therapy services at this times in order to address the above discussed impairments and functional limitation in order to allow for a return to premorbid status      HEP: CTJ and TS mobility, C1-2 SNAG L    Impairments: abnormal muscle firing, abnormal muscle tone, abnormal or restricted ROM, abnormal movement, activity intolerance, impaired physical strength and pain with function  Understanding of Dx/Px/POC: good   Prognosis: good    Plan  Patient would benefit from: skilled PT  Planned modality interventions: thermotherapy: hydrocollator packs  Planned therapy interventions: joint mobilization, manual therapy, ADL training, balance, balance/weight bearing training, neuromuscular re-education, home exercise program, therapeutic exercise, therapeutic activities, strengthening, patient education, functional ROM exercises and gait training  Frequency: 2x week  Duration in weeks: 6  Plan of Care beginning date: 2023  Plan of Care expiration date: 2023  Treatment plan discussed with: patient        Subjective Evaluation    History of Present "Illness  Mechanism of injury: Dexter Goldberg is a 74 y o  female presenting to physical therapy on 23 with referral from MD for chronic neck pain that began 2 months ago of gradual onset  Pain is at the base of the neck (upper TS/lower CS region)     States that she gets a lot of cracking, has been improving over the last few months  Denies h/a or numbness into UE , denies h/o head trauma, recent head/neck/dental surgery        Pain  Current pain ratin  At worst pain ratin  Location: base of neck, L > R at times  Quality: dull ache    Patient Goals  Patient goals for therapy: decreased pain  Patient goal: decreased creptius in neck, golf without neck pain or crepitus     Short Term Goals:   1  Patient will be Independent with hep  2  Patient will improve pain with activity by 50%  3  Patient will have pain free CS AROM      Long Term Goals:   1  Patient will improve FOTO to greater then goal  2  Patient will improve pain with activity to 2/10 or less  3  Patient will continue with HEP independence to allow for decreased future reoccurrence of pain and loss in function  4  Patient will swing golf club without click in neck        Objective     Posture: flexed upper TS  Myotomes (L/R): normal UE  Dermatome: (pinprick- L/R): normal UE          Reflexes:  (L/R) C5-6: 3+ b/l   C5-6: 2+ b/l     C7: 2+ b/l       Supinator Sign:  negative              Scapular position:  Downward Rotation Syndrome: left     Depression Syndrome: left    Ligamentous Testing:  Alar Ligament: intact   Transverse Ligament: intect    Palpation: mild TTP L UT     Cervical  % of normal   Flex  75 - L UT pain   Extn   50   SB Left 50   SB Right 50   ROT Left 50 P L UT   ROT Right 75 P L UT     DNF endurance: 15\"       Segmental mobility:   OAJ=  normal   AAJ=  Limited L   Mid CS=   hyomobile throughout    CTJ= hypomobile   TS=hypomobile T3-6               Precautions: standard       Manuals             C1-2 MET - L 2 bouts            MWM " - upper TS ext 3x8                                      Neuro Re-Ed                                                                                                        Ther Ex             RET             RET + ext             Seated CTJ mob 10x            Seated TS ext 10x            1/2 kneeling TS rot 10x L            C1-2 SNAG 6x L                                      Ther Activity                                       Gait Training                                       Modalities

## 2023-05-30 ENCOUNTER — OFFICE VISIT (OUTPATIENT)
Dept: PHYSICAL THERAPY | Facility: REHABILITATION | Age: 68
End: 2023-05-30

## 2023-05-30 DIAGNOSIS — M54.2 NECK PAIN: Primary | ICD-10-CM

## 2023-05-30 NOTE — PROGRESS NOTES
"Daily Note     Today's date: 2023  Patient name: Kristopher Cadet  : 1955  MRN: [id number]  Referring provider: Emilia Disla, *  Dx:   Encounter Diagnosis     ICD-10-CM    1  Neck pain  M54 2           Start Time: 1045  Stop Time: 1130  Total time in clinic (min): 45 minutes    Subjective: Patient reports feeling worse today  Really feeling tight in her neck on L side mainly as she points to her UT region  Pain not radiating into L shoulder, stays in UT region  Exercises at home are not worsening symptoms but not nececcarily improving either  Objective: See treatment diary below  CS AROM:  Rot: 25% limited L, 50% R with pain on L UT  Flexion: 75% pain UT  Ext: 50%  Hypomobile mid CS into SG R  Tone increase CS paraspinals L, UT L, levator scap    Assessment: Tolerated treatment well  Patient reports less pain post manuals  States that she felt less pressure in neck and pain in UT  able to rotate ~ 50% to left and less pain reported also with CS flexion  We walked about prolonged CS flexion positions as she has been looking down a lot at her phone when testing and emailing  Performed RET as well today which she reported was a good stretch  NE with CS AROM after performing but I do think they can be beneficial when in prolonged positions as well as for CS mobility  Plan: Continue per plan of care        Precautions: standard       Manuals            C1-2 MET - L 2 bouts NP           MWM - upper TS ext 3x8            assessment  5'           STM LT UT, levator  20'           CS SG - R SG  G3-4           CS distraction  AB           CS distraction with CTJ PA  AB           Neuro Re-Ed             UT slides  10\"x5                                                                                         Ther Ex             RET  20x           RET + ext             Seated CTJ mob 10x            Seated TS ext 10x            1/2 kneeling TS rot 10x L            C1-2 SNAG 6x L        " Ther Activity                                       Gait Training                                       Modalities

## 2023-06-01 ENCOUNTER — APPOINTMENT (OUTPATIENT)
Dept: PHYSICAL THERAPY | Facility: REHABILITATION | Age: 68
End: 2023-06-01
Payer: MEDICARE

## 2023-06-02 ENCOUNTER — OFFICE VISIT (OUTPATIENT)
Dept: PHYSICAL THERAPY | Facility: REHABILITATION | Age: 68
End: 2023-06-02

## 2023-06-02 DIAGNOSIS — M54.2 NECK PAIN: Primary | ICD-10-CM

## 2023-06-02 NOTE — PROGRESS NOTES
"Daily Note     Today's date: 2023  Patient name: Jerrel Duverney  : 1955  MRN: [id number]  Referring provider: Shira Osuna, *  Dx:   Encounter Diagnosis     ICD-10-CM    1  Neck pain  M54 2           Start Time: 1330  Stop Time: 1420  Total time in clinic (min): 50 minutes    Subjective: Patient reports that she has felt better since last session  Did have some neck stiffness last night but better today  Objective: See treatment diary below  L sided neck pain with R rot and L rot as well as flexion  Tone increased CS paraspinals L, scalenes L, levator scap L    Assessment: Tolerated treatment well  Reports less stiffness post session  She also had less pain with b/l R and flexion as flexion improved with CTJ MWM  Compensation noted with L levator scap, will assess scap strength NV with likely compensations  Plan: Continue per plan of care        Precautions: standard       Manuals  6/          C1-2 MET - L 2 bouts NP           MWM - upper TS ext 3x8  MWM flex 2x6          assessment  5' 5'          STM LT UT, levator, scalenes  20' AB          CS SG - R SG  G3-4 G3-4          CS distraction  AB AB          Shoulder stretch with pec min, rib 1 inf glide   AB          CS distraction with CTJ PA  AB AB          Neuro Re-Ed             UT slides  10\"x5                                                                                         Ther Ex            RET  20x 20x  5x OP  5x PT OP          RET + ext             Seated CTJ mob 10x            Seated TS ext 10x            1/2 kneeling TS rot 10x L            C1-2 SNAG 6x L                                      Ther Activity                                       Gait Training                                       Modalities                                            "

## 2023-06-06 ENCOUNTER — OFFICE VISIT (OUTPATIENT)
Dept: PHYSICAL THERAPY | Facility: REHABILITATION | Age: 68
End: 2023-06-06
Payer: MEDICARE

## 2023-06-06 DIAGNOSIS — M54.2 NECK PAIN: Primary | ICD-10-CM

## 2023-06-06 PROCEDURE — 97140 MANUAL THERAPY 1/> REGIONS: CPT | Performed by: PHYSICAL THERAPIST

## 2023-06-06 NOTE — PROGRESS NOTES
"Daily Note     Today's date: 2023  Patient name: Arjun Wagoner  : 1955  MRN: [id number]  Referring provider: Joey Godfrey, *  Dx:   Encounter Diagnosis     ICD-10-CM    1  Neck pain  M54 2           Start Time: 1750  Stop Time:   Total time in clinic (min): 40 minutes    Subjective: Patient reports feeling much better with less pain and stiffness  Golf is better, less clicking in neck but is still present  Objective: See treatment diary below  Pain free CS AROM    Assessment: Tolerated treatment well  Improved CS segmental mobility from last session with improve R SG  Improve tone in scalenes and levator scap  Continued with current MT plan, worked in [employer] and TS mobility as well  Patient educated on continued HEP  Plan: Continue per plan of care        Precautions: standard       Manuals          C1-2 MET - L 2 bouts NP           MWM - upper TS ext 3x8  MWM flex 2x6 2x10         assessment  5' 5' 5'         STM LT UT, levator, scalenes  20' AB          CS SG - R SG  G3-4 G3-4 G3-4         CS distraction  AB AB AB         Seated reverse NAG    AB         Shoulder stretch with pec min, rib 1 inf glide   AB NP         CS distraction with CTJ PA  AB AB AB         Neuro Re-Ed             UT slides  10\"x5                                                                                         Ther Ex           RET  20x 20x  5x OP  5x PT OP HEP         RET + ext             Seated CTJ mob 10x            Seated TS ext 10x            1/2 kneeling TS rot 10x L            C1-2 SNAG 6x L                                      Ther Activity                                       Gait Training                                       Modalities                                            "

## 2023-06-13 ENCOUNTER — OFFICE VISIT (OUTPATIENT)
Dept: PHYSICAL THERAPY | Facility: REHABILITATION | Age: 68
End: 2023-06-13
Payer: MEDICARE

## 2023-06-13 DIAGNOSIS — M54.2 NECK PAIN: Primary | ICD-10-CM

## 2023-06-13 PROCEDURE — 97112 NEUROMUSCULAR REEDUCATION: CPT | Performed by: PHYSICAL THERAPIST

## 2023-06-13 PROCEDURE — 97140 MANUAL THERAPY 1/> REGIONS: CPT | Performed by: PHYSICAL THERAPIST

## 2023-06-13 NOTE — PROGRESS NOTES
"Daily Note     Today's date: 2023  Patient name: Breanna Nugent  : 1955  MRN: [id number]  Referring provider: Cheng Nicolas, *  Dx:   Encounter Diagnosis     ICD-10-CM    1  Neck pain  M54 2           Start Time: 1550  Stop Time: 1630  Total time in clinic (min): 40 minutes    Subjective: Patient reports that she feels much better with pain and stiffness, still noticing cracking that is painless with returning from L rotation as well as stiffness at times when needing to look over her L shoulder as when driving  Objective: See treatment diary below  Hypomobile C1-2 L rot  Hypomobile CTJ  Hypermobile mid CS  DNF strength: fair +  Weakness with L rot vs R  Improved paraspinal and scalene tone    Assessment: Tolerated treatment well  Updated HEP with seated R TS rotation while keeping CS neutral  Also updated with supine DNF for improving intrinsics and neck control in mid CS in hopes for improving clicking she feels with rotation  Plan: Continue per plan of care        Precautions: standard       Manuals         C1-2 MET - L 2 bouts NP   2 rds - 3 bouts ea        MWM - upper TS ext 3x8  MWM flex 2x6 2x10 L rot  2x8        assessment  5' 5' 5' 5'        STM LT UT, levator, scalenes  20' AB          CS SG - R SG  G3-4 G3-4 G3-4         CS distraction  AB AB AB         Seated reverse NAG    AB         Shoulder stretch with pec min, rib 1 inf glide   AB NP         CS distraction with CTJ PA  AB AB AB AB        Neuro Re-Ed             UT slides  10\"x5   NV        Supine DNF     10\"x5 with not, 5\"x10 with lift        Isometric rotation     5x ea side 5\" hold        Seated TS R rot with CS fixed neutral     2x10, eye movement into L rot                                               Ther Ex          RET  20x 20x  5x OP  5x PT OP HEP         RET + ext             Seated CTJ mob 10x            Seated TS ext 10x            1/2 kneeling TS rot 10x L          " C1-2 SNAG 6x L                                      Ther Activity                                       Gait Training                                       Modalities

## 2023-06-15 ENCOUNTER — OFFICE VISIT (OUTPATIENT)
Dept: PHYSICAL THERAPY | Facility: REHABILITATION | Age: 68
End: 2023-06-15
Payer: MEDICARE

## 2023-06-15 DIAGNOSIS — M54.2 NECK PAIN: Primary | ICD-10-CM

## 2023-06-15 PROCEDURE — 97140 MANUAL THERAPY 1/> REGIONS: CPT | Performed by: PHYSICAL THERAPIST

## 2023-06-15 NOTE — PROGRESS NOTES
"Daily Note     Today's date: 6/15/2023  Patient name: Prasad Cartwright  : 1955  MRN: [id number]  Referring provider: Kiran Mcleod, *  Dx:   Encounter Diagnosis     ICD-10-CM    1  Neck pain  M54 2           Start Time: 5078  Stop Time: 5534  Total time in clinic (min): 35 minutes    Subjective: Patient reports feeing good, 93% improved since starting PT  Objective: See treatment diary below      Assessment: Tolerated treatment well  Improved L levator pull post MWM CTJ with flexion  Progressing well  Plan: Continue per plan of care        Precautions: standard       Manuals 5/25 5/30 6/2 6/6 6/13 6/15       C1-2 MET - L 2 bouts NP   2 rds - 3 bouts ea 3 bouts to L       MWM - upper TS ext 3x8  MWM flex 2x6 2x10 L rot  2x8 With flexion   3x8       assessment  5' 5' 5' 5' 3'       STM LT UT, levator, scalenes  20' AB   AB       CS SG - R SG  G3-4 G3-4 G3-4  G3-4       CS distraction  AB AB AB         Seated reverse NAG    AB         Shoulder stretch with pec min, rib 1 inf glide   AB NP         CS distraction with CTJ PA  AB AB AB AB        Neuro Re-Ed             UT slides  10\"x5   NV        Supine DNF     10\"x5 with not, 5\"x10 with lift        Isometric rotation     5x ea side 5\" hold        Seated TS R rot with CS fixed neutral     2x10, eye movement into L rot                                               Ther Ex          RET  20x 20x  5x OP  5x PT OP HEP         RET + ext             Seated CTJ mob 10x            Seated TS ext 10x            1/2 kneeling TS rot 10x L            C1-2 SNAG 6x L                                      Ther Activity                                       Gait Training                                       Modalities                                            "

## 2023-06-21 ENCOUNTER — OFFICE VISIT (OUTPATIENT)
Dept: PHYSICAL THERAPY | Facility: REHABILITATION | Age: 68
End: 2023-06-21
Payer: MEDICARE

## 2023-06-21 DIAGNOSIS — M54.2 NECK PAIN: Primary | ICD-10-CM

## 2023-06-21 PROCEDURE — 97140 MANUAL THERAPY 1/> REGIONS: CPT | Performed by: PHYSICAL THERAPIST

## 2023-06-21 PROCEDURE — 97112 NEUROMUSCULAR REEDUCATION: CPT | Performed by: PHYSICAL THERAPIST

## 2023-06-21 NOTE — PROGRESS NOTES
"Daily Note     Today's date: 2023  Patient name: Blossom Valdez  : 1955  MRN: [id number]  Referring provider: Josr Tanner, *  Dx:   Encounter Diagnosis     ICD-10-CM    1  Neck pain  M54 2           Start Time: 1430  Stop Time: 1515  Total time in clinic (min): 45 minutes    Subjective: Patient reports that she does feel good overall  Better with turning neck to left when driving  Still feeling crunches  Objective: See treatment diary below      Assessment: Tolerated treatment well  Updated HEP with UT wall slides      Plan: Continue per plan of care        Precautions: standard       Manuals 5/25 5/30 6/2 6/6 6/13 6/15 6/21      C1-2 MET - L 2 bouts NP   2 rds - 3 bouts ea 3 bouts to L       MWM - upper TS ext 3x8  MWM flex 2x6 2x10 L rot  2x8 With flexion   3x8       assessment  5' 5' 5' 5' 3'       STM LT UT, levator, scalenes  20' AB   AB       CS SG - R SG  G3-4 G3-4 G3-4  G3-4       CS distraction  AB AB AB         Seated reverse NAG    AB         Shoulder stretch with pec min, rib 1 inf glide   AB NP         CS distraction with CTJ PA  AB AB AB AB        Neuro Re-Ed             UT slides  10\"x5   NV        Supine DNF     10\"x5 with not, 5\"x10 with lift        Isometric rotation     5x ea side 5\" hold        Seated TS R rot with CS fixed neutral     2x10, eye movement into L rot                                               Ther Ex          RET  20x 20x  5x OP  5x PT OP HEP         RET + ext             Seated CTJ mob 10x            Seated TS ext 10x            1/2 kneeling TS rot 10x L            C1-2 SNAG 6x L                                      Ther Activity                                       Gait Training                                       Modalities                                            "

## 2023-06-27 ENCOUNTER — HOSPITAL ENCOUNTER (OUTPATIENT)
Dept: NON INVASIVE DIAGNOSTICS | Facility: CLINIC | Age: 68
Discharge: HOME | End: 2023-06-27
Payer: MEDICARE

## 2023-06-27 ENCOUNTER — APPOINTMENT (OUTPATIENT)
Dept: PHYSICAL THERAPY | Facility: REHABILITATION | Age: 68
End: 2023-06-27
Payer: MEDICARE

## 2023-06-27 VITALS
HEIGHT: 64 IN | SYSTOLIC BLOOD PRESSURE: 120 MMHG | WEIGHT: 123 LBS | DIASTOLIC BLOOD PRESSURE: 68 MMHG | BODY MASS INDEX: 21 KG/M2 | HEART RATE: 50 BPM

## 2023-06-27 DIAGNOSIS — I07.1 TRACE TRICUSPID VALVE REGURGITATION: ICD-10-CM

## 2023-06-27 LAB
AORTIC ROOT: 2.3 CM
APICAL FOUR CHAMBER EJECTION FRACTION: 59 %
E WAVE DECELERATION TIME: 166 MS
FRACTIONAL SHORTENING: 45 (ref 28–44)
INTERVENTRICULAR SEPTUM IN DIASTOLE (PARASTERNAL SHORT AXIS VIEW): 0.9 CM
INTERVENTRICULAR SEPTUM: 0.9 CM (ref 0.6–1.1)
LAAS-AP2: 14.1 CM2
LAAS-AP4: 15.8 CM2
LEFT ATRIUM AREA SYSTOLE SINGLE PLANE A4C: 16.5 CM2
LEFT ATRIUM SIZE: 3.2 CM
LEFT ATRIUM VOLUME (MOD BIPLANE): 38 ML
LEFT INTERNAL DIMENSION IN SYSTOLE: 2.1 CM (ref 2.1–4)
LEFT VENTRICULAR INTERNAL DIMENSION IN DIASTOLE: 3.8 CM (ref 3.5–6)
LEFT VENTRICULAR POSTERIOR WALL IN END DIASTOLE: 0.9 CM
LEFT VENTRICULAR STROKE VOLUME: 48 ML
LVSV (TEICH): 48 ML
MV E'TISSUE VEL-SEP: 10 CM/S
MV PEAK A VEL: 0.67 M/S
MV PEAK E VEL: 94 CM/S
MV STENOSIS PRESSURE HALF TIME: 48 MS
MV VALVE AREA P 1/2 METHOD: 4.58
RA PRESSURE ESTIMATED: 3 MMHG
RIGHT ATRIUM AREA SYSTOLE A4C: 12.8 CM2
RV PSP: 21 MMHG
SL CV LEFT ATRIUM LENGTH A2C: 4.7 CM
SL CV LV EF: 65
SL CV PED ECHO LEFT VENTRICLE DIASTOLIC VOLUME (MOD BIPLANE) 2D: 62 ML
SL CV PED ECHO LEFT VENTRICLE SYSTOLIC VOLUME (MOD BIPLANE) 2D: 14 ML
TR MAX PG: 18 MMHG
TR PEAK VELOCITY: 2.1 M/S
TRICUSPID ANNULAR PLANE SYSTOLIC EXCURSION: 2.3 CM

## 2023-06-27 PROCEDURE — 93306 TTE W/DOPPLER COMPLETE: CPT | Performed by: INTERNAL MEDICINE

## 2023-06-27 PROCEDURE — 93306 TTE W/DOPPLER COMPLETE: CPT

## 2023-07-10 ENCOUNTER — TELEPHONE (OUTPATIENT)
Dept: INTERNAL MEDICINE CLINIC | Facility: CLINIC | Age: 68
End: 2023-07-10

## 2023-07-10 NOTE — TELEPHONE ENCOUNTER
Pt is calling for the results of her echo. She is aware you are out of the office and will wait to hear from you upon your return.

## 2023-07-11 ENCOUNTER — APPOINTMENT (OUTPATIENT)
Dept: PHYSICAL THERAPY | Facility: REHABILITATION | Age: 68
End: 2023-07-11
Payer: MEDICARE

## 2023-07-17 ENCOUNTER — OFFICE VISIT (OUTPATIENT)
Dept: PHYSICAL THERAPY | Facility: REHABILITATION | Age: 68
End: 2023-07-17
Payer: MEDICARE

## 2023-07-17 DIAGNOSIS — M54.2 NECK PAIN: Primary | ICD-10-CM

## 2023-07-17 PROCEDURE — 97112 NEUROMUSCULAR REEDUCATION: CPT | Performed by: PHYSICAL THERAPIST

## 2023-07-17 PROCEDURE — 97140 MANUAL THERAPY 1/> REGIONS: CPT | Performed by: PHYSICAL THERAPIST

## 2023-07-17 NOTE — PROGRESS NOTES
Daily Note     Today's date: 2023  Patient name: Andrew García  : 1955  MRN: [id number]  Referring provider: Michael Montalvo, *  Dx:   Encounter Diagnosis     ICD-10-CM    1. Neck pain  M54.2           Start Time: 1100  Stop Time: 1145  Total time in clinic (min): 45 minutes    Subjective: Patient reports overall improvement but still noticing pain with turing neck to left. Overall less stiffness and cracking with neck motion      Objective: See treatment diary below  L sided neck pain with L rot, R rot that improved post STM.   + insufficient scapular elevation, LT weakness with inf angle wing    Assessment: Tolerated treatment well. HEP updated with UT slides and LT OH pull apart. Shown how to perform self levator release with lax ball. Plan: Continue per plan of care.       Precautions: standard       Manuals 5/25 5/30 6/2 6/6 6/13 6/15 6/21 7/17     C1-2 MET - L 2 bouts NP   2 rds - 3 bouts ea 3 bouts to L       MWM - upper TS ext 3x8  MWM flex 2x6 2x10 L rot  2x8 With flexion   3x8       assessment  5' 5' 5' 5' 3'  5'     STM LT UT, levator, scalenes  20' AB   AB  AB     CS SG - R SG  G3-4 G3-4 G3-4  G3-4  AB     CS distraction  AB AB AB         Seated reverse NAG    AB         Shoulder stretch with pec min, rib 1 inf glide   AB NP         CS distraction with CTJ PA  AB AB AB AB   AB     Neuro Re-Ed             UT slides  10"x5   NV   10x     Supine DNF     10"x5 with not, 5"x10 with lift        Isometric rotation     5x ea side 5" hold        Seated TS R rot with CS fixed neutral     2x10, eye movement into L rot        OH LT pull apart        BTB  5"x5  3 rds                               Ther Ex       RET  20x 20x  5x OP  5x PT OP HEP         RET + ext             Seated CTJ mob 10x            Seated TS ext 10x            1/2 kneeling TS rot 10x L            C1-2 SNAG 6x L                                      Ther Activity Gait Training                                       Modalities

## 2023-07-26 ENCOUNTER — OFFICE VISIT (OUTPATIENT)
Dept: PHYSICAL THERAPY | Facility: REHABILITATION | Age: 68
End: 2023-07-26
Payer: MEDICARE

## 2023-07-26 DIAGNOSIS — M54.2 NECK PAIN: Primary | ICD-10-CM

## 2023-07-26 PROCEDURE — 97112 NEUROMUSCULAR REEDUCATION: CPT | Performed by: PHYSICAL THERAPIST

## 2023-07-26 PROCEDURE — 97140 MANUAL THERAPY 1/> REGIONS: CPT | Performed by: PHYSICAL THERAPIST

## 2023-07-26 NOTE — PROGRESS NOTES
Daily Note     Today's date: 2023  Patient name: Darron Turner  : 1955  MRN: [id number]  Referring provider: Brandi George, *  Dx:   Encounter Diagnosis     ICD-10-CM    1. Neck pain  M54.2           Start Time: 1415  Stop Time: 1510  Total time in clinic (min): 55 minutes    Subjective: Patient reports that she has notice increased stiffness the last 5-6 days. States that she does not think the added exercises with overhead band pull was causing it. Has not been as consistent with retraction exercises. Objective: See treatment diary below  Tone increase CS paraspinals and TS paraspinals upper TS on L.   L rot 50% pain  Flexion 75% pain    Assessment: Tolerated treatment well. Patient with improve CS L rotation post session with STM, MWM and RET+ext. She continued with pain L side neck with flexion but did feel less of a pull/pain post STM. Cues provided for HEP to improve form with OH LT pull apart as she had + shrug vs depression of scapula. Plan: Continue per plan of care.       Precautions: standard       Manuals 5/25 5/30 6/2 6/6 6/13 6/15 6/21 7/17 7/26    C1-2 MET - L 2 bouts NP   2 rds - 3 bouts ea 3 bouts to L       MWM - upper TS ext 3x8  MWM flex 2x6 2x10 L rot  2x8 With flexion   3x8   With flexion 2x6    assessment  5' 5' 5' 5' 3'  5' 5'    STM UT/ paraspinals  20' AB   AB  AB AB    CS SG - R SG  G3-4 G3-4 G3-4  G3-4  AB AB    CS distraction  AB AB AB         Seated reverse NAG    AB                      Shoulder stretch with pec min, rib 1 inf glide   AB NP         CS distraction with CTJ PA  AB AB AB AB   AB     Neuro Re-Ed            UT slides  10"x5   NV   10x     Supine DNF     10"x5 with not, 5"x10 with lift        Isometric rotation     5x ea side 5" hold        Seated TS R rot with CS fixed neutral     2x10, eye movement into L rot        OH LT pull apart        BTB  5"x5  3 rds BTB  review                              Ther Ex   7/26    RET  20x 20x  5x OP  5x PT OP HEP     10x    RET + ext         10x    Seated CTJ mob 10x            Seated TS ext 10x            1/2 kneeling TS rot 10x L            C1-2 SNAG 6x L                                      Ther Activity                                       Gait Training                                       Modalities

## 2023-08-03 ENCOUNTER — APPOINTMENT (OUTPATIENT)
Dept: PHYSICAL THERAPY | Facility: REHABILITATION | Age: 68
End: 2023-08-03
Payer: MEDICARE

## 2023-08-07 ENCOUNTER — OFFICE VISIT (OUTPATIENT)
Dept: PHYSICAL THERAPY | Facility: REHABILITATION | Age: 68
End: 2023-08-07
Payer: MEDICARE

## 2023-08-07 DIAGNOSIS — M54.2 NECK PAIN: Primary | ICD-10-CM

## 2023-08-07 PROCEDURE — 97140 MANUAL THERAPY 1/> REGIONS: CPT | Performed by: PHYSICAL THERAPIST

## 2023-08-07 PROCEDURE — 97112 NEUROMUSCULAR REEDUCATION: CPT | Performed by: PHYSICAL THERAPIST

## 2023-08-07 NOTE — PROGRESS NOTES
Daily Note     Today's date: 2023  Patient name: Bhavna Mendoza  : 1955  MRN: [id number]  Referring provider: Melissa Molina, *  Dx:   Encounter Diagnosis     ICD-10-CM    1. Neck pain  M54.2           Start Time: 1115  Stop Time: 1215  Total time in clinic (min): 60 minutes    Subjective: Patient reports that her neck had been good for roughly 1 week following PT. States that she was carrying a heavy water can when she began to feel neck stiffness/tightness. She then golfed and states that stiffness and pain persisted. Symptoms began Saturday. Objective: See treatment diary below  CS AROM:  Painful and mod limitation b/l rot - improved to min limitation post STM  TTP and tone increase L SCM, CS paraspinals, levator scap  Painful flexion with mod limitation - improve to normal ROM post but pain remains. Insufficient scap upward rotation - b/l, L < R    Assessment: Tolerated treatment well. Pt with improvements in neck pain and motion follow tx as noted above. HEP updated with  rock backs to address weakness/insufficient upward rotation      Plan: Continue per plan of care.       Precautions: standard       Manuals  6/2 6/6 6/13 6/15 6/21 7/17 7/26 8/7   C1-2 MET - L 2 bouts NP   2 rds - 3 bouts ea 3 bouts to L       MWM - upper TS ext 3x8  MWM flex 2x6 2x10 L rot  2x8 With flexion   3x8   With flexion 2x6    assessment  5' 5' 5' 5' 3'  5' 5' 5'   STM UT/ paraspinals  20' AB   AB  AB AB AB   CS SG - R SG  G3-4 G3-4 G3-4  G3-4  AB AB L - with CS traction   CS distraction  AB AB AB      AB   CS RET + ext          5x (began with 10x RET)   Seated reverse NAG    AB                      Shoulder stretch with pec min, rib 1 inf glide   AB NP         CS distraction with CTJ PA  AB AB AB AB   AB     Neuro Re-Ed            wall slides/rock backs          10x ea   UT slides  10"x5   NV   10x     Supine DNF     10"x5 with not, 5"x10 with lift        Isometric rotation     5x ea side 5" hold        Seated TS R rot with CS fixed neutral     2x10, eye movement into L rot        OH LT pull apart        BTB  5"x5  3 rds BTB  review                              Ther Ex  5/30 6/2 6/6 6/13 7/17 7/26    RET  20x 20x  5x OP  5x PT OP HEP     10x    RET + ext         10x    Seated CTJ mob 10x            Seated TS ext 10x            1/2 kneeling TS rot 10x L            C1-2 SNAG 6x L                                      Ther Activity                                       Gait Training                                       Modalities

## 2023-08-14 ENCOUNTER — OFFICE VISIT (OUTPATIENT)
Dept: PHYSICAL THERAPY | Facility: REHABILITATION | Age: 68
End: 2023-08-14
Payer: MEDICARE

## 2023-08-14 DIAGNOSIS — M54.2 NECK PAIN: Primary | ICD-10-CM

## 2023-08-14 PROCEDURE — 97112 NEUROMUSCULAR REEDUCATION: CPT | Performed by: PHYSICAL THERAPIST

## 2023-08-14 PROCEDURE — 97140 MANUAL THERAPY 1/> REGIONS: CPT | Performed by: PHYSICAL THERAPIST

## 2023-08-14 NOTE — PROGRESS NOTES
Reassessment     Today's date: 2023  Patient name: Dinesh Chavez  : 1955  MRN: [id number]  Referring provider: Terrence Renteria, *  Dx:   Encounter Diagnosis     ICD-10-CM    1. Neck pain  M54.2           Start Time: 1530  Stop Time: 1615  Total time in clinic (min): 45 minutes    Subjective: Patient reports that she has felt much better overall since starting PT. She still is getting some clicking in her neck with turning but better. Pulling and pain along L side CS also better. She would like to continue with PT as she feel she can still benefit from improved mobility and pain improvements with functional tasks such as turning over her L shoulder when driving      Objective: See treatment diary below    Short Term Goals:   1. Patient will be Independent with hep - MET  2. Patient will improve pain with activity by 50% - MET  3. Patient will have pain free CS AROM - IMPROVD      Long Term Goals:   1. Patient will improve FOTO to greater then goal - IMPROVED  2. Patient will improve pain with activity to 2/10 or less - IMPROVED  3. Patient will continue with HEP independence to allow for decreased future reoccurrence of pain and loss in function - IMPROVED  4. Patient will swing golf club without click in neck - IMPROVED      Assessment: Patient is a 74y.o. year old female who attended physical therapy for 10  treatment sessions regarding chronic L sided neck pain. Patient reports  improvement at this time which correlates to improved impairments and functionality. Patient has shown improvement throughout PT by demonstrating decreased pain, increased range of motion, increased strength and improved tolerance to activity. Patient continues to present with pain, decreased ROM, decreased strength, and decreased tolerance to activity. Dino Winchester would benefit from continued physical therapy to address these issues and to maximize function. Thank you. Plan: Continue per plan of care. Precautions: standard       Manuals 8/14 5/30 6/2 6/6 6/13 6/15 6/21 7/17 7/26 8/7   C1-2 MET - L  NP   2 rds - 3 bouts ea 3 bouts to L       MWM - upper TS ext   MWM flex 2x6 2x10 L rot  2x8 With flexion   3x8   With flexion 2x6    assessment 5' 5' 5' 5' 5' 3'  5' 5' 5'   STM UT/ paraspinals, scalenes, SCM AB 20' AB   AB  AB AB AB   CS SG - R SG L with CS traction G3-4 G3-4 G3-4  G3-4  AB AB L - with CS traction   CS distraction AB AB AB AB      AB   CS RET + ext          5x (began with 10x RET)   Seated reverse NAG    AB                      Shoulder stretch with pec min, rib 1 inf glide   AB NP         CS distraction with CTJ PA  AB AB AB AB   AB     Neuro Re-Ed        7/17 7/26 8/7   SA wall slides/rock backs          10x ea   UT slides  10"x5   NV   10x     Supine DNF     10"x5 with not, 5"x10 with lift        Isometric rotation     5x ea side 5" hold        Seated TS R rot with CS fixed neutral     2x10, eye movement into L rot        OH LT pull apart        BTB  5"x5  3 rds BTB  review                              Ther Ex  5/30 6/2 6/6 6/13 7/17 7/26    RET 10x, 10x with pt.  OP 20x 20x  5x OP  5x PT OP HEP     10x    RET + ext 2x5        10x    Seated CTJ mob             Seated TS ext             1/2 kneeling TS rot             C1-2 SNAG                                       Ther Activity                                       Gait Training                                       Modalities

## 2023-08-16 ENCOUNTER — HOSPITAL ENCOUNTER (OUTPATIENT)
Dept: RADIOLOGY | Age: 68
Discharge: HOME | End: 2023-08-16
Payer: MEDICARE

## 2023-08-16 VITALS — BODY MASS INDEX: 20.14 KG/M2 | WEIGHT: 118 LBS | HEIGHT: 64 IN

## 2023-08-16 DIAGNOSIS — Z12.31 ENCOUNTER FOR SCREENING MAMMOGRAM FOR BREAST CANCER: ICD-10-CM

## 2023-08-16 PROCEDURE — 77063 BREAST TOMOSYNTHESIS BI: CPT

## 2023-08-16 PROCEDURE — 77067 SCR MAMMO BI INCL CAD: CPT

## 2023-08-18 ENCOUNTER — TELEPHONE (OUTPATIENT)
Dept: INTERNAL MEDICINE CLINIC | Facility: CLINIC | Age: 68
End: 2023-08-18

## 2023-08-18 DIAGNOSIS — F41.9 ANXIETY: ICD-10-CM

## 2023-08-18 RX ORDER — BENZONATATE 200 MG/1
CAPSULE ORAL
COMMUNITY
Start: 2023-07-08

## 2023-08-18 RX ORDER — ALPRAZOLAM 0.25 MG/1
0.25 TABLET ORAL
Qty: 30 TABLET | Refills: 0 | Status: SHIPPED | OUTPATIENT
Start: 2023-08-18

## 2023-08-18 RX ORDER — AMOXICILLIN AND CLAVULANATE POTASSIUM 875; 125 MG/1; MG/1
TABLET, FILM COATED ORAL
COMMUNITY
Start: 2023-07-08

## 2023-08-18 NOTE — TELEPHONE ENCOUNTER
Please let the patient know that a prescription for Xanax has been sent to her [facility] in [place] thank you

## 2023-08-18 NOTE — TELEPHONE ENCOUNTER
this is Tessa Johnson calling. I would like to ask that I get a prescription for Xanax. I had it for a while for sleep when I wake up and can't fall back to sleep and Doctor Marlon Daniel tried Lorazepam gave me that but that didn't work for me. So if I could go back to the Xanax. My name is Tessa Johnson. My number, My YOB: 1955. Phone number [phone], Latrice Bennett.  Thank you

## 2023-08-30 ENCOUNTER — OFFICE VISIT (OUTPATIENT)
Dept: PHYSICAL THERAPY | Facility: REHABILITATION | Age: 68
End: 2023-08-30
Payer: MEDICARE

## 2023-08-30 DIAGNOSIS — M54.2 NECK PAIN: Primary | ICD-10-CM

## 2023-08-30 PROCEDURE — 97140 MANUAL THERAPY 1/> REGIONS: CPT | Performed by: PHYSICAL THERAPIST

## 2023-08-30 PROCEDURE — 97112 NEUROMUSCULAR REEDUCATION: CPT | Performed by: PHYSICAL THERAPIST

## 2023-08-30 NOTE — PROGRESS NOTES
Daily Note     Today's date: 2023  Patient name: Millicent Bone  : 1955  MRN: [id number]  Referring provider: Jovon Rodriguez, *  Dx:   Encounter Diagnosis     ICD-10-CM    1. Neck pain  M54.2           Start Time: 1330  Stop Time: 1415  Total time in clinic (min): 45 minutes    Subjective: Patient reports that her neck pain has been good. She is just coming off vacation with minimal activity. Still will feel stiffness at times but also better. Cracking still present with turning her head but also better overall. Objective: See treatment diary below  Improve tissue tone scalenes, CS paraspinals. Hypomobile mid CS into R SG  Decreased scapular elevation  L UT pull with L rot - improved with scapular assist    Assessment: Tolerated treatment well. Updated HEP with L UT slides with and without working in [address] [place] [address]: Continue per plan of care.       Precautions: standard       Manuals 8/14 8/30 6/2 6/6 6/13 6/15 6/21 7/17 7/26 8/7   C1-2 MET - L     2 rds - 3 bouts ea 3 bouts to L       MWM - upper TS ext   MWM flex 2x6 2x10 L rot  2x8 With flexion   3x8   With flexion 2x6    assessment 5' 5' 5' 5' 5' 3'  5' 5' 5'   STM UT/ paraspinals, scalenes, SCM AB paraspinals AB   AB  AB AB AB   CS SG - R SG L with CS traction With CS traction G3-4 G3-4  G3-4  AB AB L - with CS traction   CS distraction AB AB AB AB      AB   CS RET + ext          5x (began with 10x RET)   Seated reverse NAG    AB         RET with PT OP  AB  2x5           Shoulder stretch with pec min, rib 1 inf glide   AB NP         CS distraction with CTJ PA   AB AB AB   AB     Neuro Re-Ed           SA wall slides/rock backs          10x ea   UT slides  5"x5  5x with L rot AROM CS   NV   10x     Supine DNF     10"x5 with not, 5"x10 with lift        Isometric rotation     5x ea side 5" hold        Seated TS R rot with CS fixed neutral     2x10, eye movement into L rot        OH LT pull apart        BTB  5"x5  3 rds BTB  review                              Ther Ex   6/2 6/6 6/13 7/17 7/26    RET 10x, 10x with pt.  OP  20x  5x OP  5x PT OP HEP     10x    RET + ext 2x5        10x    Seated CTJ mob             Seated TS ext             1/2 kneeling TS rot             C1-2 SNAG                                       Ther Activity                                       Gait Training                                       Modalities

## 2023-09-08 ENCOUNTER — OFFICE VISIT (OUTPATIENT)
Dept: INTERNAL MEDICINE CLINIC | Facility: CLINIC | Age: 68
End: 2023-09-08
Payer: MEDICARE

## 2023-09-08 VITALS
SYSTOLIC BLOOD PRESSURE: 124 MMHG | HEART RATE: 61 BPM | BODY MASS INDEX: 20.9 KG/M2 | DIASTOLIC BLOOD PRESSURE: 76 MMHG | WEIGHT: 122.4 LBS | HEIGHT: 64 IN | TEMPERATURE: 98.3 F | OXYGEN SATURATION: 98 %

## 2023-09-08 DIAGNOSIS — J02.9 PHARYNGITIS, UNSPECIFIED ETIOLOGY: Primary | ICD-10-CM

## 2023-09-08 DIAGNOSIS — L30.9 ECZEMA, UNSPECIFIED TYPE: ICD-10-CM

## 2023-09-08 PROCEDURE — 99213 OFFICE O/P EST LOW 20 MIN: CPT | Performed by: INTERNAL MEDICINE

## 2023-09-08 RX ORDER — AZITHROMYCIN 250 MG/1
TABLET, FILM COATED ORAL
Qty: 6 TABLET | Refills: 0 | Status: SHIPPED | OUTPATIENT
Start: 2023-09-08 | End: 2023-09-13

## 2023-09-08 NOTE — PROGRESS NOTES
Name: Juventino Anderson      : 1955      MRN: [id number]  Encounter Provider: J Luis Wall MD  Encounter Date: 2023   Encounter department: [address] [place] [address]     1. Pharyngitis, unspecified etiology  Assessment & Plan:  Examination symptoms consistent with acute pharyngitis recommend 7-day course of Zithromax through a Zithromax pack. Normal saline gargle 2-3 times a day follow-up if symptoms do not resolve    Orders:  -     azithromycin (Zithromax) 250 mg tablet; Take 2 tablets (500 mg total) by mouth daily for 1 day, THEN 1 tablet (250 mg total) daily for 4 days. 2. Eczema, unspecified type  Assessment & Plan:  Eczema of the wrist in the area where she wears her watch. Recommend mometasone 0.1% applied daily until resolved moisturizing cream to the skin at bedtime as well           Subjective      This pleasant 71-year-old female patient presents today for evaluation of a rash in the wrist area where she wears her watch. She has a crusty thin skin patch that corresponds with a eczema type of reaction. She also has been experiencing a sore throat for several days without any associated symptoms. Review of Systems   HENT: Positive for sore throat. Skin: Positive for rash. All other systems reviewed and are negative.       Current Outpatient Medications on File Prior to Visit   Medication Sig   • ALPRAZolam (XANAX) 0.25 mg tablet Take 1 tablet (0.25 mg total) by mouth daily at bedtime as needed for anxiety   • Calcium Carbonate-Vit D-Min (CALCIUM 1200 PO) Take by mouth   • Cholecalciferol 1000 units capsule Take 2,000 Units by mouth daily   • mometasone (ELOCON) 0.1 % cream    • multivitamin (THERAGRAN) TABS Take 1 tablet by mouth daily   • Omega-3 Fatty Acids (FISH OIL PO) Take 2 g by mouth   • VITAMIN C, CALCIUM ASCORBATE, PO Take by mouth   • amoxicillin-clavulanate (AUGMENTIN) 875-125 mg per tablet TAKE 1 TABLET BY MOUTH EVERY 12 HOURS FOR 10 DAYS (Patient not taking: Reported on 9/8/2023)   • benzonatate (TESSALON) 200 MG capsule TAKE 1 CAPSULE BY MOUTH 3 TIMES A DAY FOR 7 DAYS **DONT CRUSH OR CHEW (Patient not taking: Reported on 9/8/2023)       Objective     /76   Pulse 61   Temp 98.3 °F (36.8 °C) (Tympanic)   Ht 5' 4" (1.626 m)   Wt 55.5 kg (122 lb 6.4 oz)   SpO2 98%   BMI 21.01 kg/m²     Physical Exam  Vitals reviewed. Constitutional:       General: She is not in acute distress. Appearance: Normal appearance. She is well-developed. She is not ill-appearing. HENT:      Right Ear: Hearing, tympanic membrane, ear canal and external ear normal.      Left Ear: Hearing, tympanic membrane, ear canal and external ear normal.      Nose: Nose normal. No mucosal edema. Mouth/Throat:      Pharynx: Uvula midline. Posterior oropharyngeal erythema present. Eyes:      General: Lids are normal.      Conjunctiva/sclera: Conjunctivae normal.      Pupils: Pupils are equal, round, and reactive to light. Neck:      Thyroid: No thyromegaly. Vascular: No carotid bruit or JVD. Cardiovascular:      Rate and Rhythm: Normal rate and regular rhythm. Heart sounds: Normal heart sounds. No murmur heard. Pulmonary:      Effort: Pulmonary effort is normal. No respiratory distress. Breath sounds: Normal breath sounds. No wheezing, rhonchi or rales. Abdominal:      General: Bowel sounds are normal.      Palpations: Abdomen is soft. Musculoskeletal:         General: Normal range of motion. Cervical back: Normal range of motion and neck supple. No rigidity or tenderness. Lymphadenopathy:      Cervical: No cervical adenopathy. Skin:     General: Skin is warm and dry. Comments: Eczema rash of the wrist   Neurological:      Mental Status: She is alert and oriented to person, place, and time. Deep Tendon Reflexes: Reflexes are normal and symmetric.    Psychiatric:         Speech: Speech normal.         Behavior: Behavior normal. Behavior is cooperative. Thought Content:  Thought content normal.         Judgment: Judgment normal.       Ama Soria MD

## 2023-09-08 NOTE — ASSESSMENT & PLAN NOTE
Examination symptoms consistent with acute pharyngitis recommend 7-day course of Zithromax through a Zithromax pack.   Normal saline gargle 2-3 times a day follow-up if symptoms do not resolve

## 2023-09-08 NOTE — ASSESSMENT & PLAN NOTE
Eczema of the wrist in the area where she wears her watch.   Recommend mometasone 0.1% applied daily until resolved moisturizing cream to the skin at bedtime as well

## 2023-09-09 ENCOUNTER — NURSE TRIAGE (OUTPATIENT)
Dept: INTERNAL MEDICINE CLINIC | Facility: CLINIC | Age: 68
End: 2023-09-09

## 2023-09-09 DIAGNOSIS — U07.1 COVID-19: Primary | ICD-10-CM

## 2023-09-09 RX ORDER — NIRMATRELVIR AND RITONAVIR 300-100 MG
3 KIT ORAL 2 TIMES DAILY
Qty: 30 TABLET | Refills: 0 | Status: SHIPPED | OUTPATIENT
Start: 2023-09-09 | End: 2023-09-14

## 2023-09-09 NOTE — TELEPHONE ENCOUNTER
Regarding: Covid positive - on antibiotics  ----- Message from Chang Sauer sent at 9/9/2023  1:40 PM EDT -----  P/t calling back and advised will received call back soon    ----- Message from Elmo Ramirez sent at 9/9/2023 11:41 AM EDT -----  " I saw the Dr. Enrico Suresh for a sick visit and was prescribed antibiotics. I just took a Covid test and it came back positive.  Should I continue to take the antibiotics or should I be on something else as I am getting worse."

## 2023-09-09 NOTE — TELEPHONE ENCOUNTER
Answer Assessment - Initial Assessment Questions  1. COVID-19 DIAGNOSIS: "Who made your COVID-19 diagnosis?" "Was it confirmed by a positive lab test or self-test?" If not diagnosed by a doctor (or NP/PA), ask "Are there lots of cases (community spread) where you live?" Note: See [facility] health department website, if unsure. Positive home test today     2. COVID-19 EXPOSURE: "Was there any known exposure to COVID before the symptoms began?" CDC Definition of close contact: within 6 feet (2 meters) for a total of 15 minutes or more over a 24-hour period. Unknown    3. ONSET: "When did the COVID-19 symptoms start?"       Yesterday     4. WORST SYMPTOM: "What is your worst symptom?" (e.g., cough, fever, shortness of breath, muscle aches)      Fatigue and body aches     5. COUGH: "Do you have a cough?" If Yes, ask: "How bad is the cough?"        Dry cough     6. FEVER: "Do you have a fever?" If Yes, ask: "What is your temperature, how was it measured, and when did it start?"      Had chills last night but none today     7. RESPIRATORY STATUS: "Describe your breathing?" (e.g., shortness of breath, wheezing, unable to speak)       Denies     8. BETTER-SAME-WORSE: "Are you getting better, staying the same or getting worse compared to yesterday?"  If getting worse, ask, "In what way?"      Worse     9. HIGH RISK DISEASE: "Do you have any chronic medical problems?" (e.g., asthma, heart or lung disease, weak immune system, obesity, etc.)      Denies     10. VACCINE: "Have you had the COVID-19 vaccine?" If Yes, ask: "Which one, how many shots, when did you get it?"        Yes     11. BOOSTER: "Have you received your COVID-19 booster?" If Yes, ask: "Which one and when did you get it?"        Denies     12. PREGNANCY: "Is there any chance you are pregnant?" "When was your last menstrual period?"        N/a     13.  OTHER SYMPTOMS: "Do you have any other symptoms?"  (e.g., chills, fatigue, headache, loss of smell or taste, muscle pain, sore throat)        Sore throat, decreased smell/taste, headaches     14.  O2 SATURATION MONITOR:  "Do you use an oxygen saturation monitor (pulse oximeter) at home?" If Yes, ask "What is your reading (oxygen level) today?" "What is your usual oxygen saturation reading?" (e.g., 95%)        N/a    Protocols used: CORONAVIRUS (COVID-19) DIAGNOSED OR SUSPECTED-ADULT-

## 2023-09-09 NOTE — TELEPHONE ENCOUNTER
Patient calling in due to testing positive for COVID today. Patient stated symptoms started yesterday- fatigue, body aches, dry cough, sore throat, headaches and decreased smell/taste. Patient stated she had chills last night but did not take her temperature and has not had chills today. Denies any SOB or wheezing. Stated yesterday she was started on Z-steven and was unsure if she should stop taking it at this time. Patient also interested in Paxlovid treatment. On call provider contacted, stated patient should stop Catrachita Chandler now and gave verbal order to send in Paxlovid to her pharmacy. Medication was sent into the patient's pharmacy. Patient made aware and verbalized understanding.

## 2023-09-11 ENCOUNTER — TELEPHONE (OUTPATIENT)
Dept: INTERNAL MEDICINE CLINIC | Facility: CLINIC | Age: 68
End: 2023-09-11

## 2023-09-11 ENCOUNTER — APPOINTMENT (OUTPATIENT)
Dept: PHYSICAL THERAPY | Facility: REHABILITATION | Age: 68
End: 2023-09-11
Payer: MEDICARE

## 2023-09-11 NOTE — TELEPHONE ENCOUNTER
Pt has COVID. You started her on paxlovid. She took 4 doses and it gives her a bad metal taste, a headache, loss of appetite, gi symptoms. . Actual COVID symptoms are minimal.  She wants to know if she should just stop the paxlovid?

## 2023-09-11 NOTE — TELEPHONE ENCOUNTER
Patient is feeling better overall no fever no chills. Is having a GI reaction to the Paxlovid and was told to stop those pills. We will continue to keep in touch with our office as to her condition.   Keeping again a well-hydrated and well nourished

## 2023-09-20 ENCOUNTER — OFFICE VISIT (OUTPATIENT)
Dept: PHYSICAL THERAPY | Facility: REHABILITATION | Age: 68
End: 2023-09-20
Payer: MEDICARE

## 2023-09-20 DIAGNOSIS — M54.2 NECK PAIN: Primary | ICD-10-CM

## 2023-09-20 PROCEDURE — 97110 THERAPEUTIC EXERCISES: CPT | Performed by: PHYSICAL THERAPIST

## 2023-09-20 PROCEDURE — 97140 MANUAL THERAPY 1/> REGIONS: CPT | Performed by: PHYSICAL THERAPIST

## 2023-10-16 ENCOUNTER — OFFICE VISIT (OUTPATIENT)
Dept: PHYSICAL THERAPY | Facility: REHABILITATION | Age: 68
End: 2023-10-16
Payer: MEDICARE

## 2023-10-16 DIAGNOSIS — M54.2 NECK PAIN: Primary | ICD-10-CM

## 2023-10-16 PROCEDURE — 97140 MANUAL THERAPY 1/> REGIONS: CPT | Performed by: PHYSICAL THERAPIST

## 2023-10-16 PROCEDURE — 97161 PT EVAL LOW COMPLEX 20 MIN: CPT | Performed by: PHYSICAL THERAPIST

## 2023-10-16 NOTE — PROGRESS NOTES
PT Evaluation     Today's date: 10/16/2023  Patient name: Manuel Gerber  : 1955  MRN: [id number]  Referring provider: Astrid Cronin, *  Dx:   Encounter Diagnosis     ICD-10-CM    1. Neck pain  M54.2           Start Time: 1015  Stop Time: 1100  Total time in clinic (min): 45 minutes    Assessment  Assessment details: Manuel Gerber is a 74 y.o. female presenting to outpatient physical therapy on 10/16/23 with referral from [address] [place] [address] for chronic neck pain without radiculopathy. MSIS consistent with CS hypomobility syndrome and neck pain with mobility deficits. Upon evaluation, Graham Sanchez demonstrates impaired CS and TS segmental mobility with AROM/PROM limitations and functional deficits with turning and looking. The listed impairments and functional limitation are effecting Graham Sanchez ability to function at prior level.  They can continue to benefit from physical therapy services at this times in order to address the above discussed impairments and functional limitation in order to allow for a return to premorbid status      HEP provided with: self CTJ butterfly, RET, RET and ext           Impairments: abnormal muscle firing, abnormal muscle tone, abnormal or restricted ROM, abnormal movement, activity intolerance, impaired physical strength and pain with function  Understanding of Dx/Px/POC: good   Prognosis: good    Plan  Patient would benefit from: skilled PT  Planned modality interventions: thermotherapy: hydrocollator packs  Planned therapy interventions: joint mobilization, manual therapy, ADL training, neuromuscular re-education, home exercise program, therapeutic exercise, therapeutic activities, strengthening, patient education and functional ROM exercises  Frequency: 2x week  Duration in weeks: 4  Plan of Care beginning date: 10/16/2023  Plan of Care expiration date: 11/15/2023  Treatment plan discussed with: patient      Subjective Evaluation    History of Present Illness  Mechanism of injury: Graham Sanchez is a 74 y.o. female presenting to physical therapy on 10/16/23 with referral from [facility] for     Patient symptoms are located    Patient symptoms are constant/intermittent  Patient symptoms are localized/referred  Patient denies bowel and bladder changes (denies urinary retention)/saddle numbness      Patient Goals  Patient goals for therapy: decreased pain and increased motion    Pain  Current pain ratin  At worst pain rating: 3  Location: L side CS - paraspinals, L UT      Diagnostic Tests  No diagnostic tests performed  Treatments  Previous treatment: physical therapy    Short Term Goals:   1. Patient will be Independent with hep  2. Patient will improve pain with activity by 50%  3. Patient will report GROC 50% or greater      Long Term Goals:   1. Patient will improve FOTO to greater then goal  2. Patient will improve pain with activity to 2/10 or less  3. Patient will continue with HEP independence to allow for decreased future reoccurrence of pain and loss in function  4. Patient will report GROC 75% or greater  5. Patient will report no limitation turning to look over shoulder when driving       Objective    Posture: flexed upper TS                        Scapular position:    Depression Syndrome: Left      Palpation: TTP CS paraspinals L, UT, sub occipitals     Cervical  % of normal   Flex. 76   Extn. 50   SB Left 25   SB Right 25   ROT Left 50   ROT Right 75           MMT         AROM          PROM    Shoulder       L       R        L           R      L     R   Flex. Abd. IR.         ER.                   Upper Trap         Mid Trap         Low Trap         Serratus                   Rib Assessment:  Rib 1: Elevated:  no  Hypomobile: no         Segmental mobility:   OAJ=  hypomobile b/l   AAJ= L rot hypomobile    Mid CS=   hypomobile throughout    CTJ= hypomobile   TS= hypomobile            Precautions: standard       Manuals 10/16            CS dist AB            CS dist with R SG AB G3 STM UT/ paraspinals AB            C1-2 MET L rot  2 bouts            Neuro Re-Ed                                                                                                        Ther Ex                                                                                                                     Ther Activity                                       Gait Training                                       Modalities

## 2023-10-23 ENCOUNTER — OFFICE VISIT (OUTPATIENT)
Dept: PHYSICAL THERAPY | Facility: REHABILITATION | Age: 68
End: 2023-10-23
Payer: MEDICARE

## 2023-10-23 DIAGNOSIS — M54.2 NECK PAIN: Primary | ICD-10-CM

## 2023-10-23 PROCEDURE — 97140 MANUAL THERAPY 1/> REGIONS: CPT | Performed by: PHYSICAL THERAPIST

## 2023-10-23 PROCEDURE — 97110 THERAPEUTIC EXERCISES: CPT | Performed by: PHYSICAL THERAPIST

## 2023-10-23 NOTE — PROGRESS NOTES
Daily Note     Today's date: 10/23/2023  Patient name: Lianet Collins  : 1955  MRN: [id number]  Referring provider: Annamaria Carey, *  Dx:   Encounter Diagnosis     ICD-10-CM    1. Neck pain  M54.2           Start Time: 1100  Stop Time: 1145  Total time in clinic (min): 45 minutes    Subjective: Patient reports that her exercises do help. States that pain has increased at certain times, felt even when standing. Objective: See treatment diary below      Assessment: Tolerated treatment well. Improved L rot, R rot post session as she had improved ROM to L and reported less pull going R. HEP updated with SNAGs for improving CS rotation. Plan: Continue per plan of care.       Precautions: standard       Manuals 10/16 10/23           CS dist AB AB           CS dist with R SG AB G3 L rot mob G3-4           STM UT/ paraspinals AB AB - L levator           C1-2 MET L rot  2 bouts            TS MWM ext  2x8           Neuro Re-Ed                                                                                                        Ther Ex             CS SNAG - L  2x8           HEP review  3'                                                                                         Ther Activity                                       Gait Training                                       Modalities

## 2023-10-26 ENCOUNTER — OFFICE VISIT (OUTPATIENT)
Dept: PHYSICAL THERAPY | Facility: REHABILITATION | Age: 68
End: 2023-10-26
Payer: MEDICARE

## 2023-10-26 DIAGNOSIS — M54.2 NECK PAIN: Primary | ICD-10-CM

## 2023-10-26 PROCEDURE — 97110 THERAPEUTIC EXERCISES: CPT | Performed by: PHYSICAL THERAPIST

## 2023-10-26 PROCEDURE — 97140 MANUAL THERAPY 1/> REGIONS: CPT | Performed by: PHYSICAL THERAPIST

## 2023-10-26 NOTE — PROGRESS NOTES
Daily Note     Today's date: 10/26/2023  Patient name: Rogers Castellanos  : 1955  MRN: [id number]  Referring provider: Maria De Jesus Woody, *  Dx:   Encounter Diagnosis     ICD-10-CM    1. Neck pain  M54.2           Start Time: 1345  Stop Time: 1430  Total time in clinic (min): 45 minutes    Subjective: Patient reports that she had felt great after PT. Best she felt since starting PT a week ago. Was walking and talking, turning head and pain did ensue. Objective: See treatment diary below  Hypomobile mid CS L rot, opening    Assessment: Tolerated treatment well. Patient  continues to improve with CS AROM with 50%-70% range looking L. Trial performed with CS traction mechanically since she does well with manual.       Plan: Continue per plan of care.       Precautions: standard       Manuals 10/16 10/23 10/26          CS dist AB AB AB          CS dist with R SG AB G3 L rot mob G3-4 L rot G3-4          STM UT/ paraspinals AB AB - L levator AB b/l          C1-2 MET L rot  2 bouts  2 rds          TS MWM ext  2x8           Neuro Re-Ed                                                                                                        Ther Ex             CS SNAG - L  2x8 2x6  4 reps with L rot hold actively           HEP review  3'                                                                                         Ther Activity                                       Gait Training                                       Modalities   10/26          Mechanical traction   10# 5'

## 2023-11-01 ENCOUNTER — OFFICE VISIT (OUTPATIENT)
Dept: PHYSICAL THERAPY | Facility: REHABILITATION | Age: 68
End: 2023-11-01
Payer: MEDICARE

## 2023-11-01 DIAGNOSIS — M54.2 NECK PAIN: Primary | ICD-10-CM

## 2023-11-01 PROCEDURE — 97140 MANUAL THERAPY 1/> REGIONS: CPT | Performed by: PHYSICAL THERAPIST

## 2023-11-01 NOTE — PROGRESS NOTES
Daily Note     Today's date: 2023  Patient name: Gisela Britt  : 1955  MRN: [id number]  Referring provider: Amelia Villalobos, *  Dx:   Encounter Diagnosis     ICD-10-CM    1. Neck pain  M54.2           Start Time: 1325  Stop Time: 1410  Total time in clinic (min): 45 minutes    Subjective: Patient reports that she does continues to have neck pain, mornings at times are the hardest with stiffness and pain      Objective: See treatment diary below  50% limitation L rot    Assessment: Tolerated treatment well. She reports feeling well after session. We talked about prolonged CS flexion with looking down at her computer. We also discussed continued HEP in combination with avoiding prolonged positioning. Will consider spine referral to pain mng or ortho pending progress/lack of. Plan: Continue per plan of care.       Precautions: standard       Manuals 10/16 10/23 10/26 11/1         CS dist AB AB AB AB         CS dist with R SG AB G3 L rot mob G3-4 L rot G3-4          STM UT/ paraspinals AB AB - L levator AB b/l AB L         SOR    AB         C1-2 MET L rot  2 bouts  2 rds          TS MWM ext  2x8           Neuro Re-Ed                                                                                                        Ther Ex             CS SNAG - L  2x8 2x6  4 reps with L rot hold actively           HEP review  3'  5'                                                                                       Ther Activity                                       Gait Training                                       Modalities   10/26          Mechanical traction   10# 5'

## 2023-11-06 ENCOUNTER — OFFICE VISIT (OUTPATIENT)
Dept: PHYSICAL THERAPY | Facility: REHABILITATION | Age: 68
End: 2023-11-06
Payer: MEDICARE

## 2023-11-06 DIAGNOSIS — M54.2 NECK PAIN: Primary | ICD-10-CM

## 2023-11-06 PROCEDURE — 97140 MANUAL THERAPY 1/> REGIONS: CPT | Performed by: PHYSICAL THERAPIST

## 2023-11-06 NOTE — PROGRESS NOTES
Daily Note     Today's date: 2023  Patient name: Shabana York  : 1955  MRN: [id number]  Referring provider: Keenan Kerr, *  Dx:   Encounter Diagnosis     ICD-10-CM    1. Neck pain  M54.2           Start Time: 1100  Stop Time: 1145  Total time in clinic (min): 45 minutes    Subjective: Patient reports doing well. States that she felt good after last PT session and has been incorporating in watching neck posture throughout her day. States that she also has changed her pillow to add more support to CS when asleep. Objective: See treatment diary below      Assessment: Tolerated treatment well. Patient with improved CS paraspinal tone post STM. Reproted feeing pain free flexion post session which was painful prior to MWM. Plan: Continue per plan of care.       Precautions: standard       Manuals 10/16 10/23 10/26 11/1 11/6        CS dist AB AB AB AB AB        CS dist with R SG AB G3 L rot mob G3-4 L rot G3-4  B/l rot mob G3-4        STM UT/ paraspinals/scalenes AB AB - L levator AB b/l AB L AB - 20'        SOR    AB         C1-2 MET L rot  2 bouts  2 rds          MWM CTJ flexion     2x8, rot 2x6 L        TS MWM ext  2x8           Neuro Re-Ed                                                                                                        Ther Ex             CS SNAG - L  2x8 2x6  4 reps with L rot hold actively           HEP review  3'  5'                                                                                       Ther Activity                                       Gait Training                                       Modalities   10/26          Mechanical traction   10# 5'

## 2023-11-08 ENCOUNTER — APPOINTMENT (OUTPATIENT)
Dept: PHYSICAL THERAPY | Facility: REHABILITATION | Age: 68
End: 2023-11-08
Payer: MEDICARE

## 2023-11-09 ENCOUNTER — OFFICE VISIT (OUTPATIENT)
Dept: PHYSICAL THERAPY | Facility: REHABILITATION | Age: 68
End: 2023-11-09
Payer: MEDICARE

## 2023-11-09 DIAGNOSIS — M54.2 NECK PAIN: Primary | ICD-10-CM

## 2023-11-09 PROCEDURE — 97140 MANUAL THERAPY 1/> REGIONS: CPT | Performed by: PHYSICAL THERAPIST

## 2023-11-09 NOTE — PROGRESS NOTES
Daily Note     Today's date: 2023  Patient name: Bella Santana  : 1955  MRN: [id number]  Referring provider: Kathy Moise, *  Dx:   Encounter Diagnosis     ICD-10-CM    1. Neck pain  M54.2           Start Time: 1415  Stop Time: 1500  Total time in clinic (min): 45 minutes    Subjective: Patient reports doing well. States that she continues to have relief with PT but not lasting as her neck continues to get tight, more so lately just with walking vs activity. Activity such as tennis will improve pain. Objective: See treatment diary below  + tone increase b/l scalenes, SCM, cub occipitals  CS AROM 75% all planes with exception of L rot (50%)    Assessment: Tolerated treatment well. Patient with good tolerance with MT, reported feeling less pain and tension post session. We did discuss her seeing a PT in [address] [place] [address] for dry needling. Also discussed mobility and pain benefits of TS/CTJ mobilization G5 which she was hesitant to in the past.       Plan: Continue per plan of care.       Precautions: standard       Manuals 10/16 10/23 10/26 11/1 11/6 11/9       CS dist AB AB AB AB AB AB (b/l andu/l)       CS dist with R SG AB G3 L rot mob G3-4 L rot G3-4  B/l rot mob G3-4        STM UT/ paraspinals/scalenes, sub occip AB AB - L levator AB b/l AB L AB - 20' AB-20'       SOR    AB         C1-2 MET L rot  2 bouts  2 rds          MWM CTJ flexion     2x8, rot 2x6 L        TS MWM ext  2x8           Neuro Re-Ed                                                                                                        Ther Ex             CS SNAG - L  2x8 2x6  4 reps with L rot hold actively           HEP review  3'  5'  5' (RET, RET ext, SNAG, CTJ mob)                                                                                     Ther Activity                                       Gait Training                                       Modalities   10/26          Mechanical traction   10# 5'

## 2023-11-13 ENCOUNTER — OFFICE VISIT (OUTPATIENT)
Dept: PHYSICAL THERAPY | Facility: REHABILITATION | Age: 68
End: 2023-11-13
Payer: MEDICARE

## 2023-11-13 DIAGNOSIS — M54.2 NECK PAIN: Primary | ICD-10-CM

## 2023-11-13 PROCEDURE — 97140 MANUAL THERAPY 1/> REGIONS: CPT | Performed by: PHYSICAL THERAPIST

## 2023-11-13 PROCEDURE — 97110 THERAPEUTIC EXERCISES: CPT | Performed by: PHYSICAL THERAPIST

## 2023-11-13 NOTE — PROGRESS NOTES
Daily Note     Today's date: 2023  Patient name: Tara Zarate  : 1955  MRN: [id number]  Referring provider: Fortunato Chavez, *  Dx:   Encounter Diagnosis     ICD-10-CM    1. Neck pain  M54.2           Start Time: 1135  Stop Time: 1230  Total time in clinic (min): 55 minutes    Subjective: Patient reports feeling well overall today. States that she felt good after out last PT session. She has been taking ibuprofen before playing tennis/pickle ball which helps with her pain      Objective: See treatment diary below  Hypomobile CTJ and upper TS  CS AROM: 50% ext, 50% L rot with pull on L     Assessment: Tolerated treatment well. Patient reports pain free neck motion post session. We added TS ext to further improve mobility in upper TS/CTJ with hope to increase mobility with CS AROM into ext and rot (L). She tolerated G5 mob to upper TS well. HEP reviewed. Patient to consult with PT FB in next 2-3 weeks. Plan: Continue per plan of care.       Precautions: standard       Manuals 10/16 10/23 10/26 11/1 11/6 11/9 11/13      CS dist AB AB AB AB AB AB (b/l andu/l) AB (B/L)      CS dist with R SG AB G3 L rot mob G3-4 L rot G3-4  B/l rot mob G3-4        STM UT/ paraspinals/scalenes, sub occip AB AB - L levator AB b/l AB L AB - 20' AB-20' AB - 15'      SOR    AB   AB      C1-2 MET L rot  2 bouts  2 rds          Mid CS L rot mob       G3-4      G5 upper TS mob       AB      MWM CTJ flexion     2x8, rot 2x6 L  Rot L   3x6      TS MWM ext  2x8     NP      Neuro Re-Ed                                                                                                        Ther Ex             CS SNAG - L  2x8 2x6  4 reps with L rot hold actively           HEP review  3'  5'  5' (RET, RET ext, SNAG, CTJ mob) 5' - TS ext, CTJ mob, RET)      Seated TS ext and rot       Ext 2x10  Rot  10x (L)      RET with pt OP       10x                                                          Ther Activity Gait Training                                       Modalities   10/26          Mechanical traction   10# 5'

## 2023-11-15 ENCOUNTER — OFFICE VISIT (OUTPATIENT)
Dept: PHYSICAL THERAPY | Facility: REHABILITATION | Age: 68
End: 2023-11-15
Payer: MEDICARE

## 2023-11-15 DIAGNOSIS — M54.2 NECK PAIN: Primary | ICD-10-CM

## 2023-11-15 PROCEDURE — 97110 THERAPEUTIC EXERCISES: CPT | Performed by: PHYSICAL THERAPIST

## 2023-11-15 PROCEDURE — 97140 MANUAL THERAPY 1/> REGIONS: CPT | Performed by: PHYSICAL THERAPIST

## 2023-11-15 NOTE — PROGRESS NOTES
Daily Note     Today's date: 11/15/2023  Patient name: Millicent Bone  : 1955  MRN: [id number]  Referring provider: Jovon Rodriguez, *  Dx:   Encounter Diagnosis     ICD-10-CM    1. Neck pain  M54.2           Start Time: 1325  Stop Time: 1403  Total time in clinic (min): 38 minutes    Subjective: Patient reports that she has been feeling well, no pain the last 2 days. Exercises going well. Objective: See treatment diary below      Assessment: Tolerated treatment well. Patient with improved neck mobility with b/l SG over the last 2 session. HEP reviewed with continued TS ext in chair, CTJ mob and RET. Good elevation with wall slide/touchdowns. Plan: Continue per plan of care.       Precautions: standard       Manuals 10/16 10/23 10/26 11/1 11/6 11/9 11/13 11/15     CS dist AB AB AB AB AB AB (b/l andu/l) AB (B/L) AB (B/L)     CS dist with R SG AB G3 L rot mob G3-4 L rot G3-4  B/l rot mob G3-4        STM UT/ paraspinals/scalenes, sub occip AB AB - L levator AB b/l AB L AB - 20' AB-20' AB - 15' Paraspinals -10'     SOR    AB   AB      C1-2 MET L rot  2 bouts  2 rds          Mid CS L rot mob       G3-4 G3-4     G5 upper TS mob       AB NP     MWM CTJ flexion     2x8, rot 2x6 L  Rot L   3x6 Rot L   2x6     TS MWM ext  2x8     NP      Neuro Re-Ed                                                                                                        Ther Ex             CS SNAG - L  2x8 2x6  4 reps with L rot hold actively           HEP review  3'  5'  5' (RET, RET ext, SNAG, CTJ mob) 5' - TS ext, CTJ mob, RET) 5'     Seated TS ext and rot       Ext 2x10  Rot  10x (L) Ext 2x10 diff spots     RET with pt OP       10x 10x     CTJ butterfly        10x     touchdowns        10x                               Ther Activity                                       Gait Training                                       Modalities   10/26          Mechanical traction   10# 5'

## 2023-11-22 ENCOUNTER — OFFICE VISIT (OUTPATIENT)
Dept: PHYSICAL THERAPY | Facility: REHABILITATION | Age: 68
End: 2023-11-22
Payer: MEDICARE

## 2023-11-22 DIAGNOSIS — M54.2 NECK PAIN: Primary | ICD-10-CM

## 2023-11-22 PROCEDURE — 97110 THERAPEUTIC EXERCISES: CPT | Performed by: PHYSICAL THERAPIST

## 2023-11-22 PROCEDURE — 97140 MANUAL THERAPY 1/> REGIONS: CPT | Performed by: PHYSICAL THERAPIST

## 2023-11-22 NOTE — PROGRESS NOTES
Daily Note     Today's date: 2023  Patient name: Keisha Banda  : 1955  MRN: [id number]  Referring provider: Cecy Walker, *  Dx:   Encounter Diagnosis     ICD-10-CM    1. Neck pain  M54.2           Start Time: 1105  Stop Time: 1150  Total time in clinic (min): 45 minutes    Subjective: Patient reports that she overall has been feeling good the last few weeks. Some cracking at times when turning but has not been painful with that. She was walking with her  and reported had improved moments where she was turning to talk to him with less pain. Has been more mindful of her prolonged neck positions. Objective: See treatment diary below  CS rot - 50% b/l, ext 75%, flex 75%    Assessment: Tolerated treatment well. With improved CS segmental mobility today with improved paraspinal muscle tone. ROM as noted above. We discussed her performing repeated TS ext in chair for HEP as she was having a hard time finding a chair that would work. I suggested sitting in front of a couch and attempting. Plan: Continue per plan of care.       Precautions: standard       Manuals 10/16 10/23 10/26 11/1 11/6 11/9 11/13 11/15 11/22    CS dist AB AB AB AB AB AB (b/l andu/l) AB (B/L) AB (B/L) AB B/L    CS dist with R SG AB G3 L rot mob G3-4 L rot G3-4  B/l rot mob G3-4        STM UT/ paraspinals/scalenes, sub occip AB AB - L levator AB b/l AB L AB - 20' AB-20' AB - 15' Paraspinals -10' Paraspinals b.l    SOR    AB   AB      C1-2 MET L rot  2 bouts  2 rds          Mid CS L rot mob       G3-4 G3-4 G3-4    G5 upper TS mob       AB NP NP    MWM CTJ flexion     2x8, rot 2x6 L  Rot L   3x6 Rot L   2x6 Rot L 2x6, ext to TS 2x10    TS MWM ext  2x8     NP      Neuro Re-Ed                                                                                                        Ther Ex             CS SNAG - L  2x8 2x6  4 reps with L rot hold actively           HEP review  3'  5'  5' (RET, RET ext, SNAG, CTJ mob) 5' - TS ext, CTJ mob, RET) 5' 3'    Seated TS ext and rot       Ext 2x10  Rot  10x (L) Ext 2x10 diff spots 10x at 2 different sports     RET with pt OP       10x 10x     CTJ butterfly        10x     touchdowns        10x                               Ther Activity                                       Gait Training                                       Modalities   10/26          Mechanical traction   10# 5'

## 2023-11-27 NOTE — PROGRESS NOTES
Daily Note     Today's date: 2023  Patient name: Manuel Gerber  : 1955  MRN: [id number]  Referring provider: Astrid Cronin, *  Dx:   Encounter Diagnosis     ICD-10-CM    1. Neck pain  M54.2           Start Time: 1350  Stop Time: 1435  Total time in clinic (min): 45 minutes    Subjective:     Objective: See treatment diary below      Assessment: Tolerated treatment       Plan:      Precautions: standard       Manuals 8/14 8/30 9/20 6/6 6/13 6/15 6/21 7/17 7/26 8/7   C1-2 MET - L     2 rds - 3 bouts ea 3 bouts to L       MWM - upper TS ext    2x10 L rot  2x8 With flexion   3x8   With flexion 2x6    assessment 5' 5'  5' 5' 3'  5' 5' 5'   STM UT/ paraspinals, scalenes, SCM AB paraspinals    AB  AB AB AB   CS SG - R SG L with CS traction With CS traction  G3-4  G3-4  AB AB L - with CS traction   CS distraction AB AB  AB      AB   CS RET + ext          5x (began with 10x RET)   Seated reverse NAG    AB         RET with PT OP  AB  2x5           Shoulder stretch with pec min, rib 1 inf glide    NP         CS distraction with CTJ PA    AB AB   AB     Neuro Re-Ed           SA wall slides/rock backs          10x ea   UT slides  5"x5  5x with L rot AROM CS   NV   10x     Supine DNF     10"x5 with not, 5"x10 with lift        Isometric rotation     5x ea side 5" hold        Seated TS R rot with CS fixed neutral     2x10, eye movement into L rot        OH LT pull apart        BTB  5"x5  3 rds BTB  review                              Ther Ex        RET 10x, 10x with pt.  OP   HEP     10x    RET + ext 2x5        10x    Seated CTJ mob             Seated TS ext             1/2 kneeling TS rot             C1-2 SNAG                                       Ther Activity                                       Gait Training                                       Modalities Please be informed that if you contact our office outside of normal business hours the physician on call cannot help with any scheduling or rescheduling issues, procedure instruction questions or any type of medication issue. We advise you for any urgent/emergency that you go to the nearest emergency room! PLEASE CALL OUR OFFICE DURING NORMAL BUSINESS HOURS    Monday - Friday   8 am to 5 pm    [place]: [address] [place]: [phone]    [place]:  [phone]    **It is YOUR responsibilty to bring medication bottles and/or updated medication list to [address]. This will allow us to better serve you and all your healthcare needs**    Thank you for allowing us to care for you today! We want to ensure we can follow your treatment plan and we strive to give you the best outcomes and experience possible. If you ever have a life threatening emergency and call 911 - for an ambulance (EMS)   Our providers can only care for you at:   [facility] or [facility]. Even if you have someone take you or you drive yourself we can only care for you in a [facility] facility. Our providers are not setup at the other healthcare locations! We are committed to providing you the best care possible. If you receive a survey after visiting one of our offices, please take time to share your experience concerning your physician office visit. These surveys are confidential and no health information about you is shared. We are eager to improve for you and we are counting on your feedback to help make that happen.

## 2023-12-04 ENCOUNTER — EVALUATION (OUTPATIENT)
Dept: PHYSICAL THERAPY | Facility: CLINIC | Age: 68
End: 2023-12-04
Payer: MEDICARE

## 2023-12-04 DIAGNOSIS — M54.2 CERVICALGIA: Primary | ICD-10-CM

## 2023-12-04 DIAGNOSIS — M54.6 THORACIC SPINE PAIN: ICD-10-CM

## 2023-12-04 PROCEDURE — 97161 PT EVAL LOW COMPLEX 20 MIN: CPT | Performed by: PHYSICAL THERAPIST

## 2023-12-04 NOTE — PROGRESS NOTES
PT Evaluation     Today's date: 2023  Patient name: Criss Antunez  : 1955  MRN: [id number]  Referring provider: Self, Referral  Dx:   Encounter Diagnosis     ICD-10-CM    1. Cervicalgia  M54.2       2. Thoracic spine pain  M54.6           Start Time: 1330  Stop Time: 1410  Total time in clinic (min): 40 minutes    Assessment  Assessment details: Criss Antunez is a 74 y.o. female who presents with complaints of Cervicalgia, Thoracic spine pain. No further referral appears necessary at this time based upon examination results. Patient is presenting with cervicothoracic tightness and impaired posture leading to limitations with looking to her left, driving, and participating in recreational activities. Prognosis is good given HEP compliance and PT 1-2x/wk. Positive prognostic indicators include positive attitude toward recovery. Please contact me if you have any questions or recommendations. Thank you for the opportunity to share in Fabby's care. Impairments: abnormal muscle firing, abnormal or restricted ROM, abnormal movement, lacks appropriate home exercise program, pain with function and poor posture     Symptom irritability: moderateUnderstanding of Dx/Px/POC: good   Prognosis: good    Plan  Patient would benefit from: skilled physical therapy  Planned therapy interventions: joint mobilization, manual therapy, patient education, postural training, strengthening, stretching, therapeutic activities, therapeutic exercise, home exercise program, neuromuscular re-education, flexibility, functional ROM exercises and dry needling  Frequency: 1-2x. Duration in weeks: 8  Treatment plan discussed with: patient        Subjective Evaluation    History of Present Illness  Mechanism of injury: Patient presenting to therapy with continued tightness when turning her head to the left. Does have crepitus with head movements. Slight strain when looking to the right. Slight tightness when looking down. No headaches at this time. Pain left cervical and upper thoracic region. No peripheralization of symptoms. Patient is RHD. Patient denies dizziness, dsyphagia, dysarthria, diplopia, drop attacks, nystagmus, facial numbness, nor nausea. Playing golf will also aggravate symptoms described as annoying, achiness, sharp. Has tried e-stim which has helped. Symptoms also described as compressed. Started using a new pillow for sleeping. More stiff in the morning but pillow has helped. Has been working on posture as looking down puts strain on neck. Recurrent probem    Quality of life: good    Patient Goals  Patient goals for therapy: decreased pain, increased motion and increased strength    Pain  Current pain rating: 3  At worst pain rating: 10  Alleviating factors: Tylenol/Aleve. Exacerbated by: turning head to left. Progression: no change    Treatments  Previous treatment: physical therapy  Current treatment: physical therapy      Objective    Cervical % of normal   Flex. 75p! Extn. 50   SB Left 25   SB Right 25   ROT Left 50p! ROT Right 75   Repetitive testing: retraction= better  repeated extension: no change     Flexion= no change  Thoracic extension= improves motion, reduces symptoms             MMT         AROM    Shoulder       R       L        R          L   Flex. 5 5 WNL WNL   Extn. 5 5 WNL WNL   Abd. 5 5 WNL WNL   Add. 5 5     IR. 5 5     ER. 5 5           Head positioning: forward head posture  Palpation: left UT     Spurling’s= -    Headaches =  -         Lateral flexion/rotation test = -        Cervical joint mobility: decreased CT-junction         Insurance:  AMA/CMS Eval/ Re-eval POC expires Yani Robles #/ Referral # Total    Start date  Expiration date Extension  Visit limitation? PT only or  PT+OT?  Co-Insurance   CMS 12.4.23 1.29.24                            Precautions: standard    Manuals 12.4            visit 1                                                   Neuro Re-Ed Ther Ex             T-spine extn 2*10            Cerv.  Retraction with OP 2*10                                                                                          Ther Activity             Pt ed FB                         Modalities

## 2023-12-11 ENCOUNTER — OFFICE VISIT (OUTPATIENT)
Dept: PHYSICAL THERAPY | Facility: CLINIC | Age: 68
End: 2023-12-11
Payer: MEDICARE

## 2023-12-11 DIAGNOSIS — M54.6 THORACIC SPINE PAIN: ICD-10-CM

## 2023-12-11 DIAGNOSIS — M54.2 CERVICALGIA: Primary | ICD-10-CM

## 2023-12-11 PROCEDURE — 97110 THERAPEUTIC EXERCISES: CPT | Performed by: PHYSICAL THERAPIST

## 2023-12-11 PROCEDURE — 97530 THERAPEUTIC ACTIVITIES: CPT | Performed by: PHYSICAL THERAPIST

## 2023-12-11 NOTE — PROGRESS NOTES
Daily Note     Today's date: 2023  Patient name: Criss Antunez  : 1955  MRN: [id number]  Referring provider: Self, Referral  Dx:   Encounter Diagnosis     ICD-10-CM    1. Cervicalgia  M54.2       2. Thoracic spine pain  M54.6                    Subjective: Patient reports that she is doing extremely well at this time. Full compliance reported with improved ability to look down and over her left shoulder. Objective: See treatment diary below    Assessment: Tolerated treatment well. Patient did well with cueing for thoracic extension over foam roll when sitting. Patient to contact me in a few days with update as ROM is improved and good gains have been made. Plan: Continue per plan of care. Insurance:  AMA/CMS Eval/ Re-eval POC expires Rosario Pattee #/ Referral # Total    Start date  Expiration date Extension  Visit limitation? PT only or  PT+OT? Co-Insurance   CMS 12.4.23 1.29.24                            Precautions: standard    Manuals 12.4 12.11           visit 1 2                                                  Neuro Re-Ed                                                                                                        Ther Ex             T-spine extn 2*10 2*10 seated           Cerv.  Retraction with OP 2*10 2*10           Teaching of traction with towel  FB                                                                            Ther Activity             Pt ed FB FB                        Modalities

## 2023-12-18 ENCOUNTER — APPOINTMENT (OUTPATIENT)
Dept: PHYSICAL THERAPY | Facility: CLINIC | Age: 68
End: 2023-12-18
Payer: MEDICARE

## 2023-12-20 ENCOUNTER — APPOINTMENT (OUTPATIENT)
Dept: PHYSICAL THERAPY | Facility: CLINIC | Age: 68
End: 2023-12-20
Payer: MEDICARE

## 2024-01-23 ENCOUNTER — TELEPHONE (OUTPATIENT)
Dept: INTERNAL MEDICINE CLINIC | Facility: CLINIC | Age: 69
End: 2024-01-23

## 2024-01-23 DIAGNOSIS — F41.9 ANXIETY: ICD-10-CM

## 2024-01-23 DIAGNOSIS — M54.2 NECK PAIN: Primary | ICD-10-CM

## 2024-01-23 RX ORDER — ALPRAZOLAM 0.25 MG/1
0.25 TABLET ORAL
Qty: 30 TABLET | Refills: 0 | Status: SHIPPED | OUTPATIENT
Start: 2024-01-23

## 2024-01-23 NOTE — TELEPHONE ENCOUNTER
Requested medication(s) are due for refill today: Yes  Patient has already received a courtesy refill: No  Other reason request has been forwarded to provider:

## 2024-01-23 NOTE — TELEPHONE ENCOUNTER
Please of the patient know that an order has been placed for physical therapy she just has to contact the physical therapy location of her preference to schedule her first visit.  Thank you

## 2024-01-23 NOTE — TELEPHONE ENCOUNTER
Pt wants to go back to physical therapy for her neck.  She has gone in the past but they told her she needs a new script.  Can you place the order?

## 2024-01-31 ENCOUNTER — TELEPHONE (OUTPATIENT)
Dept: INTERNAL MEDICINE CLINIC | Facility: CLINIC | Age: 69
End: 2024-01-31

## 2024-01-31 DIAGNOSIS — M79.672 LEFT FOOT PAIN: Primary | ICD-10-CM

## 2024-01-31 NOTE — TELEPHONE ENCOUNTER
Pt wants to know if an xray is recommended for her little toe, she dropped a heavy mug on her toe about 2 weeks ago. The toe is red and is painful when walking.

## 2024-01-31 NOTE — TELEPHONE ENCOUNTER
I do recommend an x-ray since the pain is still present. I need to know which foot it is so I can put an order in thank you

## 2024-02-02 ENCOUNTER — APPOINTMENT (OUTPATIENT)
Dept: RADIOLOGY | Age: 69
End: 2024-02-02
Payer: MEDICARE

## 2024-02-02 DIAGNOSIS — M79.672 LEFT FOOT PAIN: ICD-10-CM

## 2024-02-02 PROCEDURE — 73630 X-RAY EXAM OF FOOT: CPT

## 2024-02-05 ENCOUNTER — EVALUATION (OUTPATIENT)
Dept: PHYSICAL THERAPY | Facility: REHABILITATION | Age: 69
End: 2024-02-05
Payer: MEDICARE

## 2024-02-05 DIAGNOSIS — M54.2 NECK PAIN: ICD-10-CM

## 2024-02-05 PROCEDURE — 97162 PT EVAL MOD COMPLEX 30 MIN: CPT | Performed by: PHYSICAL THERAPIST

## 2024-02-05 PROCEDURE — 97110 THERAPEUTIC EXERCISES: CPT | Performed by: PHYSICAL THERAPIST

## 2024-02-05 PROCEDURE — 97140 MANUAL THERAPY 1/> REGIONS: CPT | Performed by: PHYSICAL THERAPIST
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
## 2024-02-12 ENCOUNTER — ANNUAL EXAM (OUTPATIENT)
Dept: OBGYN CLINIC | Facility: CLINIC | Age: 69
End: 2024-02-12
Payer: MEDICARE

## 2024-02-12 VITALS
DIASTOLIC BLOOD PRESSURE: 68 MMHG | SYSTOLIC BLOOD PRESSURE: 112 MMHG | HEIGHT: 64 IN | BODY MASS INDEX: 21.17 KG/M2 | WEIGHT: 124 LBS

## 2024-02-12 DIAGNOSIS — Z01.419 ENCOUNTER FOR ANNUAL ROUTINE GYNECOLOGICAL EXAMINATION: Primary | ICD-10-CM

## 2024-02-12 DIAGNOSIS — Z12.31 ENCOUNTER FOR SCREENING MAMMOGRAM FOR BREAST CANCER: ICD-10-CM

## 2024-02-12 DIAGNOSIS — Z12.11 COLON CANCER SCREENING: ICD-10-CM

## 2024-02-12 PROCEDURE — G0101 CA SCREEN;PELVIC/BREAST EXAM: HCPCS | Performed by: OBSTETRICS & GYNECOLOGY

## 2024-02-12 NOTE — PROGRESS NOTES
Assessment/Plan:  Pap smear deferred due to low risk status.  Encouraged self breast examination as well as calcium supplementation.  Continue annual mammogram.  Reviewed colon cancer screening, due for colonoscopy.  Discussed treatment options for symptomatic vaginal atrophy.  All questions answered.  Reviewed urinary voiding diary, urge incontinence, declines medical treatment.  Discussed lifestyle changes.  All questions answered.,  Urge incontinence, declines medical treatment.  Discussed lifestyle changes.  All questions answered.  She will continue to follow-up with primary care as scheduled.  Return to office in 1 year or as needed  No problem-specific Assessment & Plan notes found for this encounter.       Diagnoses and all orders for this visit:    Encounter for annual routine gynecological examination    Encounter for screening mammogram for breast cancer    Colon cancer screening  -     Ambulatory Referral to Colorectal Surgery; Future    Other orders  -     TURMERIC CURCUMIN PO; Take by mouth  -     MAGNESIUM PO; Take by mouth          Subjective:      Patient ID: Fabby Rahman is a 68 y.o. female.    HPI  This is a very pleasant 68-year-old female P2 ( x 1,  x 1) presents for her GYN exam.  She went through menopause at age 51.  She is never been on hormone replacement therapy.  She denies any vaginal bleeding or spotting.  No changes in bowel function.  She has had increased episodes of  Urge incontinence, nocturia x 1 which then will result in difficulty in falling back to sleep.  Her fluid intake is optimal including in the evening.  She has been in a monogamous relationship with her  for 43 years.  Pap smears have been normal.  Last Pap 10/2022.    She does follow-up with her family physician on a regular basis.    2023 mammo    Colon 2018    Dexa  osteopenia    The following portions of the patient's history were reviewed and updated as appropriate: allergies, current  "medications, past family history, past medical history, past social history, past surgical history, and problem list.    Review of Systems   Constitutional:  Negative for fatigue, fever and unexpected weight change.   Respiratory:  Negative for cough, chest tightness, shortness of breath and wheezing.    Cardiovascular: Negative.  Negative for chest pain and palpitations.   Gastrointestinal: Negative.  Negative for abdominal distention, abdominal pain, blood in stool, constipation, diarrhea, nausea and vomiting.   Genitourinary: Negative.  Negative for difficulty urinating, dyspareunia, dysuria, flank pain, frequency, genital sores, hematuria, pelvic pain, urgency, vaginal bleeding, vaginal discharge and vaginal pain.   Skin:  Negative for rash.         Objective:      /68   Ht 5' 4\" (1.626 m)   Wt 56.2 kg (124 lb)   BMI 21.28 kg/m²          Physical Exam  Constitutional:       Appearance: Normal appearance. She is well-developed.   HENT:      Head: Normocephalic and atraumatic.   Cardiovascular:      Rate and Rhythm: Normal rate and regular rhythm.   Pulmonary:      Effort: Pulmonary effort is normal.      Breath sounds: Normal breath sounds.   Chest:   Breasts:     Right: No inverted nipple, mass, nipple discharge, skin change or tenderness.      Left: No inverted nipple, mass, nipple discharge, skin change or tenderness.   Abdominal:      General: Bowel sounds are normal. There is no distension.      Palpations: Abdomen is soft.      Tenderness: There is no abdominal tenderness. There is no guarding or rebound.   Genitourinary:     Labia:         Right: No rash, tenderness or lesion.         Left: No rash, tenderness or lesion.       Vagina: Normal. No signs of injury. No vaginal discharge or tenderness.      Cervix: No cervical motion tenderness, discharge, friability, lesion or cervical bleeding.      Uterus: Not enlarged and not tender.       Adnexa:         Right: No mass, tenderness or fullness.     "      Left: No mass, tenderness or fullness.     Neurological:      Mental Status: She is alert.   Psychiatric:         Behavior: Behavior normal.         External genitalia is within normal limits.  The vagina is evident of estrogen deficiency.  There is no pelvic floor prolapse.

## 2024-02-13 NOTE — PROGRESS NOTES
Daily Note     Today's date: 2024  Patient name: Fabby Rahman  : 1955  MRN: [id number]  Referring provider: Tuan Gutierrez, *  Dx:   Encounter Diagnosis     ICD-10-CM    1. Neck pain  M54.2           Start Time: 1030  Stop Time: 1115  Total time in clinic (min): 45 minutes    Subjective: Patient reports that she has L side neck pain today, attributes to tennis and sleeping position when sleeping at her daughters house Monday night.       Objective: See treatment diary below  TTP  and + tone L levator scap  CS rot <50% b/l, P on L     Assessment: Tolerated treatment well. Patient  with improved CS rotation to ~ 75% post MT. She reported less pain as well. Noted decrease in b/l scapular upward rotation, L < R as we addressed this with SA wall slides and rock backs. These were provided for HEP as I feel her scap mobility may be playing a role with increase neck stress      Plan: Continue per plan of care.      Precautions: osteopenia       Manuals            Distraction AB AB           L SG             STM scalenes AB            STM L levator  AB           MWM CTJ rot  L/R 3x6 ea with end range holds for re-ed           B/l CS rot mob  AB G3           STM R pec With and without prom            Neuro Re-Ed             SA WS and rock back  10x ea           Education time  5'                                                                            Ther Ex             RET 10x 10x           RET OP 10x            RET + Ext  5x           RET + rot NV?            HEP education 5'                                                   Ther Activity                                       Gait Training                                       Modalities

## 2024-02-14 ENCOUNTER — OFFICE VISIT (OUTPATIENT)
Dept: PHYSICAL THERAPY | Facility: REHABILITATION | Age: 69
End: 2024-02-14
Payer: MEDICARE

## 2024-02-14 DIAGNOSIS — M54.2 NECK PAIN: Primary | ICD-10-CM

## 2024-02-14 PROCEDURE — 97140 MANUAL THERAPY 1/> REGIONS: CPT | Performed by: PHYSICAL THERAPIST

## 2024-02-14 PROCEDURE — 97112 NEUROMUSCULAR REEDUCATION: CPT | Performed by: PHYSICAL THERAPIST

## 2024-02-19 ENCOUNTER — OFFICE VISIT (OUTPATIENT)
Dept: PHYSICAL THERAPY | Facility: REHABILITATION | Age: 69
End: 2024-02-19
Payer: MEDICARE

## 2024-02-19 DIAGNOSIS — M54.2 NECK PAIN: Primary | ICD-10-CM

## 2024-02-19 PROCEDURE — 97110 THERAPEUTIC EXERCISES: CPT | Performed by: PHYSICAL THERAPIST

## 2024-02-19 PROCEDURE — 97140 MANUAL THERAPY 1/> REGIONS: CPT | Performed by: PHYSICAL THERAPIST

## 2024-02-19 PROCEDURE — 97112 NEUROMUSCULAR REEDUCATION: CPT | Performed by: PHYSICAL THERAPIST

## 2024-02-19 NOTE — PROGRESS NOTES
Daily Note     Today's date: 2024  Patient name: Fabby Rahman  : 1955  MRN: [id number]  Referring provider: Tuan Gutierrez, *  Dx:   Encounter Diagnosis     ICD-10-CM    1. Neck pain  M54.2           Start Time: 1100  Stop Time: 1155  Total time in clinic (min): 55 minutes    Subjective: Patient reports that she was sore after PT last week. Soreness last a day and then she reported good relief, better overall. Reports feeling well today.      Objective: See treatment diary below  CS AROM 75% all dir with reports of mild pull into UT region with rot  Improved scapular upward rot from last week as she meets midline thorax  (-) ANTT throughout UE     Assessment: Tolerated treatment well. Patient with improvements as noted above. Cues for protraction vs TS flexion with qped rock backs. Will update HEP NV to incorporate MT re-ed which was performed well today.       Plan: Continue per plan of care.      Precautions: osteopenia       Manuals           Distraction AB AB AB          L SG             STM scalenes AB            STM L levator  AB AB with prom          MWM CTJ rot  L/R 3x6 ea with end range holds for re-ed NP          B/l CS rot mob  AB G3 NP          Proe CTJ PA mob   G3          STM R pec With and without prom            Neuro Re-Ed            SA WS and rock back  10x ea 15x qped, 10x wall          Education time  5' 5'          B/L ER + raise   GTB - 2x8                                                              Ther Ex             RET 10x 10x 5x          RET OP 10x            RET + Ext  5x 3x          RET + rot NV?            HEP education 5'            TS ext   10x                                    Ther Activity                                       Gait Training                                       Modalities

## 2024-02-21 ENCOUNTER — TELEPHONE (OUTPATIENT)
Age: 69
End: 2024-02-21

## 2024-02-21 NOTE — TELEPHONE ENCOUNTER
Patients GI provider:  Dr Lola Jeffries    Number to return call: [phone]    Reason for call: Pt calling to request the report for 02/08/2018 colonoscopy be sent over to new doc. Fax# [phone]    Scheduled procedure/appointment date if applicable: N/A

## 2024-02-26 ENCOUNTER — OFFICE VISIT (OUTPATIENT)
Dept: PHYSICAL THERAPY | Facility: REHABILITATION | Age: 69
End: 2024-02-26
Payer: MEDICARE

## 2024-02-26 DIAGNOSIS — M54.2 NECK PAIN: Primary | ICD-10-CM

## 2024-02-26 PROCEDURE — 97112 NEUROMUSCULAR REEDUCATION: CPT | Performed by: PHYSICAL THERAPIST

## 2024-02-26 PROCEDURE — 97140 MANUAL THERAPY 1/> REGIONS: CPT | Performed by: PHYSICAL THERAPIST

## 2024-02-26 NOTE — PROGRESS NOTES
Daily Note     Today's date: 2024  Patient name: Fabby Rahman  : 1955  MRN: [id number]  Referring provider: Tuan Gutierrez, *  Dx:   Encounter Diagnosis     ICD-10-CM    1. Neck pain  M54.2           Start Time: 1100  Stop Time: 1145  Total time in clinic (min): 45 minutes    Subjective: Patient reports that she felt well since last session. States that she did fly to [place] and played golf without limitation. Neck doing better.Was happy being able to turn L/R when talking to guests without pain.       Objective: See treatment diary below      Assessment: Tolerated treatment well. Patient demonstrates good form with scap re-ed. ROM improving, 50-75% all planes. Will continue to work on scap strength as I feel this will be preventative.       Plan: Continue per plan of care.      Precautions: osteopenia       Manuals          Distraction AB AB AB AB         L SG             STM scalenes AB            STM L levator  AB AB with prom AB with prom         MWM CTJ rot  L/R 3x6 ea with end range holds for re-ed NP 5x ea side         B/l CS rot mob  AB G3 NP          Proe CTJ PA mob   G3 G3 PA, mid TS PA         SL scap mob - STM superior scap    AB         STM R pec With and without prom            Neuro Re-Ed           SA WS and rock back  10x ea 15x qped, 10x wall          Education time  5' 5' 3'         B/L ER + raise   GTB - 2x8 GTB  10x    BTB  10x                                                             Ther Ex            RET 10x 10x 5x          RET OP 10x            RET + Ext  5x 3x 5x         RET + rot NV?   10x ea side         HEP education 5'            TS ext   10x                                    Ther Activity                                       Gait Training                                       Modalities

## 2024-02-29 NOTE — TELEPHONE ENCOUNTER
Patients GI provider:  Former Patient of Dr. ANALILIA Jeffries    Number to return call: [phone]  Reason for call: Pt called in requesting  last colonoscopy report performed on 02/08/2018 By Dr. Lola Jeffries fax over to Dr. Amaral office. Fax number is [phone]. Please reach out to patient when fax has been sent, thank you.    Scheduled procedure/appointment date if applicable: Apt/procedure n/a

## 2024-03-04 ENCOUNTER — APPOINTMENT (OUTPATIENT)
Dept: PHYSICAL THERAPY | Facility: REHABILITATION | Age: 69
End: 2024-03-04
Payer: MEDICARE

## 2024-03-07 NOTE — PROGRESS NOTES
Daily Note     Today's date: 3/8/2024  Patient name: Fabby Rahman  : 1955  MRN: [id number]  Referring provider: Tuan Gutierrez, *  Dx:   Encounter Diagnosis     ICD-10-CM    1. Neck pain  M54.2           Start Time: 1230  Stop Time: 1300  Total time in clinic (min): 30 minutes    Subjective: Patient reports that she had bee feeling will until about Tuesday of this week. Sore after UE lifting and tennis      Objective: See treatment diary below      Assessment: Tolerated treatment well. Improved CS rot with reports of less pain after session. Discussed continued HEP with working in CS RET + rot and assessing response.       Plan: Continue per plan of care.      Precautions: osteopenia       Manuals 2/5 2/14 2/19 3/8         Distraction AB AB AB AB         L SG             STM scalenes AB            STM L levator  AB AB with prom AB with prom         MWM CTJ rot  L/R 3x6 ea with end range holds for re-ed NP 5x ea side         B/l CS rot mob  AB G3 NP          Proe CTJ PA mob   G3 G3 PA, mid TS PA         SL scap mob - STM superior scap    AB         STM R pec With and without prom            Neuro Re-Ed  2/14 2/19 3/8         SA WS and rock back  10x ea 15x qped, 10x wall          Education time  5' 5'          B/L ER + raise   GTB - 2x8                                                              Ther Ex   2/19 3/8         RET 10x 10x 5x          RET OP 10x            RET + Ext  5x 3x 5x         RET + rot NV?   10x ea side         HEP education 5'            TS ext   10x                                    Ther Activity                                       Gait Training                                       Modalities

## 2024-03-08 ENCOUNTER — OFFICE VISIT (OUTPATIENT)
Dept: PHYSICAL THERAPY | Facility: REHABILITATION | Age: 69
End: 2024-03-08
Payer: MEDICARE

## 2024-03-08 DIAGNOSIS — M54.2 NECK PAIN: Primary | ICD-10-CM

## 2024-03-08 PROCEDURE — 97140 MANUAL THERAPY 1/> REGIONS: CPT | Performed by: PHYSICAL THERAPIST

## 2024-03-15 ENCOUNTER — OFFICE VISIT (OUTPATIENT)
Dept: PHYSICAL THERAPY | Facility: REHABILITATION | Age: 69
End: 2024-03-15
Payer: MEDICARE

## 2024-03-15 DIAGNOSIS — M54.2 NECK PAIN: Primary | ICD-10-CM

## 2024-03-15 PROCEDURE — 97110 THERAPEUTIC EXERCISES: CPT | Performed by: PHYSICAL THERAPIST

## 2024-03-15 PROCEDURE — 97140 MANUAL THERAPY 1/> REGIONS: CPT | Performed by: PHYSICAL THERAPIST

## 2024-03-15 NOTE — PROGRESS NOTES
Daily Note     Today's date: 3/15/2024  Patient name: Fabby Rahman  : 1955  MRN: [id number]  Referring provider: Tuan Gutierrez, *  Dx:   Encounter Diagnosis     ICD-10-CM    1. Neck pain  M54.2           Start Time: 1215  Stop Time: 1253  Total time in clinic (min): 38 minutes    Subjective: Patient reports feeling well, neck has been good, was a little sore after PT but then her symptoms overall improved       Objective: See treatment diary below  P R rot - improved after RET + ROT    Assessment: Tolerated treatment well. Patient with resolved pulling with CS AROM. We progressed band resistance today as she demonstrated good form with ER with lift off. Added in TS rot at wall to decrease strain on CS with all of her rotational activity.       Plan: Continue per plan of care.      Precautions: osteopenia       Manuals 2/5 2/14 2/19 3/8 3/15        Dist + RET     6x        Distraction AB AB AB AB AB        L SG             STM scalenes AB            STM L levator  AB AB with prom AB with prom AB        MWM CTJ rot  L/R 3x6 ea with end range holds for re-ed NP 5x ea side         B/l CS rot mob  AB G3 NP          Proe CTJ PA mob   G3 G3 PA, mid TS PA         SL scap mob - STM superior scap    AB AB        TS rot - 1/2 kneel     10x ea side with breathing        STM R pec With and without prom            Neuro Re-Ed  2/14 2/19 3/8 3/15        SA WS and rock back  10x ea 15x qped, 10x wall          Education time  5' 5'          B/L ER + raise   GTB - 2x8  GTB  10x  BTB  10x                                                            Ther Ex   2/19 3/8         RET 10x 10x 5x          RET OP 10x            RET + Ext  5x 3x 5x         RET + rot NV?   10x ea side         HEP education 5'            TS ext   10x                                    Ther Activity                                       Gait Training                                       Modalities

## 2024-03-18 ENCOUNTER — OFFICE VISIT (OUTPATIENT)
Dept: PHYSICAL THERAPY | Facility: REHABILITATION | Age: 69
End: 2024-03-18
Payer: MEDICARE

## 2024-03-18 DIAGNOSIS — M54.2 NECK PAIN: Primary | ICD-10-CM

## 2024-03-18 PROCEDURE — 97140 MANUAL THERAPY 1/> REGIONS: CPT | Performed by: PHYSICAL THERAPIST

## 2024-03-18 PROCEDURE — 97110 THERAPEUTIC EXERCISES: CPT | Performed by: PHYSICAL THERAPIST

## 2024-03-18 NOTE — PROGRESS NOTES
Daily Note     Today's date: 3/18/2024  Patient name: Fabby Rahman  : 1955  MRN: [id number]  Referring provider: Tuan Gutierrez, *  Dx:   Encounter Diagnosis     ICD-10-CM    1. Neck pain  M54.2           Start Time: 1400  Stop Time: 1438  Total time in clinic (min): 38 minutes    Subjective: Patient reports doing well. Neck feels good, minimal stiffness.       Objective: See treatment diary below  Pfree ROM  FOTO goal met    Assessment: Tolerated treatment well. Patient overall demonstrates good CS AROM. She had minimal tissue tone increase L levator, improved. Cues for scapular upward rotation and elevation but improved once manual and tactile cues were provided.       Plan: Continue per plan of care.      Precautions: osteopenia       Manuals 2/5 2/14 2/19 3/8 3/15 3/18       Dist + RET     6x 3x5       Distraction AB AB AB AB AB AB       L SG             STM scalenes AB            STM L levator  AB AB with prom AB with prom AB STM superior scap b/l       MWM CTJ rot  L/R 3x6 ea with end range holds for re-ed NP 5x ea side         B/l CS rot mob  AB G3 NP          Proe CTJ PA mob   G3 G3 PA, mid TS PA         SL scap mob - STM superior scap    AB AB AB       TS rot - 1/2 kneel     10x ea side with breathing        STM R pec With and without prom            Neuro Re-Ed  2/14 2/19 3/8 3/15 3x18       SA WS and rock back  10x ea 15x qped, 10x wall   10x       Education time  5' 5'          B/L ER + raise   GTB - 2x8  GTB  10x  BTB  10x BTB  2x8                                                           Ther Ex   2/19 3/8         RET 10x 10x 5x          RET OP 10x            RET + Ext  5x 3x 5x         RET + rot NV?   10x ea side         HEP education 5'            TS ext   10x                                    Ther Activity                                       Gait Training                                       Modalities

## 2024-04-29 ENCOUNTER — TELEPHONE (OUTPATIENT)
Age: 69
End: 2024-04-29

## 2024-04-29 DIAGNOSIS — Z13.0 SCREENING FOR DEFICIENCY ANEMIA: ICD-10-CM

## 2024-04-29 DIAGNOSIS — Z13.1 SCREENING FOR DIABETES MELLITUS: Primary | ICD-10-CM

## 2024-04-29 DIAGNOSIS — R39.9 UTI SYMPTOMS: ICD-10-CM

## 2024-04-29 DIAGNOSIS — Z12.11 SCREENING FOR COLON CANCER: ICD-10-CM

## 2024-04-29 DIAGNOSIS — E78.5 HYPERLIPIDEMIA, UNSPECIFIED HYPERLIPIDEMIA TYPE: ICD-10-CM

## 2024-04-29 NOTE — TELEPHONE ENCOUNTER
Orders have been placed at [facility]'s laboratory for upcoming physical exam please have her fast for 10 to 12 hours the night before.  Thank you

## 2024-04-29 NOTE — TELEPHONE ENCOUNTER
Pt. Needs blood work to be put in her chart for her 5/6 dr. Visit.  Please advise Pt. When done.  Thank you

## 2024-05-03 ENCOUNTER — APPOINTMENT (OUTPATIENT)
Dept: LAB | Facility: CLINIC | Age: 69
End: 2024-05-03
Payer: MEDICARE

## 2024-05-03 DIAGNOSIS — Z13.0 SCREENING FOR DEFICIENCY ANEMIA: ICD-10-CM

## 2024-05-03 DIAGNOSIS — Z13.1 SCREENING FOR DIABETES MELLITUS: ICD-10-CM

## 2024-05-03 DIAGNOSIS — E78.5 HYPERLIPIDEMIA, UNSPECIFIED HYPERLIPIDEMIA TYPE: ICD-10-CM

## 2024-05-03 DIAGNOSIS — Z12.11 SCREENING FOR COLON CANCER: ICD-10-CM

## 2024-05-03 DIAGNOSIS — R39.9 UTI SYMPTOMS: ICD-10-CM

## 2024-05-03 LAB
ALBUMIN SERPL BCP-MCNC: 4.2 G/DL (ref 3.5–5)
ALP SERPL-CCNC: 55 U/L (ref 34–104)
ALT SERPL W P-5'-P-CCNC: 16 U/L (ref 7–52)
ANION GAP SERPL CALCULATED.3IONS-SCNC: 5 MMOL/L (ref 4–13)
AST SERPL W P-5'-P-CCNC: 27 U/L (ref 13–39)
BACTERIA UR QL AUTO: ABNORMAL /HPF
BASOPHILS # BLD AUTO: 0.07 THOUSANDS/ÂΜL (ref 0–0.1)
BASOPHILS NFR BLD AUTO: 1 % (ref 0–1)
BILIRUB SERPL-MCNC: 0.54 MG/DL (ref 0.2–1)
BILIRUB UR QL STRIP: NEGATIVE
BUN SERPL-MCNC: 15 MG/DL (ref 5–25)
CALCIUM SERPL-MCNC: 9.7 MG/DL (ref 8.4–10.2)
CHLORIDE SERPL-SCNC: 103 MMOL/L (ref 96–108)
CHOLEST SERPL-MCNC: 195 MG/DL
CLARITY UR: CLEAR
CO2 SERPL-SCNC: 31 MMOL/L (ref 21–32)
COLOR UR: ABNORMAL
CREAT SERPL-MCNC: 0.65 MG/DL (ref 0.6–1.3)
EOSINOPHIL # BLD AUTO: 0.14 THOUSAND/ÂΜL (ref 0–0.61)
EOSINOPHIL NFR BLD AUTO: 3 % (ref 0–6)
ERYTHROCYTE [DISTWIDTH] IN BLOOD BY AUTOMATED COUNT: 13 % (ref 11.6–15.1)
GFR SERPL CREATININE-BSD FRML MDRD: 92 ML/MIN/1.73SQ M
GLUCOSE P FAST SERPL-MCNC: 84 MG/DL (ref 65–99)
GLUCOSE UR STRIP-MCNC: NEGATIVE MG/DL
HCT VFR BLD AUTO: 43.5 % (ref 34.8–46.1)
HDLC SERPL-MCNC: 73 MG/DL
HGB BLD-MCNC: 13.9 G/DL (ref 11.5–15.4)
HGB UR QL STRIP.AUTO: NEGATIVE
IMM GRANULOCYTES # BLD AUTO: 0.01 THOUSAND/UL (ref 0–0.2)
IMM GRANULOCYTES NFR BLD AUTO: 0 % (ref 0–2)
KETONES UR STRIP-MCNC: NEGATIVE MG/DL
LDLC SERPL CALC-MCNC: 114 MG/DL (ref 0–100)
LEUKOCYTE ESTERASE UR QL STRIP: ABNORMAL
LYMPHOCYTES # BLD AUTO: 1.55 THOUSANDS/ÂΜL (ref 0.6–4.47)
LYMPHOCYTES NFR BLD AUTO: 32 % (ref 14–44)
MCH RBC QN AUTO: 30.5 PG (ref 26.8–34.3)
MCHC RBC AUTO-ENTMCNC: 32 G/DL (ref 31.4–37.4)
MCV RBC AUTO: 96 FL (ref 82–98)
MONOCYTES # BLD AUTO: 0.52 THOUSAND/ÂΜL (ref 0.17–1.22)
MONOCYTES NFR BLD AUTO: 11 % (ref 4–12)
NEUTROPHILS # BLD AUTO: 2.54 THOUSANDS/ÂΜL (ref 1.85–7.62)
NEUTS SEG NFR BLD AUTO: 53 % (ref 43–75)
NITRITE UR QL STRIP: NEGATIVE
NON-SQ EPI CELLS URNS QL MICRO: ABNORMAL /HPF
NONHDLC SERPL-MCNC: 122 MG/DL
NRBC BLD AUTO-RTO: 0 /100 WBCS
PH UR STRIP.AUTO: 6.5 [PH]
PLATELET # BLD AUTO: 269 THOUSANDS/UL (ref 149–390)
PMV BLD AUTO: 11.2 FL (ref 8.9–12.7)
POTASSIUM SERPL-SCNC: 4.2 MMOL/L (ref 3.5–5.3)
PROT SERPL-MCNC: 6.7 G/DL (ref 6.4–8.4)
PROT UR STRIP-MCNC: NEGATIVE MG/DL
RBC # BLD AUTO: 4.55 MILLION/UL (ref 3.81–5.12)
RBC #/AREA URNS AUTO: ABNORMAL /HPF
SODIUM SERPL-SCNC: 139 MMOL/L (ref 135–147)
SP GR UR STRIP.AUTO: 1.01 (ref 1–1.03)
TRIGL SERPL-MCNC: 42 MG/DL
UROBILINOGEN UR STRIP-ACNC: <2 MG/DL
WBC # BLD AUTO: 4.83 THOUSAND/UL (ref 4.31–10.16)
WBC #/AREA URNS AUTO: ABNORMAL /HPF

## 2024-05-03 PROCEDURE — 36415 COLL VENOUS BLD VENIPUNCTURE: CPT

## 2024-05-03 PROCEDURE — 80061 LIPID PANEL: CPT

## 2024-05-03 PROCEDURE — 85025 COMPLETE CBC W/AUTO DIFF WBC: CPT

## 2024-05-03 PROCEDURE — 80053 COMPREHEN METABOLIC PANEL: CPT

## 2024-05-03 PROCEDURE — 81001 URINALYSIS AUTO W/SCOPE: CPT

## 2024-05-06 ENCOUNTER — OFFICE VISIT (OUTPATIENT)
Dept: INTERNAL MEDICINE CLINIC | Facility: CLINIC | Age: 69
End: 2024-05-06
Payer: MEDICARE

## 2024-05-06 VITALS
WEIGHT: 121 LBS | OXYGEN SATURATION: 98 % | RESPIRATION RATE: 16 BRPM | BODY MASS INDEX: 20.66 KG/M2 | SYSTOLIC BLOOD PRESSURE: 110 MMHG | HEIGHT: 64 IN | HEART RATE: 78 BPM | DIASTOLIC BLOOD PRESSURE: 70 MMHG

## 2024-05-06 DIAGNOSIS — F41.9 ANXIETY: ICD-10-CM

## 2024-05-06 DIAGNOSIS — E78.5 HYPERLIPIDEMIA, UNSPECIFIED HYPERLIPIDEMIA TYPE: Primary | ICD-10-CM

## 2024-05-06 DIAGNOSIS — I07.1 TRACE TRICUSPID VALVE REGURGITATION: ICD-10-CM

## 2024-05-06 DIAGNOSIS — M85.89 OSTEOPENIA OF MULTIPLE SITES: ICD-10-CM

## 2024-05-06 DIAGNOSIS — I37.1 NONRHEUMATIC PULMONARY VALVE INSUFFICIENCY: ICD-10-CM

## 2024-05-06 PROBLEM — J02.9 PHARYNGITIS: Status: RESOLVED | Noted: 2023-09-08 | Resolved: 2024-05-06

## 2024-05-06 PROCEDURE — G0438 PPPS, INITIAL VISIT: HCPCS | Performed by: INTERNAL MEDICINE

## 2024-05-06 PROCEDURE — 99214 OFFICE O/P EST MOD 30 MIN: CPT | Performed by: INTERNAL MEDICINE

## 2024-05-06 RX ORDER — ALPRAZOLAM 0.25 MG/1
0.25 TABLET ORAL
Qty: 30 TABLET | Refills: 0 | Status: SHIPPED | OUTPATIENT
Start: 2024-05-06

## 2024-05-06 NOTE — ASSESSMENT & PLAN NOTE
Intermittent symptoms of stress and anxiety reviewed patient uses alprazolam 0.25 mg on an as-needed basis especially at bedtime to help relax and fall asleep.  No indication of impairment from the medication or abnormal use of the drug.

## 2024-05-06 NOTE — PROGRESS NOTES
Assessment and Plan:     Problem List Items Addressed This Visit          Cardiovascular and Mediastinum    Trace tricuspid valve regurgitation     Patient's murmur has not changed he remains asymptomatic recommend continued surveillance         Nonrheumatic pulmonary valve insufficiency     Heart murmur has been remained unchanged patient is asymptomatic recommend continued surveillance            Musculoskeletal and Integument    Osteopenia of multiple sites     Commend weightbearing exercise such as walking vitamin D 2000 units daily and calcium 1000 mg daily            Behavioral Health    Anxiety     Intermittent symptoms of stress and anxiety reviewed patient uses alprazolam 0.25 mg on an as-needed basis especially at bedtime to help relax and fall asleep.  No indication of impairment from the medication or abnormal use of the drug.         Relevant Medications    ALPRAZolam (XANAX) 0.25 mg tablet       Other    Hyperlipidemia - Primary     Lipid profile reviewed with the patient today mild elevation of LDL at 114 recommend efforts to reduce saturated fats in the diet.  Diet reviewed with the patient today             Preventive health issues were discussed with patient, and age appropriate screening tests were ordered as noted in patient's After Visit Summary.  Personalized health advice and appropriate referrals for health education or preventive services given if needed, as noted in patient's After Visit Summary.     History of Present Illness:     Patient presents for a Medicare Wellness Visit    This pleasant 68-year-old female patient returns to our office today for an annual physical examination and review of blood work.  She expresses no specific complaints on today's visit.       Patient Care Team:  Tuan Gutierrez MD as PCP - General     Review of Systems:     Review of Systems   All other systems reviewed and are negative.       Problem List:     Patient Active Problem List   Diagnosis     Hyperlipidemia    Osteopenia of multiple sites    Anxiety    Palpitations    Primary insomnia    Trace tricuspid valve regurgitation    Nonrheumatic pulmonary valve insufficiency    Eczema      Past Medical and Surgical History:     Past Medical History:   Diagnosis Date    Allergic contact dermatitis     Allergic to insect bites and stings     Conjunctivitis     Herpes simplex infection     last assessed: 2013    Laryngitis     Pharyngitis     Pneumonia      Past Surgical History:   Procedure Laterality Date     SECTION      MOUTH SURGERY        Family History:     Family History   Problem Relation Age of Onset    Glaucoma Mother     Heart disease Mother     Hypertension Mother     Parkinsonism Father     No Known Problems Sister     No Known Problems Daughter     Heart disease Maternal Grandmother     No Known Problems Maternal Grandfather     Cancer Paternal Grandmother         unsure of type--in digestive tract    No Known Problems Paternal Grandfather     No Known Problems Brother     Melanoma Brother     No Known Problems Son     Lung cancer Maternal Aunt     No Known Problems Paternal Aunt     No Known Problems Paternal Aunt     No Known Problems Paternal Aunt     No Known Problems Paternal Aunt     No Known Problems Paternal Aunt     Atrial fibrillation Family     Cancer Family     Heart disease Family     Hypertension Family     Parkinsonism Family       Social History:     Social History     Socioeconomic History    Marital status: /Civil Union     Spouse name: None    Number of children: None    Years of education: None    Highest education level: None   Occupational History    None   Tobacco Use    Smoking status: Never    Smokeless tobacco: Never   Vaping Use    Vaping status: Never Used   Substance and Sexual Activity    Alcohol use: Yes     Comment: soc    Drug use: No    Sexual activity: Yes     Partners: Male     Birth control/protection: Post-menopausal   Other Topics Concern     None   Social History Narrative    Daily coffee consumption - 2 cups a day     Exercising regularly      Social Determinants of Health     Financial Resource Strain: Low Risk  (5/1/2023)    Overall Financial Resource Strain (CARDIA)     Difficulty of Paying Living Expenses: Not hard at all   Food Insecurity: No Food Insecurity (5/6/2024)    Hunger Vital Sign     Worried About Running Out of Food in the Last Year: Never true     Ran Out of Food in the Last Year: Never true   Transportation Needs: No Transportation Needs (5/6/2024)    PRAPARE - Transportation     Lack of Transportation (Medical): No     Lack of Transportation (Non-Medical): No   Physical Activity: Not on file   Stress: Not on file   Social Connections: Not on file   Intimate Partner Violence: Not on file   Housing Stability: Unknown (5/6/2024)    Housing Stability Vital Sign     Unable to Pay for Housing in the Last Year: No     Number of Places Lived in the Last Year: Not on file     Unstable Housing in the Last Year: No      Medications and Allergies:     Current Outpatient Medications   Medication Sig Dispense Refill    ALPRAZolam (XANAX) 0.25 mg tablet Take 1 tablet (0.25 mg total) by mouth daily at bedtime as needed for anxiety 30 tablet 0    Calcium Carbonate-Vit D-Min (CALCIUM 1200 PO) Take by mouth      Cholecalciferol 1000 units capsule Take 2,000 Units by mouth daily      MAGNESIUM PO Take by mouth      mometasone (ELOCON) 0.1 % cream       multivitamin (THERAGRAN) TABS Take 1 tablet by mouth daily      Omega-3 Fatty Acids (FISH OIL PO) Take 2 g by mouth      TURMERIC CURCUMIN PO Take by mouth      VITAMIN C, CALCIUM ASCORBATE, PO Take by mouth       No current facility-administered medications for this visit.     No Known Allergies   Immunizations:     Immunization History   Administered Date(s) Administered    COVID-19 MODERNA VACC 0.25 ML IM BOOSTER 01/05/2022    COVID-19 MODERNA VACC 0.5 ML IM 01/23/2021, 02/20/2021, 01/05/2022    Hep A,  adult 07/23/2004, 01/28/2005    INFLUENZA 09/23/2013, 11/22/2014, 11/28/2015, 11/28/2015, 09/29/2016, 09/29/2016, 10/16/2017, 10/17/2018    Influenza Quadrivalent, 6-35 Months IM 09/29/2016, 10/16/2017    Influenza, recombinant, quadrivalent,injectable, preservative free 11/30/2021    Influenza, seasonal, injectable 11/15/2006, 12/05/2007, 09/23/2013    Td (adult), adsorbed 06/25/2007    Tdap 02/12/2001, 11/07/2013, 05/01/2023    Zoster Vaccine Recombinant 03/30/2021, 08/12/2021      Health Maintenance:         Topic Date Due    Colorectal Cancer Screening  05/23/2023    Breast Cancer Screening: Mammogram  08/16/2024    Hepatitis C Screening  Completed         Topic Date Due    COVID-19 Vaccine (5 - 2023-24 season) 09/01/2023      Medicare Screening Tests and Risk Assessments:     Fabby is here for her Subsequent Wellness visit. Last Medicare Wellness visit information reviewed, patient interviewed and updates made to the record today.      Health Risk Assessment:   Patient rates overall health as excellent. Patient feels that their physical health rating is slightly better. Patient is very satisfied with their life. Eyesight was rated as same. Hearing was rated as same. Patient feels that their emotional and mental health rating is much better. Patients states they are never, rarely angry. Patient states they are never, rarely unusually tired/fatigued. Pain experienced in the last 7 days has been none. Patient states that she has experienced no weight loss or gain in last 6 months.     Depression Screening:   PHQ-2 Score: 0      Fall Risk Screening:   In the past year, patient has experienced: no history of falling in past year      Urinary Incontinence Screening:   Patient has not leaked urine accidently in the last six months.     Home Safety:  Patient does not have trouble with stairs inside or outside of their home. Patient has working smoke alarms and has working carbon monoxide detector. Home safety hazards  include: none.     Nutrition:   Current diet is Regular and Limited junk food.     Medications:   Patient is currently taking over-the-counter supplements. OTC medications include: see medication list. Patient is able to manage medications.     Activities of Daily Living (ADLs)/Instrumental Activities of Daily Living (IADLs):   Walk and transfer into and out of bed and chair?: Yes  Dress and groom yourself?: Yes    Bathe or shower yourself?: Yes    Feed yourself? Yes  Do your laundry/housekeeping?: Yes  Manage your money, pay your bills and track your expenses?: Yes  Make your own meals?: Yes    Do your own shopping?: Yes    Previous Hospitalizations:   Any hospitalizations or ED visits within the last 12 months?: No      Advance Care Planning:   Living will: Yes    Durable POA for healthcare: Yes    Advanced directive: Yes      PREVENTIVE SCREENINGS      Cardiovascular Screening:    General: Screening Not Indicated and History Lipid Disorder      Diabetes Screening:     General: Screening Current      Colorectal Cancer Screening:     General: Screening Current      Breast Cancer Screening:     General: Screening Current      Cervical Cancer Screening:    General: Screening Not Indicated      Lung Cancer Screening:     General: Screening Not Indicated      Hepatitis C Screening:    General: Screening Current    Screening, Brief Intervention, and Referral to Treatment (SBIRT)    Screening  Typical number of drinks in a day: 1  Typical number of drinks in a week: 3  Interpretation: Low risk drinking behavior.    AUDIT-C Screenin) How often did you have a drink containing alcohol in the past year? 2 to 4 times a month  2) How many drinks did you have on a typical day when you were drinking in the past year? 1 to 2  3) How often did you have 6 or more drinks on one occasion in the past year? never    AUDIT-C Score: 2  Interpretation: Score 0-2 (female): Negative screen for alcohol misuse    Single Item Drug  "Screening:  How often have you used an illegal drug (including marijuana) or a prescription medication for non-medical reasons in the past year? never    Single Item Drug Screen Score: 0  Interpretation: Negative screen for possible drug use disorder    No results found.     Physical Exam:     /70   Pulse 78   Resp 16   Ht 5' 4\" (1.626 m)   Wt 54.9 kg (121 lb)   SpO2 98%   BMI 20.77 kg/m²     Physical Exam  Vitals and nursing note reviewed.   Constitutional:       General: She is not in acute distress.     Appearance: She is well-developed.   HENT:      Head: Normocephalic and atraumatic.      Right Ear: Tympanic membrane, ear canal and external ear normal.      Left Ear: Tympanic membrane, ear canal and external ear normal.      Nose: Nose normal.      Mouth/Throat:      Mouth: Mucous membranes are moist.      Pharynx: Oropharynx is clear.   Eyes:      Conjunctiva/sclera: Conjunctivae normal.   Neck:      Vascular: No carotid bruit.   Cardiovascular:      Rate and Rhythm: Normal rate and regular rhythm.      Heart sounds: Murmur heard.   Pulmonary:      Effort: Pulmonary effort is normal. No respiratory distress.      Breath sounds: Normal breath sounds. No wheezing, rhonchi or rales.   Abdominal:      General: Abdomen is flat. Bowel sounds are normal. There is no distension.      Palpations: Abdomen is soft. There is no mass.      Tenderness: There is no abdominal tenderness. There is no guarding.   Musculoskeletal:         General: No swelling.      Cervical back: Normal range of motion and neck supple. No rigidity or tenderness.      Right lower leg: No edema.      Left lower leg: No edema.   Lymphadenopathy:      Cervical: No cervical adenopathy.   Skin:     General: Skin is warm and dry.      Capillary Refill: Capillary refill takes less than 2 seconds.      Coloration: Skin is not jaundiced or pale.   Neurological:      General: No focal deficit present.      Mental Status: She is alert. Mental " status is at baseline.   Psychiatric:         Mood and Affect: Mood normal.         Behavior: Behavior normal.         Thought Content: Thought content normal.         Judgment: Judgment normal.          Tuan Gutierrez MD

## 2024-05-06 NOTE — ASSESSMENT & PLAN NOTE
Lipid profile reviewed with the patient today mild elevation of LDL at 114 recommend efforts to reduce saturated fats in the diet.  Diet reviewed with the patient today

## 2024-05-06 NOTE — ASSESSMENT & PLAN NOTE
Commend weightbearing exercise such as walking vitamin D 2000 units daily and calcium 1000 mg daily

## 2024-05-06 NOTE — PATIENT INSTRUCTIONS
Medicare Preventive Visit Patient Instructions  Thank you for completing your Welcome to Medicare Visit or Medicare Annual Wellness Visit today. Your next wellness visit will be due in one year (5/7/2025).  The screening/preventive services that you may require over the next 5-10 years are detailed below. Some tests may not apply to you based off risk factors and/or age. Screening tests ordered at today's visit but not completed yet may show as past due. Also, please note that scanned in results may not display below.  Preventive Screenings:  Service Recommendations Previous Testing/Comments   Colorectal Cancer Screening  * Colonoscopy    * Fecal Occult Blood Test (FOBT)/Fecal Immunochemical Test (FIT)  * Fecal DNA/Cologuard Test  * Flexible Sigmoidoscopy Age: 45-75 years old   Colonoscopy: every 10 years (may be performed more frequently if at higher risk)  OR  FOBT/FIT: every 1 year  OR  Cologuard: every 3 years  OR  Sigmoidoscopy: every 5 years  Screening may be recommended earlier than age 45 if at higher risk for colorectal cancer. Also, an individualized decision between you and your healthcare provider will decide whether screening between the ages of 76-85 would be appropriate. Colonoscopy: 02/08/2018  FOBT/FIT: 05/22/2023  Cologuard: Not on file  Sigmoidoscopy: Not on file    Screening Current     Breast Cancer Screening Age: 40+ years old  Frequency: every 1-2 years  Not required if history of left and right mastectomy Mammogram: 08/16/2023    Screening Current   Cervical Cancer Screening Between the ages of 21-29, pap smear recommended once every 3 years.   Between the ages of 30-65, can perform pap smear with HPV co-testing every 5 years.   Recommendations may differ for women with a history of total hysterectomy, cervical cancer, or abnormal pap smears in past. Pap Smear: 02/12/2024    Screening Not Indicated   Hepatitis C Screening Once for adults born between 1945 and 1965  More frequently in patients  at high risk for Hepatitis C Hep C Antibody: 12/09/2021    Screening Current   Diabetes Screening 1-2 times per year if you're at risk for diabetes or have pre-diabetes Fasting glucose: 84 mg/dL (5/3/2024)  A1C: No results in last 5 years (No results in last 5 years)  Screening Current   Cholesterol Screening Once every 5 years if you don't have a lipid disorder. May order more often based on risk factors. Lipid panel: 05/03/2024    Screening Not Indicated  History Lipid Disorder     Other Preventive Screenings Covered by Medicare:  Abdominal Aortic Aneurysm (AAA) Screening: covered once if your at risk. You're considered to be at risk if you have a family history of AAA.  Lung Cancer Screening: covers low dose CT scan once per year if you meet all of the following conditions: (1) Age 55-77; (2) No signs or symptoms of lung cancer; (3) Current smoker or have quit smoking within the last 15 years; (4) You have a tobacco smoking history of at least 20 pack years (packs per day multiplied by number of years you smoked); (5) You get a written order from a healthcare provider.  Glaucoma Screening: covered annually if you're considered high risk: (1) You have diabetes OR (2) Family history of glaucoma OR (3)  aged 50 and older OR (4)  American aged 65 and older  Osteoporosis Screening: covered every 2 years if you meet one of the following conditions: (1) You're estrogen deficient and at risk for osteoporosis based off medical history and other findings; (2) Have a vertebral abnormality; (3) On glucocorticoid therapy for more than 3 months; (4) Have primary hyperparathyroidism; (5) On osteoporosis medications and need to assess response to drug therapy.   Last bone density test (DXA Scan): 01/10/2023.  HIV Screening: covered annually if you're between the age of 15-65. Also covered annually if you are younger than 15 and older than 65 with risk factors for HIV infection. For pregnant patients, it  is covered up to 3 times per pregnancy.    Immunizations:  Immunization Recommendations   Influenza Vaccine Annual influenza vaccination during flu season is recommended for all persons aged >= 6 months who do not have contraindications   Pneumococcal Vaccine   * Pneumococcal conjugate vaccine = PCV13 (Prevnar 13), PCV15 (Vaxneuvance), PCV20 (Prevnar 20)  * Pneumococcal polysaccharide vaccine = PPSV23 (Pneumovax) Adults 19-63 yo with certain risk factors or if 65+ yo  If never received any pneumonia vaccine: recommend Prevnar 20 (PCV20)  Give PCV20 if previously received 1 dose of PCV13 or PPSV23   Hepatitis B Vaccine 3 dose series if at intermediate or high risk (ex: diabetes, end stage renal disease, liver disease)   Respiratory syncytial virus (RSV) Vaccine - COVERED BY MEDICARE PART D  * RSVPreF3 (Arexvy) CDC recommends that adults 60 years of age and older may receive a single dose of RSV vaccine using shared clinical decision-making (SCDM)   Tetanus (Td) Vaccine - COST NOT COVERED BY MEDICARE PART B Following completion of primary series, a booster dose should be given every 10 years to maintain immunity against tetanus. Td may also be given as tetanus wound prophylaxis.   Tdap Vaccine - COST NOT COVERED BY MEDICARE PART B Recommended at least once for all adults. For pregnant patients, recommended with each pregnancy.   Shingles Vaccine (Shingrix) - COST NOT COVERED BY MEDICARE PART B  2 shot series recommended in those 19 years and older who have or will have weakened immune systems or those 50 years and older     Health Maintenance Due:      Topic Date Due   • Colorectal Cancer Screening  05/23/2023   • Breast Cancer Screening: Mammogram  08/16/2024   • Hepatitis C Screening  Completed     Immunizations Due:      Topic Date Due   • COVID-19 Vaccine (5 - 2023-24 season) 09/01/2023     Advance Directives   What are advance directives?  Advance directives are legal documents that state your wishes and plans  for medical care. These plans are made ahead of time in case you lose your ability to make decisions for yourself. Advance directives can apply to any medical decision, such as the treatments you want, and if you want to donate organs.   What are the types of advance directives?  There are many types of advance directives, and each state has rules about how to use them. You may choose a combination of any of the following:  Living will:  This is a written record of the treatment you want. You can also choose which treatments you do not want, which to limit, and which to stop at a certain time. This includes surgery, medicine, IV fluid, and tube feedings.   Durable power of  for healthcare (DPAHC):  This is a written record that states who you want to make healthcare choices for you when you are unable to make them for yourself. This person, called a proxy, is usually a family member or a friend. You may choose more than 1 proxy.  Do not resuscitate (DNR) order:  A DNR order is used in case your heart stops beating or you stop breathing. It is a request not to have certain forms of treatment, such as CPR. A DNR order may be included in other types of advance directives.  Medical directive:  This covers the care that you want if you are in a coma, near death, or unable to make decisions for yourself. You can list the treatments you want for each condition. Treatment may include pain medicine, surgery, blood transfusions, dialysis, IV or tube feedings, and a ventilator (breathing machine).  Values history:  This document has questions about your views, beliefs, and how you feel and think about life. This information can help others choose the care that you would choose.  Why are advance directives important?  An advance directive helps you control your care. Although spoken wishes may be used, it is better to have your wishes written down. Spoken wishes can be misunderstood, or not followed. Treatments may be  given even if you do not want them. An advance directive may make it easier for your family to make difficult choices about your care.       © Copyright [employer] 2018 Information is for End User's use only and may not be sold, redistributed or otherwise used for commercial purposes. All illustrations and images included in CareNotes® are the copyrighted property of [email or web address] or [employer]

## 2024-05-08 NOTE — PROGRESS NOTES
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  Will continue with current hip plan, progressing well  Plan: Continue per plan of care        Precautions: standard       Manuals           L hip LAD sustained, w  harmonics DS AB          L hip lateral distraction belt belt -DS Belt - aB          MWM IR Belt, 10x  5x with CR end range hold Belt x15 2x10  AB          STM L glute   AB - w/ and w/o clam          Neuro Re-Ed           Self STM post glute HEP            SL bridge 3x5  3" hold  3x5  3" hold          Clam  LLE x20           Standing hip ABD (45 deg)  peach x20 ea GMB  3x8 ea side          HHR prn                                       Ther Ex             Seated glute max stretch             Bike 5' NV L1 5'                                                                                         Ther Activity                                       Gait Training                                       Modalities Clothing

## 2024-05-18 DIAGNOSIS — R21 RASH: Primary | ICD-10-CM

## 2024-05-20 RX ORDER — MOMETASONE FUROATE 1 MG/G
CREAM TOPICAL DAILY
Qty: 45 G | Refills: 1 | Status: SHIPPED | OUTPATIENT
Start: 2024-05-20

## 2024-08-21 ENCOUNTER — HOSPITAL ENCOUNTER (OUTPATIENT)
Dept: RADIOLOGY | Age: 69
Discharge: HOME | End: 2024-08-21
Payer: MEDICARE

## 2024-08-21 VITALS — BODY MASS INDEX: 20.66 KG/M2 | WEIGHT: 121 LBS | HEIGHT: 64 IN

## 2024-08-21 DIAGNOSIS — Z12.31 VISIT FOR SCREENING MAMMOGRAM: ICD-10-CM

## 2024-08-21 PROCEDURE — 77063 BREAST TOMOSYNTHESIS BI: CPT

## 2024-08-21 PROCEDURE — 77067 SCR MAMMO BI INCL CAD: CPT

## 2024-08-22 ENCOUNTER — ANCILLARY ORDERS (OUTPATIENT)
Dept: OBGYN CLINIC | Facility: CLINIC | Age: 69
End: 2024-08-22

## 2024-08-22 DIAGNOSIS — Z12.31 VISIT FOR SCREENING MAMMOGRAM: Primary | ICD-10-CM

## 2024-09-10 ENCOUNTER — OFFICE VISIT (OUTPATIENT)
Dept: PHYSICAL THERAPY | Facility: REHABILITATION | Age: 69
End: 2024-09-10
Payer: MEDICARE

## 2024-09-10 DIAGNOSIS — G89.29 NECK PAIN, CHRONIC: Primary | ICD-10-CM

## 2024-09-10 DIAGNOSIS — M54.2 NECK PAIN, CHRONIC: Primary | ICD-10-CM

## 2024-09-10 PROCEDURE — 97162 PT EVAL MOD COMPLEX 30 MIN: CPT | Performed by: PHYSICAL THERAPIST

## 2024-09-10 NOTE — PROGRESS NOTES
PT Evaluation     Today's date: 9/10/2024  Patient name: Fabby Rahman  : 1955  MRN: [id number]  Referring provider: Tuan Gutierrez, *  Dx:   Encounter Diagnosis     ICD-10-CM    1. Neck pain, chronic  M54.2     G89.29           Start Time: 1515  Stop Time: 1600  Total time in clinic (min): 45 minutes    Assessment  Impairments: abnormal muscle firing, abnormal muscle tone, abnormal or restricted ROM, abnormal movement, activity intolerance, impaired physical strength and pain with function    Assessment details: Fabby Rahman is a 68 y.o. female presenting to outpatient physical therapy on 09/10/24 with referral from DA for chronic R sided neck pain with mobility deficits MSI consistent with CS derangement, pain nociceptive. DP for Ext.  . Upon evaluation, Fabby demonstrates impaired . The listed impairments and functional limitation are effecting Fabby ability to function at prior level. They can continue to benefit from physical therapy services at this times in order to address the above discussed impairments and functional limitation in order to allow for a return to premorbid status       HEP: CS RET in sitting  Understanding of Dx/Px/POC: good     Prognosis: good    Plan  Patient would benefit from: skilled PT  Planned modality interventions: thermotherapy: hydrocollator packs    Planned therapy interventions: joint mobilization, manual therapy, ADL training, neuromuscular re-education, home exercise program, therapeutic exercise, therapeutic activities, strengthening, patient education and functional ROM exercises    Frequency: 2x week  Duration in weeks: 8  Treatment plan discussed with: patient        Subjective Evaluation    History of Present Illness  Mechanism of injury: Fabby is a 68 y.o. female presenting to physical therapy on 09/10/24 with referral from MD for R sided neck pain that began about 2 months ago of gradual onset. She denies any specific LUCIEN. Denies HA.     Bothered with  turning head is painful, felt with tennis, pickle ball, and golf (follow through).  Pain also felt with looking up and down.   R rotation is most painful.    Denies R UE symptoms with pain or numbness or tingle.     She will get relief with medication at times.   Pain is worse in the morning                Recurrent probem    Quality of life: good    Patient Goals  Patient goals for therapy: decreased pain, increased motion and return to sport/leisure activities    Pain  Current pain ratin  At worst pain ratin  Location: R sided neck - mid CS on R      Diagnostic Tests  No diagnostic tests performed  Treatments  Previous treatment: massage    Short Term Goals:   1. Patient will be Independent with hep  2. Patient will improve pain with activity by 50%  3. Patient will report GROC 50% or greater      Long Term Goals:   1. Patient will improve FOTO to greater then goal  2. Patient will improve pain with activity to 2/10 or less  3. Patient will continue with HEP independence to allow for decreased future reoccurrence of pain and loss in function  4. Patient will report GROC 75% or greater  5. Patient will have pain free CS AROM  6. Patient will return to golf tennis and pcike ball without neck pain.      Objective    Myotomes (L/R):normal UE  Dermatome: normal UE      Reflexes:  (L/R) C5-6: 2+ b/l   C5-6:   2+ b/l   C7:   1+ b/l      Scapular position:  Abduction Syndrome:      Downward Rotation Syndrome:      Depression Syndrome:      Palpation: TTP and + tone mid CS paraspinals C3-5 R     Cervical  % of normal   Flex. 50 P R   Extn. 50 P R   SB Left -   SB Right -   ROT Left 50   ROT Right 50 PP R   Repetitive testing:    Seated: RET = dec, B with R rot, CS ext        MMT         AROM          PROM    Shoulder       L       R        L           R      L     R   Flex.         Abd.         IR.         ER.                  Upper Trap         Mid Trap         Low Trap         Serratus              Rib  Assessment:  Rib 1: Elevated:  no  Hypomobile: no         Segmental mobility:   OAJ=   normal  AAJ=  NT   Mid CS=   hypomobile L SG C3-4     CTJ=  hypomobile  TS= hypomobile          Precautions: Standard       Manuals 9/10            STM R side CS paraspinals  AB            CS traciton with L SG G3                                      Neuro Re-Ed                                                                                                        Ther Ex 9/10            Seated RET 2x10            Supine RET 2x10                                                                                          Ther Activity                                       Gait Training                                       Modalities

## 2024-09-11 NOTE — DISCHARGE INSTRUCTIONS
Motrin 600mg and tylenol 650mg  every 6hrs as needed for pain Post-care instructions reviewed in detail. May apply Vaseline to crusted or scabbed areas. Medical Necessity Information: It is in your best interest to select a reason for this procedure from the list below. All of these items fulfill various CMS LCD requirements except the new and changing color options. Show Spray Paint Technique Variable?: Yes Render In Bullet Format When Appropriate: No Detail Level: Detailed Duration Of Freeze Thaw-Cycle (Seconds): 15 Number Of Freeze-Thaw Cycles: 1 freeze-thaw cycle Total Number Of Lesions Treated: 13 Spray Paint Text: The liquid nitrogen was applied to the skin utilizing a spray paint frosting technique. Consent: Informed consent obtained including but not limited to risks of pain, blistering, possibly permanent pigmentary change, recurrence and incomplete removal. If lesion(s) remain in 1 month, contact office for further evaluation and treatment. Medical Necessity Clause: Medical necessity:

## 2024-09-18 ENCOUNTER — OFFICE VISIT (OUTPATIENT)
Dept: PHYSICAL THERAPY | Facility: REHABILITATION | Age: 69
End: 2024-09-18
Payer: MEDICARE

## 2024-09-18 DIAGNOSIS — G89.29 NECK PAIN, CHRONIC: Primary | ICD-10-CM

## 2024-09-18 DIAGNOSIS — M54.2 NECK PAIN, CHRONIC: Primary | ICD-10-CM

## 2024-09-18 PROCEDURE — 97140 MANUAL THERAPY 1/> REGIONS: CPT | Performed by: PHYSICAL THERAPIST

## 2024-09-18 NOTE — PROGRESS NOTES
Daily Note     Today's date: 2024  Patient name: Fabby Rahman  : 1955  MRN: [id number]  Referring provider: Tuan Gutierrez, *  Dx:   Encounter Diagnosis     ICD-10-CM    1. Neck pain, chronic  M54.2     G89.29           Start Time: 1500  Stop Time: 1545  Total time in clinic (min): 45 minutes    Subjective: Patient reports that she continues with R sided neck pain that is worst with R rotation. Has increased her frequency with RET but no change in pain as she did with her L side.       Objective: See treatment diary below      Assessment: Tolerated treatment well. Patient  reports less R sided neck pain with rot, ext and flexion post STM and JM. Overall she did well with treatment as HEP was updated to work in SNAGs to R, holding on RET.       Plan: Continue per plan of care.      Precautions: Standard       Manuals 9/10 9/18           STM R side CS paraspinals  AB AB           CS traciton with L SG G3 AB  G3/4           CS dist  AB           STM levator with PROM and AROM R rot  Redcord used-5 rds           CS R rot mob  AB - mid CS G3           Neuro Re-Ed                                                                                                        Ther Ex 9/10            Seated RET 2x10            Supine RET 2x10            SNAG R  6x                                                                            Ther Activity                                       Gait Training                                       Modalities

## 2024-09-19 ENCOUNTER — APPOINTMENT (OUTPATIENT)
Dept: PHYSICAL THERAPY | Facility: REHABILITATION | Age: 69
End: 2024-09-19
Payer: MEDICARE

## 2024-10-02 ENCOUNTER — OFFICE VISIT (OUTPATIENT)
Dept: PHYSICAL THERAPY | Facility: REHABILITATION | Age: 69
End: 2024-10-02
Payer: MEDICARE

## 2024-10-02 DIAGNOSIS — M54.2 NECK PAIN, CHRONIC: Primary | ICD-10-CM

## 2024-10-02 DIAGNOSIS — G89.29 NECK PAIN, CHRONIC: Primary | ICD-10-CM

## 2024-10-02 PROCEDURE — 97140 MANUAL THERAPY 1/> REGIONS: CPT | Performed by: PHYSICAL THERAPIST

## 2024-10-02 PROCEDURE — 97112 NEUROMUSCULAR REEDUCATION: CPT | Performed by: PHYSICAL THERAPIST

## 2024-10-02 NOTE — PROGRESS NOTES
"Daily Note     Today's date: 10/2/2024  Patient name: Fabby Rahman  : 1955  MRN: [id number]  Referring provider: Tuan Gutierrez, *  Dx:   Encounter Diagnosis     ICD-10-CM    1. Neck pain, chronic  M54.2     G89.29           Start Time: 1425  Stop Time: 1510  Total time in clinic (min): 45 minutes    Subjective: Patient reports feeling about the same since last visit. She was on vacation and did not have a chance to do much with HEP. Flying and sleeping in hotels did not help.      Objective: See treatment diary below  Hypomobile C3-4 L SG  Tone increase and TTP along CS paraspinals R    Assessment: Tolerated treatment well. Patient with improve R rot with combine CS RET and R rot. HEP updated to work this in.       Plan: Continue per plan of care.      Precautions: Standard       Manuals 9/10 9/18 10          STM R side CS paraspinals  AB AB AB          CS traciton with L SG G3 AB  G3/4 AB G3-4          CS dist  AB AB          STM levator with PROM and AROM R rot  Redcord used-5 rds With flexion of CS          CS R rot mob  AB - mid CS G3 AB mid CS G3          Neuro Re-Ed             DNF   5\"x5          RET + rot with hold   10x          RET on redcord   10x                                                              Ther Ex 9/10  10/2          Seated RET 2x10            Supine RET 2x10            SNAG R  6x                                                                            Ther Activity                                       Gait Training                                       Modalities                                              "

## 2024-10-09 ENCOUNTER — OFFICE VISIT (OUTPATIENT)
Dept: PHYSICAL THERAPY | Facility: REHABILITATION | Age: 69
End: 2024-10-09
Payer: MEDICARE

## 2024-10-09 DIAGNOSIS — G89.29 NECK PAIN, CHRONIC: Primary | ICD-10-CM

## 2024-10-09 DIAGNOSIS — M54.2 NECK PAIN, CHRONIC: Primary | ICD-10-CM

## 2024-10-09 PROCEDURE — 97112 NEUROMUSCULAR REEDUCATION: CPT | Performed by: PHYSICAL THERAPIST

## 2024-10-09 PROCEDURE — 97140 MANUAL THERAPY 1/> REGIONS: CPT | Performed by: PHYSICAL THERAPIST

## 2024-10-09 NOTE — PROGRESS NOTES
"Daily Note     Today's date: 10/9/2024  Patient name: Fabby Rahman  : 1955  MRN: [id number]  Referring provider: Tuan Gutierrez, *  Dx:   Encounter Diagnosis     ICD-10-CM    1. Neck pain, chronic  M54.2     G89.29           Start Time: 1415  Stop Time: 1500  Total time in clinic (min): 45 minutes    Subjective: Patient reports that she has been taking pain medication (anti inflammatory) for a tooth issue which is helping with her neck pain. Still feeling r sided neck pain with turning her head to the right, it is improved with added RET prior to rot.       Objective: See treatment diary below      Assessment: Tolerated treatment well. Patient educated on trying towel roll at night behind her neck when sleeping. She will continue with HEP.       Plan: Continue per plan of care.      Precautions: Standard       Manuals 9/10 9/18 10/2 10/9         STM R side CS paraspinals  AB AB AB AB         CS traciton with L SG G3 AB  G3/4 AB G3-4 AB g3-4         CS dist  AB AB          STM levator with PROM and AROM R rot  Redcord used-5 rds With flexion of CS With flexion of CS         CS R rot mob  AB - mid CS G3 AB mid CS G3 AB mid CS G3         Neuro Re-Ed             DNF   5\"x5 5\"x5         RET + rot with hold   10x 10         RET on redcord   10x 10x                                                             Ther Ex 9/10  10/2          Seated RET 2x10            Supine RET 2x10            SNAG R  6x                                                                            Ther Activity                                       Gait Training                                       Modalities                                                "

## 2024-10-16 ENCOUNTER — OFFICE VISIT (OUTPATIENT)
Dept: PHYSICAL THERAPY | Facility: REHABILITATION | Age: 69
End: 2024-10-16
Payer: MEDICARE

## 2024-10-16 DIAGNOSIS — G89.29 NECK PAIN, CHRONIC: Primary | ICD-10-CM

## 2024-10-16 DIAGNOSIS — M54.2 NECK PAIN, CHRONIC: Primary | ICD-10-CM

## 2024-10-16 PROCEDURE — 97112 NEUROMUSCULAR REEDUCATION: CPT | Performed by: PHYSICAL THERAPIST

## 2024-10-16 PROCEDURE — 97140 MANUAL THERAPY 1/> REGIONS: CPT | Performed by: PHYSICAL THERAPIST

## 2024-10-16 NOTE — PROGRESS NOTES
"Daily Note     Today's date: 10/16/2024  Patient name: Fabby Rahman  : 1955  MRN: [id number]  Referring provider: Tuan Gutierrez, *  Dx:   Encounter Diagnosis     ICD-10-CM    1. Neck pain, chronic  M54.2     G89.29           Start Time: 1250  Stop Time: 1330  Total time in clinic (min): 40 minutes    Subjective: Patient reports no new updates at this time. Continues with R sided neck pain that is worst in th morning, improves throughout the day and improves with exercise but not lasting.       Objective: See treatment diary below      Assessment: Tolerated treatment well. Patient with improve pain with CS rotation post session. She has a positive response to RET + rot vs rot alone. Educated on performing (in this order) RET, RET + rot and RET + rot with OP 2-4x/day.      Plan: Continue per plan of care.      Precautions: Standard       Manuals 9/10 9/18 10/2 10/9 10/16        STM R side CS paraspinals  AB AB AB AB AB        CS traciton with L SG G3 AB  G3/4 AB G3-4 AB g3-4 AB G3        CS dist  AB AB          C1 MET - R SB     AB s rds        STM levator with PROM and AROM R rot  Redcord used-5 rds With flexion of CS With flexion of CS SOR        CS R rot mob  AB - mid CS G3 AB mid CS G3 AB mid CS G3 L - AB mid CS G3        Neuro Re-Ed             DNF   5\"x5 5\"x5         RET + rot with hold   10x 10 2 rds on redcord        RET on redcord   10x 10x                                                             Ther Ex 9/10  10/2          Seated RET 2x10    + rot and OP with towel - 5x        Supine RET 2x10            SNAG R  6x                                                                            Ther Activity                                       Gait Training                                       Modalities                                                  "

## 2024-10-23 ENCOUNTER — APPOINTMENT (OUTPATIENT)
Dept: PHYSICAL THERAPY | Facility: REHABILITATION | Age: 69
End: 2024-10-23
Payer: MEDICARE

## 2024-10-24 ENCOUNTER — APPOINTMENT (OUTPATIENT)
Dept: PHYSICAL THERAPY | Facility: REHABILITATION | Age: 69
End: 2024-10-24
Payer: MEDICARE

## 2024-10-28 ENCOUNTER — OFFICE VISIT (OUTPATIENT)
Dept: PHYSICAL THERAPY | Facility: REHABILITATION | Age: 69
End: 2024-10-28
Payer: MEDICARE

## 2024-10-28 DIAGNOSIS — G89.29 NECK PAIN, CHRONIC: Primary | ICD-10-CM

## 2024-10-28 DIAGNOSIS — M54.2 NECK PAIN, CHRONIC: Primary | ICD-10-CM

## 2024-10-28 PROCEDURE — 97140 MANUAL THERAPY 1/> REGIONS: CPT | Performed by: PHYSICAL THERAPIST

## 2024-10-28 PROCEDURE — 97110 THERAPEUTIC EXERCISES: CPT | Performed by: PHYSICAL THERAPIST

## 2024-10-28 NOTE — PROGRESS NOTES
"Daily Note     Today's date: 10/28/2024  Patient name: Fabby Rahman  : 1955  MRN: [id number]  Referring provider: Tuan Gutierrez, *  Dx:   Encounter Diagnosis     ICD-10-CM    1. Neck pain, chronic  M54.2     G89.29           Start Time: 1530  Stop Time: 1610  Total time in clinic (min): 40 minutes    Subjective: Patient reports no new updates at this time. Continues with R sided neck pain that is worst in th morning, improves throughout the day and improves with exercise but not lasting.       Objective: See treatment diary below      Assessment: Tolerated treatment well. Patient with less pain post session when perform  rot. Pleased with her progress within session. Due to not having great carryover with exercises, further HEP provided with self sub occipital release and CTJ mob to compliment R rot SNAG.      Plan: Continue per plan of care.      Precautions: Standard       Manuals 9/10 9/18 10/2 10/9 10/16        STM R side CS paraspinals  AB AB AB AB AB        CS traciton with L SG G3 AB  G3/4 AB G3-4 AB g3-4 AB G3        CS dist  AB AB          C1 MET - R SB             STM levator with PROM and AROM R rot  Redcord used-5 rds With flexion of CS With flexion of CS SOR R        CS R rot mob  AB - mid CS G3 AB mid CS G3 AB mid CS G3 L - AB mid CS G3        Neuro Re-Ed             DNF   5\"x5 5\"x5         RET + rot with hold   10x 10         RET on redcord   10x 10x                                                             Ther Ex 9/10  10/2          Seated RET 2x10            Supine RET 2x10            SNAG R  6x           Self STM sub occ     2'        education     8'                                               Ther Activity                                       Gait Training                                       Modalities                                                  "

## 2024-11-12 DIAGNOSIS — F41.9 ANXIETY: ICD-10-CM

## 2024-11-12 RX ORDER — ALPRAZOLAM 0.25 MG/1
0.25 TABLET ORAL
Qty: 30 TABLET | Refills: 0 | Status: SHIPPED | OUTPATIENT
Start: 2024-11-12

## 2024-11-18 ENCOUNTER — OFFICE VISIT (OUTPATIENT)
Dept: PHYSICAL THERAPY | Facility: REHABILITATION | Age: 69
End: 2024-11-18
Payer: MEDICARE

## 2024-11-18 DIAGNOSIS — G89.29 NECK PAIN, CHRONIC: Primary | ICD-10-CM

## 2024-11-18 DIAGNOSIS — M54.2 NECK PAIN, CHRONIC: Primary | ICD-10-CM

## 2024-11-18 PROCEDURE — 97140 MANUAL THERAPY 1/> REGIONS: CPT | Performed by: PHYSICAL THERAPIST

## 2024-11-18 PROCEDURE — 97110 THERAPEUTIC EXERCISES: CPT | Performed by: PHYSICAL THERAPIST

## 2024-11-18 NOTE — PROGRESS NOTES
"Daily Note     Today's date: 2024  Patient name: Fabby Rahman  : 1955  MRN: [id number]  Referring provider: Tuan Gutierrez, *  Dx:   Encounter Diagnosis     ICD-10-CM    1. Neck pain, chronic  M54.2     G89.29           Start Time: 1445  Stop Time: 1530  Total time in clinic (min): 45 minutes    Subjective: Patient reports that she feels better over the last month. Has been more diligent with exercises and feels that the strap for stretching her neck is much more effective then a towel.       Objective: See treatment diary below      Assessment: Tolerated treatment well. Patient with improved CS lateral glide post MT. She had good recall of her exercises today, I feel that her increased frequency played a positive role in mobility and pain.      Plan: Continue per plan of care.      Precautions: Standard       Manuals 9/10 9/18 10/2 10/9 10/16 11/18       STM R side CS paraspinals  AB AB AB AB AB AB       CS traciton with L SG G3 AB  G3/4 AB G3-4 AB g3-4 AB G3 AB G3       CS dist  AB AB          C1 MET - R SB             STM levator with PROM and AROM R rot  Redcord used-5 rds With flexion of CS With flexion of CS SOR R SOR R       CS R rot mob  AB - mid CS G3 AB mid CS G3 AB mid CS G3 L - AB mid CS G3 NP       Neuro Re-Ed             DNF   5\"x5 5\"x5         RET + rot with hold   10x 10         RET on redcord   10x 10x                                                             Ther Ex 9/10  10/2          Seated RET 2x10            Supine RET 2x10            SNAG R  6x           Self STM sub occ     2'        education     8' 10'                                              Ther Activity                                       Gait Training                                       Modalities                                                    "

## 2024-11-25 ENCOUNTER — OFFICE VISIT (OUTPATIENT)
Dept: PHYSICAL THERAPY | Facility: REHABILITATION | Age: 69
End: 2024-11-25
Payer: MEDICARE

## 2024-11-25 DIAGNOSIS — M54.2 NECK PAIN, CHRONIC: Primary | ICD-10-CM

## 2024-11-25 DIAGNOSIS — G89.29 NECK PAIN, CHRONIC: Primary | ICD-10-CM

## 2024-11-25 PROCEDURE — 97140 MANUAL THERAPY 1/> REGIONS: CPT | Performed by: PHYSICAL THERAPIST

## 2024-11-25 NOTE — PROGRESS NOTES
"Daily Note     Today's date: 2024  Patient name: Fabby Rahman  : 1955  MRN: [id number]  Referring provider: Tuan Gutierrez, *  Dx:   Encounter Diagnosis     ICD-10-CM    1. Neck pain, chronic  M54.2     G89.29           Start Time: 1530  Stop Time: 1615  Total time in clinic (min): 45 minutes    Subjective: Patient reports that she does continue to feel improvement with pain and mobility. If talking to someone on her right for longer she will need to turn her body due to pain.      Objective: See treatment diary below      Assessment: Tolerated treatment well. Patient with improved L SG post mobilization. Patient educated on continued HEP.       Plan: Continue per plan of care.      Precautions: Standard       Manuals 9/10 9/18 10/2 10/9 10/16 11/18 11/25      STM R side CS paraspinals  AB AB AB AB AB AB AB      CS traciton with L SG G3 AB  G3/4 AB G3-4 AB g3-4 AB G3 AB G3 AB G3      CS dist  AB AB          C1 MET - R SB             STM levator with PROM and AROM R rot  Redcord used-5 rds With flexion of CS With flexion of CS SOR R SOR R SOR R      CS R rot mob  AB - mid CS G3 AB mid CS G3 AB mid CS G3 L - AB mid CS G3 NP L SG (with distraction)      Neuro Re-Ed             DNF   5\"x5 5\"x5         RET + rot with hold   10x 10         RET on redcord   10x 10x                                                             Ther Ex 9/10  10          Seated RET 2x10            Supine RET 2x10            SNAG R  6x           Self STM sub occ     2'        education     8' 10'                                              Ther Activity                                       Gait Training                                       Modalities                                                      "

## 2024-12-02 ENCOUNTER — OFFICE VISIT (OUTPATIENT)
Dept: PHYSICAL THERAPY | Facility: REHABILITATION | Age: 69
End: 2024-12-02
Payer: MEDICARE

## 2024-12-02 DIAGNOSIS — M54.2 NECK PAIN, CHRONIC: Primary | ICD-10-CM

## 2024-12-02 DIAGNOSIS — G89.29 NECK PAIN, CHRONIC: Primary | ICD-10-CM

## 2024-12-02 PROCEDURE — 97140 MANUAL THERAPY 1/> REGIONS: CPT | Performed by: PHYSICAL THERAPIST

## 2024-12-02 NOTE — PROGRESS NOTES
"Daily Note     Today's date: 2024  Patient name: Fabby Rahman  : 1955  MRN: [id number]  Referring provider: Tuan Gutierrez, *  Dx:   Encounter Diagnosis     ICD-10-CM    1. Neck pain, chronic  M54.2     G89.29           Start Time: 1530  Stop Time: 1610  Total time in clinic (min): 40 minutes    Subjective: Patient reports 90% improvement at this time, noted greater ease turning when driving.       Objective: See treatment diary below   Hypomobile mid CS    Assessment: Tolerated treatment well. Patient  overall with good CS AROM, ERP R rot and flexion with reports of mild improvement post session.       Plan: Continue per plan of care.      Precautions: Standard       Manuals 9/10 9/18 10/2 10/9 10/16 11/18 11/25 12/2     STM R side CS paraspinals  AB AB AB AB AB AB AB      CS traciton with L SG G3 AB  G3/4 AB G3-4 AB g3-4 AB G3 AB G3 AB G3 AB G3     CS dist  AB AB          C1 MET - R SB             STM levator with PROM and AROM R rot  Redcord used-5 rds With flexion of CS With flexion of CS SOR R SOR R SOR R SOR R     CS R rot mob  AB - mid CS G3 AB mid CS G3 AB mid CS G3 L - AB mid CS G3 NP L SG (with distraction) L SG(with distraction)     Mid CS AP mob with flexion        AB     Neuro Re-Ed             DNF   5\"x5 5\"x5         RET + rot with hold   10x 10         RET on redcord   10x 10x                                                             Ther Ex 9/10  10/2          Seated RET 2x10            Supine RET 2x10            SNAG R  6x           Self STM sub occ     2'        education     8' 10'                                              Ther Activity                                       Gait Training                                       Modalities                                                        "

## 2024-12-16 ENCOUNTER — OFFICE VISIT (OUTPATIENT)
Dept: PHYSICAL THERAPY | Facility: REHABILITATION | Age: 69
End: 2024-12-16
Payer: MEDICARE

## 2024-12-16 DIAGNOSIS — G89.29 NECK PAIN, CHRONIC: Primary | ICD-10-CM

## 2024-12-16 DIAGNOSIS — M54.2 NECK PAIN, CHRONIC: Primary | ICD-10-CM

## 2024-12-16 PROCEDURE — 97140 MANUAL THERAPY 1/> REGIONS: CPT | Performed by: PHYSICAL THERAPIST

## 2024-12-16 PROCEDURE — 97110 THERAPEUTIC EXERCISES: CPT | Performed by: PHYSICAL THERAPIST

## 2024-12-16 NOTE — PROGRESS NOTES
"Daily Note     Today's date: 2024  Patient name: Fabby Rahman  : 1955  MRN: [id number]  Referring provider: Tuan Gutierrez, *  Dx:   Encounter Diagnosis     ICD-10-CM    1. Neck pain, chronic  M54.2     G89.29           Start Time: 1520  Stop Time: 1600  Total time in clinic (min): 40 minutes    Subjective: Patient reports that she does not feel quite as good as last week. She did misplace her mobilization strap for a few days and was looking down more with wrapping gifts.       Objective: See treatment diary below      Assessment: Tolerated treatment well. Patient reports less strain with CS R rot post session. Worked in cupping today for improving soft tissue tension along R side paraspinals. Reports much better pain with rotation post cupping.       Plan: Continue per plan of care.      Precautions: Standard       Manuals 9/10 9/18 10/2 10/9 10/16 11/18 11/25 12/2 12/16    STM R side CS paraspinals  AB AB AB AB AB AB AB      CS traciton with L SG G3 AB  G3/4 AB G3-4 AB g3-4 AB G3 AB G3 AB G3 AB G3 AB G3    CS dist  AB AB          C1 MET - R SB             STM levator with PROM and AROM R rot  Redcord used-5 rds With flexion of CS With flexion of CS SOR R SOR R SOR R SOR R SOR R    CS R rot mob  AB - mid CS G3 AB mid CS G3 AB mid CS G3 L - AB mid CS G3 NP L SG (with distraction) L SG(with distraction) L SG with distraction    Cupping          CS paraspinals R-static and with rotation    Mid CS AP mob with flexion        AB     Neuro Re-Ed             DNF   5\"x5 5\"x5         RET + rot with hold   10x 10         RET on redcord   10x 10x                                                             Ther Ex 9/10  10/2          Seated RET 2x10        10x  RET+ext 5x (2 sets)    Supine RET 2x10            SNAG R  6x           Self STM sub occ     2'        education     8' 10'   5x                                           Ther Activity                                       Gait Training           "                             Modalities

## 2024-12-30 ENCOUNTER — OFFICE VISIT (OUTPATIENT)
Dept: PHYSICAL THERAPY | Facility: REHABILITATION | Age: 69
End: 2024-12-30
Payer: MEDICARE

## 2024-12-30 DIAGNOSIS — G89.29 NECK PAIN, CHRONIC: Primary | ICD-10-CM

## 2024-12-30 DIAGNOSIS — M54.2 NECK PAIN, CHRONIC: Primary | ICD-10-CM

## 2024-12-30 PROCEDURE — 97140 MANUAL THERAPY 1/> REGIONS: CPT | Performed by: PHYSICAL THERAPIST

## 2024-12-30 NOTE — PROGRESS NOTES
"Daily Note     Today's date: 2024  Patient name: Fabby Rahman  : 1955  MRN: [id number]  Referring provider: Tuan Gutierrez, *  Dx:   Encounter Diagnosis     ICD-10-CM    1. Neck pain, chronic  M54.2     G89.29           Start Time: 1535  Stop Time: 1615  Total time in clinic (min): 40 minutes    Subjective: Patient reports feeling about 90% better. Did have some pain lookig to R for a prolonged time the other night but nothing lasting.       Objective: See treatment diary below      Assessment: Tolerated treatment well. Patient does have improving CS mobility in mid CS joints and soft tissue but still with some mild deficits as she benefits from MT. She had pulling along R side of neck with R rot and flexion pre tx with abolished rot pain and mild flexion pain post MT.      Plan: Continue per plan of care.      Precautions: Standard       Manuals 9/10 9/18 10/2 10/9 10/16 11/18 11/25 12/2 12/16 12/30   STM superior scap          AB   STM R side CS paraspinals  AB AB AB AB AB AB AB   AB   CS traciton with L SG G3 AB  G3/4 AB G3-4 AB g3-4 AB G3 AB G3 AB G3 AB G3 AB G3 AB G3   CS dist  AB AB          C1 MET - R SB             STM levator with PROM and AROM R rot  Redcord used-5 rds With flexion of CS With flexion of CS SOR R SOR R SOR R SOR R SOR R SOR R   CS R rot mob  AB - mid CS G3 AB mid CS G3 AB mid CS G3 L - AB mid CS G3 NP L SG (with distraction) L SG(with distraction) L SG with distraction L SG with distraction   Cupping          CS paraspinals R-static and with rotation NP   Mid CS AP mob with flexion        AB     Neuro Re-Ed             DNF   5\"x5 5\"x5         RET + rot with hold   10x 10         RET on redcord   10x 10x                                                             Ther Ex 9/10  10/2          Seated RET 2x10        10x  RET+ext 5x (2 sets)    Supine RET 2x10            SNAG R  6x           Self STM sub occ     2'        education     8' 10'   5x                          "                  Ther Activity                                       Gait Training                                       Modalities

## 2024-12-30 NOTE — PROGRESS NOTES
[facility] INTERNAL MEDICINE- [place]  OFFICE VISIT     PATIENT INFORMATION     Fabby Rahman   69 y.o. female   MRN: [id number]    ASSESSMENT/PLAN     Assessment & Plan  Other infective acute otitis externa of left ear  Patient presents to clinic today for an acute visit with concern of a possible boil in her left ear for which she has noticed over the past week.  Patient states that she has noticed a bump on the outer portion of her ear canal.  Her  has noticed this as well.  It has had some pus and bleeding.    She has tried treating it with peroxide and antibacterial ointment.  Patient believes that it was getting a bit better over the past few days with these treatments but has persisted so she wanted get it checked.    Exam consistent with abrasion and left ear canal with dried discharge/blood and associated otitis externa.    Patient states that she has been using earplugs consistently for sleep and is also been using an ear headphones consistently during the day as well.  This likely caused abrasion and ear which is turned to infection.    Will treat with Ciprodex drops twice daily x 7 days.  Orders:    ciprofloxacin-dexamethasone (CIPRODEX) otic suspension; Administer 4 drops into the left ear 2 (two) times a day for 7 days         HEALTH MAINTENANCE     Immunization History   Administered Date(s) Administered    COVID-19 MODERNA VACC 0.25 ML IM BOOSTER 01/05/2022    COVID-19 MODERNA VACC 0.5 ML IM 01/23/2021, 02/20/2021, 01/05/2022    Hep A, adult 07/23/2004, 01/28/2005    INFLUENZA 09/23/2013, 11/22/2014, 11/28/2015, 11/28/2015, 09/29/2016, 09/29/2016, 10/16/2017, 10/17/2018    Influenza Quadrivalent, 6-35 Months IM 09/29/2016, 10/16/2017    Influenza, recombinant, quadrivalent,injectable, preservative free 11/30/2021    Influenza, seasonal, injectable 11/15/2006, 12/05/2007, 09/23/2013    Td (adult), adsorbed 06/25/2007    Tdap 02/12/2001, 11/07/2013, 05/01/2023    Zoster Vaccine Recombinant  03/30/2021, 08/12/2021         CHIEF COMPLAINT     Chief Complaint   Patient presents with    ear boil     Started 1 week ago   Tried peroxide and alcohol and mupirocin       HISTORY OF PRESENT ILLNESS     Ms. Fabby Rahman is a 69-year-old female with past medical history of osteopenia, eczema, anxiety, insomnia, hyperlipidemia.  Patient presents to clinic today for an acute visit with concern of a possible boil in her left ear for which she has noticed over the past week.  Patient states that she has noticed a bump on the outer portion of her ear canal.  Her  has noticed this as well.  It has had some pus and bleeding.  She has tried treating it with peroxide and antibacterial ointment.  Patient believes that it was getting a bit better over the past few days with these treatments but has persisted so she wanted get it checked.  Exam consistent with abrasion and left ear canal with dried discharge/blood and associated otitis externa.  Patient states that she has been using earplugs consistently for sleep and is also been using an ear headphones consistently during the day as well.  This likely caused abrasion and ear which is turned to infection.  She denies fever, chills, nausea, vomiting, diarrhea, constipation with chest pain, shortness of breath.  Denies any changes in hearing, blurry vision, sore throat, congestion.    REVIEW OF SYSTEMS     Review of Systems   Constitutional:  Negative for chills and fever.   HENT:  Positive for ear discharge and ear pain. Negative for congestion, rhinorrhea and sore throat.    Eyes:  Negative for pain and redness.   Respiratory:  Negative for cough, shortness of breath and wheezing.    Cardiovascular:  Negative for chest pain and leg swelling.   Gastrointestinal:  Negative for abdominal distention, abdominal pain, constipation, diarrhea, nausea and vomiting.   Genitourinary:  Negative for difficulty urinating and dysuria.   Musculoskeletal:  Negative for arthralgias  "and back pain.   Skin:  Negative for rash and wound.   Neurological:  Negative for dizziness, syncope, weakness, light-headedness and headaches.   Psychiatric/Behavioral:  Negative for agitation, behavioral problems and confusion.      OBJECTIVE     Vitals:    12/31/24 0940   BP: 112/68   BP Location: Left arm   Patient Position: Sitting   Pulse: 66   Temp: 97.6 °F (36.4 °C)   TempSrc: Tympanic   SpO2: 94%   Weight: 53.8 kg (118 lb 9.6 oz)   Height: 5' 4.5\" (1.638 m)     Physical Exam  Constitutional:       Appearance: Normal appearance.   HENT:      Right Ear: Hearing, tympanic membrane, ear canal and external ear normal.      Left Ear: Hearing, tympanic membrane and external ear normal. Laceration and tenderness present. There is no impacted cerumen. No foreign body.      Mouth/Throat:      Mouth: Mucous membranes are moist.      Pharynx: Oropharynx is clear. No oropharyngeal exudate or posterior oropharyngeal erythema.   Eyes:      Extraocular Movements: Extraocular movements intact.      Conjunctiva/sclera: Conjunctivae normal.      Pupils: Pupils are equal, round, and reactive to light.   Cardiovascular:      Rate and Rhythm: Normal rate and regular rhythm.      Pulses: Normal pulses.      Heart sounds: Normal heart sounds. No murmur heard.  Pulmonary:      Effort: Pulmonary effort is normal. No respiratory distress.      Breath sounds: Normal breath sounds. No wheezing, rhonchi or rales.   Abdominal:      General: Abdomen is flat. Bowel sounds are normal. There is no distension.      Palpations: Abdomen is soft.      Tenderness: There is no abdominal tenderness.   Musculoskeletal:      Right lower leg: No edema.      Left lower leg: No edema.   Skin:     General: Skin is warm and dry.   Neurological:      Mental Status: She is alert and oriented to person, place, and time.   Psychiatric:         Mood and Affect: Mood normal.         Behavior: Behavior normal.         Thought Content: Thought content normal. "       CURRENT MEDICATIONS     Current Outpatient Medications:     ALPRAZolam (XANAX) 0.25 mg tablet, Take 1 tablet (0.25 mg total) by mouth daily at bedtime as needed for anxiety, Disp: 30 tablet, Rfl: 0    Calcium Carbonate-Vit D-Min (CALCIUM 1200 PO), Take by mouth, Disp: , Rfl:     Cholecalciferol 1000 units capsule, Take 2,000 Units by mouth daily, Disp: , Rfl:     ciprofloxacin-dexamethasone (CIPRODEX) otic suspension, Administer 4 drops into the left ear 2 (two) times a day for 7 days, Disp: 7.5 mL, Rfl: 0    MAGNESIUM PO, Take by mouth, Disp: , Rfl:     mometasone (ELOCON) 0.1 % cream, Apply topically daily, Disp: 45 g, Rfl: 1    multivitamin (THERAGRAN) TABS, Take 1 tablet by mouth daily, Disp: , Rfl:     Omega-3 Fatty Acids (FISH OIL PO), Take 2 g by mouth, Disp: , Rfl:     TURMERIC CURCUMIN PO, Take by mouth, Disp: , Rfl:     VITAMIN C, CALCIUM ASCORBATE, PO, Take by mouth, Disp: , Rfl:     PAST MEDICAL & SURGICAL HISTORY     Past Medical History:   Diagnosis Date    Allergic contact dermatitis     Allergic to insect bites and stings     Conjunctivitis     Herpes simplex infection     last assessed: 2013    Laryngitis     Pharyngitis     Pneumonia      Past Surgical History:   Procedure Laterality Date     SECTION      MOUTH SURGERY       SOCIAL & FAMILY HISTORY     Social History     Socioeconomic History    Marital status: /Civil Union     Spouse name: Not on file    Number of children: Not on file    Years of education: Not on file    Highest education level: Not on file   Occupational History    Not on file   Tobacco Use    Smoking status: Never    Smokeless tobacco: Never   Vaping Use    Vaping status: Never Used   Substance and Sexual Activity    Alcohol use: Yes     Comment: soc    Drug use: No    Sexual activity: Yes     Partners: Male     Birth control/protection: Post-menopausal   Other Topics Concern    Not on file   Social History Narrative    Daily coffee consumption - 2  cups a day     Exercising regularly      Social Drivers of Health     Financial Resource Strain: Low Risk  (5/1/2023)    Overall Financial Resource Strain (CARDIA)     Difficulty of Paying Living Expenses: Not hard at all   Food Insecurity: No Food Insecurity (5/6/2024)    Nursing - Inadequate Food Risk Classification     Worried About Running Out of Food in the Last Year: Never true     Ran Out of Food in the Last Year: Never true     Ran Out of Food in the Last Year: Not on file   Transportation Needs: No Transportation Needs (5/6/2024)    PRAPARE - Transportation     Lack of Transportation (Medical): No     Lack of Transportation (Non-Medical): No   Physical Activity: Not on file   Stress: Not on file   Social Connections: Not on file   Intimate Partner Violence: Not on file   Housing Stability: Unknown (5/6/2024)    Housing Stability Vital Sign     Unable to Pay for Housing in the Last Year: No     Number of Times Moved in the Last Year: Not on file     Homeless in the Last Year: No     Social History     Substance and Sexual Activity   Alcohol Use Yes    Comment: soc       Social History     Substance and Sexual Activity   Drug Use No     Social History     Tobacco Use   Smoking Status Never   Smokeless Tobacco Never     Family History   Problem Relation Age of Onset    Glaucoma Mother     Heart disease Mother     Hypertension Mother     Parkinsonism Father     No Known Problems Sister     No Known Problems Daughter     Heart disease Maternal Grandmother     No Known Problems Maternal Grandfather     Cancer Paternal Grandmother         unsure of type--in digestive tract    No Known Problems Paternal Grandfather     No Known Problems Brother     Melanoma Brother     No Known Problems Son     Lung cancer Maternal Aunt     No Known Problems Paternal Aunt     No Known Problems Paternal Aunt     No Known Problems Paternal Aunt     No Known Problems Paternal Aunt     No Known Problems Paternal Aunt     Atrial  fibrillation Family     Cancer Family     Heart disease Family     Hypertension Family     Parkinsonism Family      ==  Eder Trinh DO  [facility]  [address] [place] [address] [address]  [place] [address]  Office: [phone]  Fax: [phone]

## 2024-12-31 ENCOUNTER — OFFICE VISIT (OUTPATIENT)
Age: 69
End: 2024-12-31
Payer: MEDICARE

## 2024-12-31 VITALS
WEIGHT: 118.6 LBS | DIASTOLIC BLOOD PRESSURE: 68 MMHG | TEMPERATURE: 97.6 F | BODY MASS INDEX: 19.76 KG/M2 | HEART RATE: 66 BPM | SYSTOLIC BLOOD PRESSURE: 112 MMHG | OXYGEN SATURATION: 94 % | HEIGHT: 65 IN

## 2024-12-31 DIAGNOSIS — H60.392 OTHER INFECTIVE ACUTE OTITIS EXTERNA OF LEFT EAR: Primary | ICD-10-CM

## 2024-12-31 PROCEDURE — 99213 OFFICE O/P EST LOW 20 MIN: CPT | Performed by: STUDENT IN AN ORGANIZED HEALTH CARE EDUCATION/TRAINING PROGRAM

## 2024-12-31 PROCEDURE — G2211 COMPLEX E/M VISIT ADD ON: HCPCS | Performed by: STUDENT IN AN ORGANIZED HEALTH CARE EDUCATION/TRAINING PROGRAM

## 2024-12-31 RX ORDER — CIPROFLOXACIN AND DEXAMETHASONE 3; 1 MG/ML; MG/ML
4 SUSPENSION/ DROPS AURICULAR (OTIC) 2 TIMES DAILY
Qty: 7.5 ML | Refills: 0 | Status: SHIPPED | OUTPATIENT
Start: 2024-12-31 | End: 2025-01-07

## 2025-01-13 ENCOUNTER — OFFICE VISIT (OUTPATIENT)
Dept: PHYSICAL THERAPY | Facility: REHABILITATION | Age: 70
End: 2025-01-13
Payer: MEDICARE

## 2025-01-13 DIAGNOSIS — M54.2 NECK PAIN, CHRONIC: Primary | ICD-10-CM

## 2025-01-13 DIAGNOSIS — G89.29 NECK PAIN, CHRONIC: Primary | ICD-10-CM

## 2025-01-13 PROCEDURE — 97140 MANUAL THERAPY 1/> REGIONS: CPT | Performed by: PHYSICAL THERAPIST

## 2025-01-13 PROCEDURE — 97112 NEUROMUSCULAR REEDUCATION: CPT | Performed by: PHYSICAL THERAPIST

## 2025-01-13 NOTE — PROGRESS NOTES
"Daily Note     Today's date: 2025  Patient name: Fabby Rahman  : 1955  MRN: [id number]  Referring provider: Tuan Gutierrez, *  Dx:   Encounter Diagnosis     ICD-10-CM    1. Neck pain, chronic  M54.2     G89.29           Start Time: 1445  Stop Time: 1530  Total time in clinic (min): 45 minutes    Subjective: Patient reports about 90-95% better at this time. Continues with HEP which helps but does have some moments of pain when turning her neck.       Objective: See treatment diary below      Assessment: Tolerated treatment well. She had improved segmental mobility with L/R SG. Overall improved tissue tone along UT, levator and CS paraspinals.       Plan:  hold PT at this time .  Patient to call if needed over the next month.     Precautions: Standard       Manuals 1/13 9/18 10/2 10/9 10/16 11/18 11/25 12/2 12/16 12/30   STM superior scap          AB   STM R side CS paraspinals  AB AB AB AB AB AB AB   AB   CS traciton with L SG AB G3 AB  G3/4 AB G3-4 AB g3-4 AB G3 AB G3 AB G3 AB G3 AB G3 AB G3   CS dist AB AB AB          C1 MET - R SB             STM levator with PROM and AROM R rot  Redcord used-5 rds With flexion of CS With flexion of CS SOR R SOR R SOR R SOR R SOR R SOR R   CS R rot mob AB G3 2 rds AB - mid CS G3 AB mid CS G3 AB mid CS G3 L - AB mid CS G3 NP L SG (with distraction) L SG(with distraction) L SG with distraction L SG with distraction   Cupping          CS paraspinals R-static and with rotation NP   Mid CS AP mob with flexion        AB     Neuro Re-Ed             DNF 10\"x10  5\"x5 5\"x5         RET + rot with hold   10x 10         RET on redcord   10x 10x                                                             Ther Ex   10/2          Seated RET         10x  RET+ext 5x (2 sets)    Supine RET             SNAG R 2x6 6x           Self STM sub occ     2'        education     8' 10'   5x                                           Ther Activity                                     "   Gait Training                                       Modalities

## 2025-02-20 ENCOUNTER — APPOINTMENT (OUTPATIENT)
Dept: PHYSICAL THERAPY | Facility: REHABILITATION | Age: 70
End: 2025-02-20
Payer: MEDICARE

## 2025-02-20 NOTE — PROGRESS NOTES
PT Evaluation     Today's date: 2025  Patient name: Fabby Rahman  : 1955  MRN: [id number]  Referring provider: Graeme Govea MD  Dx: No diagnosis found.               Assessment  Impairments: abnormal gait, abnormal muscle firing, abnormal muscle tone, abnormal or restricted ROM, abnormal movement, activity intolerance, impaired physical strength and pain with function    Assessment details: Fabby Rahman is a 69 y.o. female presenting to outpatient physical therapy on 25 with referral from  for . Upon evaluation, Fabby demonstrates impaired . The listed impairments and functional limitation are effecting Fabby ability to function at prior level. They can continue to benefit from physical therapy services at this times in order to address the above discussed impairments and functional limitation in order to allow for a return to premorbid status     Understanding of Dx/Px/POC: good     Prognosis: good    Plan  Patient would benefit from: skilled PT  Planned modality interventions: thermotherapy: hydrocollator packs    Planned therapy interventions: joint mobilization, manual therapy, ADL training, balance, balance/weight bearing training, neuromuscular re-education, home exercise program, therapeutic exercise, therapeutic activities, strengthening, patient education, functional ROM exercises and gait training    Frequency: 2x week  Duration in weeks: 12  Treatment plan discussed with: patient      Subjective Evaluation    History of Present Illness  Mechanism of injury: Fabby is a 69 y.o. female presenting to physical therapy on 25 with referral from [facility] for           Patient Goals  Patient goals for therapy: decreased pain    Short Term Goals:   1. Patient will be Independent with hep  2. Patient will improve pain with activity by 50%  3. Patient will report GROC 50% or greater      Long Term Goals:   1. Patient will improve FOTO to greater then goal  2. Patient will improve  pain with activity to 2/10 or less  3. Patient will continue with HEP independence to allow for decreased future reoccurrence of pain and loss in function  4. Patient will report GROC 75% or greater      Objective    Posture:  Palpation:   Myotomes (L/R):{sensa:62668}   L2:      L3:     L4:       L5:        S1:  Dermatome: (pinprick- L/R):   {sensa:23406} or  L2:        L3:        L4:        L5:             Reflexes:  (L/R) L3-4:        S1:                GAIT:  Squat assess: ***  Single leg Squat: ***  Singe Leg Stance: ***  Steps:     Ligamentous Testing:   Lachman's Test: {POSITIVE:37537}  Anterior Drawer: {POSITIVE:09572}  Varus/Valgus (Cruciate bias): {POSITIVE:48888}  Varus/Valgus (Collateral bias): {POSITIVE:14538}  Posterior Drawer: {POSITIVE:00719}  Posterior Sag Sign: {POSITIVE:63203}  Dial Test: {POSITIVE:64822}    Meniscal Tests:  Catching/Clicking/Locking {YES/NO:20200}  Joint Line Tenderness: {YES/NO:20200}  Pain with knee flexion overpressure: {YES/NO:20200}  Pain with knee extension overpressure: {YES/NO:20200}  Praveen Test: {POSITIVE:20601}          MMT         AROM          PROM    Hip       L       R        L           R      L     R   Flex.         Extn.         Abd.         Add.         IR.         ER.         G. Max         G. Med         Iliop.         .         Knee         Extension         Flexion                  Ankle         Dorsi Flexion         Plantar Flexion           Special Test:  Ely Test:        90/90 Test:      Flaco Test:      Bossman Test:        Hamstring Dominance:      Lateral Step Down Test:      Cibulka scan=     Neuro Dynamic Testing:  Slump test: L=     R=       Straight leg raise:   L=      R=        PKB:   L=    R=                       ANTT findings:            Segmental mobility:   Patella:    Tibio-Femoral Joint:            Comments:               Precautions:       Manuals                                                                 Neuro Re-Ed                                                                                                         Ther Ex                                                                                                                     Ther Activity                                       Gait Training                                       Modalities

## 2025-02-28 ENCOUNTER — OFFICE VISIT (OUTPATIENT)
Dept: PHYSICAL THERAPY | Facility: REHABILITATION | Age: 70
End: 2025-02-28
Payer: MEDICARE

## 2025-02-28 ENCOUNTER — APPOINTMENT (OUTPATIENT)
Dept: PHYSICAL THERAPY | Facility: REHABILITATION | Age: 70
End: 2025-02-28
Payer: MEDICARE

## 2025-02-28 DIAGNOSIS — M25.562 LEFT MEDIAL KNEE PAIN: Primary | ICD-10-CM

## 2025-02-28 PROCEDURE — 97162 PT EVAL MOD COMPLEX 30 MIN: CPT | Performed by: PHYSICAL THERAPIST

## 2025-02-28 NOTE — PROGRESS NOTES
PT Evaluation     Today's date: 2025  Patient name: Fabby Rahman  : 1955  MRN: [id number]  Referring provider: Tuan Gutierrez, *  Dx:   Encounter Diagnosis     ICD-10-CM    1. Left medial knee pain  M25.562           Start Time: 835  Stop Time: 905  Total time in clinic (min): 30 minutes    Assessment  Impairments: abnormal muscle firing, abnormal muscle tone, abnormal or restricted ROM, abnormal movement, activity intolerance, impaired physical strength and pain with function    Assessment details: Fabby Rahman is a 69 y.o. female presenting to outpatient physical therapy on 25 with referral from [facility] for left medial knee pain after acute injury during tennis. MSIS consistent with knee derangement, ext preference. Upon evaluation, Fabby demonstrates stiffness into end range ext with tissue tone increase along adductors. The listed impairments and functional limitation are effecting Fabby ability to function at prior level. They can continue to benefit from physical therapy services at this times in order to address the above discussed impairments and functional limitation in order to allow for a return to premorbid status     REP knee ext - standing    Understanding of Dx/Px/POC: good     Prognosis: good    Plan  Patient would benefit from: skilled PT  Planned modality interventions: thermotherapy: hydrocollator packs    Planned therapy interventions: joint mobilization, manual therapy, ADL training, neuromuscular re-education, home exercise program, therapeutic exercise, therapeutic activities, strengthening, patient education and functional ROM exercises    Frequency: 2x week  Duration in weeks: 4  Plan of Care beginning date: 2025  Plan of Care expiration date: 3/28/2025  Treatment plan discussed with: patient        Subjective Evaluation    History of Present Illness  Mechanism of injury: Fabby is a 69 y.o. female presenting to physical therapy on 25 with referral from  [facility] for acute knee pain that began 3 weeks ago.    She reports that she was playing tennis, made a lateral movement to her right, felt some left knee pain, no big pop.     She did have an injection (CSI) with relief. Pain is continuing to improve.     Between today and initial injury, she did golf for 3 days straight and noted increased pain.     She has since played pickle ball and walking about an hour without pain    Denies numbness or tingle, no giving way.           Patient Goals  Patient goals for therapy: decreased pain, return to sport/leisure activities and increased strength    Pain  Current pain ratin  At worst pain ratin  Location: see above      Diagnostic Tests  X-ray: normal  Treatments  Previous treatment: injection treatment    Short Term Goals:   1. Patient will be Independent with hep  2. Patient will improve pain with activity by 50%  3. Patient will report GROC 50% or greater  4. Patient will have normalized knee ext      Long Term Goals:   1. Patient will improve FOTO to greater then goal  2. Patient will improve pain with activity to 2/10 or less  3. Patient will continue with HEP independence to allow for decreased future reoccurrence of pain and loss in function  4. Patient will report GROC 75% or greater  5. Patient will return to tennis, pickle ball pfree      Objective            GAIT: normal  Squat assess: 50% pfree  Single leg Squat: 25% Pain (reports less post therapy)      Ligamentous Testing:     Varus/Valgus (Collateral bias): negative       Meniscal Tests:  Catching/Clicking/Locking no  Joint Line Tenderness: no  Pain with knee flexion overpressure: no  Pain with knee extension overpressure: no  Praveen Test: negative           MMT         AROM          PROM    Hip       L       R        L           R      L     R   Flex.         Extn.         Abd.         Add.         IR.         ER.         G. Max         G. Med         Iliop.         .         Knee          Extension WNL (end range stiffness) WNL       Flexion WNL WNL                Ankle         Dorsi Flexion         Plantar Flexion                Segmental mobility:   Patella: normal  Tibio-Femoral Joint:     normal       Comments:                Precautions: standard      Manuals                                                                 Neuro Re-Ed                                                                                                        Ther Ex                                                                                                                     Ther Activity                                       Gait Training                                       Modalities

## 2025-03-10 ENCOUNTER — OFFICE VISIT (OUTPATIENT)
Dept: PHYSICAL THERAPY | Facility: REHABILITATION | Age: 70
End: 2025-03-10
Payer: MEDICARE

## 2025-03-10 ENCOUNTER — APPOINTMENT (OUTPATIENT)
Dept: PHYSICAL THERAPY | Facility: REHABILITATION | Age: 70
End: 2025-03-10
Payer: MEDICARE

## 2025-03-10 DIAGNOSIS — M25.562 LEFT MEDIAL KNEE PAIN: Primary | ICD-10-CM

## 2025-03-10 PROCEDURE — 97140 MANUAL THERAPY 1/> REGIONS: CPT | Performed by: PHYSICAL THERAPIST

## 2025-03-10 PROCEDURE — 97112 NEUROMUSCULAR REEDUCATION: CPT | Performed by: PHYSICAL THERAPIST

## 2025-03-10 NOTE — PROGRESS NOTES
Daily Note     Today's date: 3/10/2025  Patient name: Fabby Rahman  : 1955  MRN: [id number]  Referring provider: Tuan Gutierrez, *  Dx:   Encounter Diagnosis     ICD-10-CM    1. Left medial knee pain  M25.562           Start Time: 920  Stop Time: 959  Total time in clinic (min): 39 minutes    Subjective: Patient reports feeing small twinges at times. Going downs steps is a stressor for her.       Objective: See treatment diary below      Assessment: Tolerated treatment well. Patient with improved knee ext ROM (normalized) since eval. She is back to playing pickleball but not at 100%. She also participated in exercise activity with jumping jacks which was not an issue.       Plan: Continue per plan of care.      Precautions: standard      Manuals 3/10            Laser 6' - 12 watt medial knee            assessment 5'            STM adductors 3'                         Neuro Re-Ed             3 way hip strengthening YMB  2x10 ea side, 10x with pulses                                                                                          Ther Ex                                                                                                                     Ther Activity                                       Gait Training                                       Modalities

## 2025-03-17 ENCOUNTER — OFFICE VISIT (OUTPATIENT)
Dept: PHYSICAL THERAPY | Facility: REHABILITATION | Age: 70
End: 2025-03-17
Payer: MEDICARE

## 2025-03-17 DIAGNOSIS — M25.562 LEFT MEDIAL KNEE PAIN: Primary | ICD-10-CM

## 2025-03-17 PROCEDURE — 97110 THERAPEUTIC EXERCISES: CPT | Performed by: PHYSICAL THERAPIST

## 2025-03-17 PROCEDURE — 97140 MANUAL THERAPY 1/> REGIONS: CPT | Performed by: PHYSICAL THERAPIST

## 2025-03-17 NOTE — PROGRESS NOTES
Daily Note     Today's date: 3/17/2025  Patient name: Fabby Rahman  : 1955  MRN: [id number]  Referring provider: Tuan Gutierrez, *  Dx:   Encounter Diagnosis     ICD-10-CM    1. Left medial knee pain  M25.562           Start Time: 1530  Stop Time: 1605  Total time in clinic (min): 35 minutes    Subjective: Patient reports that her       Objective: See treatment diary below    TTP, + tone R masseter, lat. pterygoid    Assessment: Tolerated treatment well. Reports less pain post session in jaw, educated on self STM to masseter.     CS protraction + ext = decreased better with CS R Rot, to perform for HEP.     Knee ROM restored, WNL      Plan: Continue per plan of care.      Precautions: standard      Manuals 3/10 3/17           Laser 6' - 12 watt medial knee            assessment 5' 25'           STM adductors 3'                         Neuro Re-Ed             3 way hip strengthening YMB  2x10 ea side, 10x with pulses                                                                                          Ther Ex             Prot + ext  5+5+5                                                                                                      Ther Activity                                       Gait Training                                       Modalities

## 2025-03-19 ENCOUNTER — OFFICE VISIT (OUTPATIENT)
Age: 70
End: 2025-03-19
Payer: MEDICARE

## 2025-03-19 VITALS — OXYGEN SATURATION: 97 % | TEMPERATURE: 98.4 F | HEART RATE: 77 BPM | RESPIRATION RATE: 16 BRPM

## 2025-03-19 DIAGNOSIS — J06.9 ACUTE UPPER RESPIRATORY INFECTION: Primary | ICD-10-CM

## 2025-03-19 PROCEDURE — 99213 OFFICE O/P EST LOW 20 MIN: CPT | Performed by: STUDENT IN AN ORGANIZED HEALTH CARE EDUCATION/TRAINING PROGRAM

## 2025-03-19 PROCEDURE — G2211 COMPLEX E/M VISIT ADD ON: HCPCS | Performed by: STUDENT IN AN ORGANIZED HEALTH CARE EDUCATION/TRAINING PROGRAM

## 2025-03-19 RX ORDER — AZITHROMYCIN 250 MG/1
TABLET, FILM COATED ORAL
Qty: 6 TABLET | Refills: 0 | Status: SHIPPED | OUTPATIENT
Start: 2025-03-19 | End: 2025-03-24

## 2025-03-19 RX ORDER — DEXTROMETHORPHAN HYDROBROMIDE AND PROMETHAZINE HYDROCHLORIDE 15; 6.25 MG/5ML; MG/5ML
5 SYRUP ORAL 4 TIMES DAILY PRN
Qty: 240 ML | Refills: 0 | Status: SHIPPED | OUTPATIENT
Start: 2025-03-19

## 2025-03-19 NOTE — PROGRESS NOTES
[facility] INTERNAL MEDICINE- [place]  OFFICE VISIT     PATIENT INFORMATION     Fabby Rahman   69 y.o. female   MRN: [id number]    ASSESSMENT/PLAN     Assessment & Plan  Acute upper respiratory infection  She is concerned that she might have a laryngitis which has been present for the past 4 to 5 days.  Per chart review patient does have history of pharyngitis/laryngitis but last one being a few years ago.  This has been treated successfully with azithromycin in the past.  She has symptoms of chest congestion, cough with mucus production, runny nose, postnasal drip, sore throat, headaches.  After a few days symptoms initially seem to be improving however they relapsed today and became worse.  She does not have any history of seasonal allergies.  History and relapsing symptoms suggest bacterial component may be at play for acute upper respiratory infection.  Will treat as such with course of azithromycin.  Patient may use Phenergan DM for cough and congestion.  Orders:    azithromycin (Zithromax) 250 mg tablet; Take 2 tablets (500 mg total) by mouth daily for 1 day, THEN 1 tablet (250 mg total) daily for 4 days.    promethazine-dextromethorphan (PHENERGAN-DM) 6.25-15 mg/5 mL oral syrup; Take 5 mL by mouth 4 (four) times a day as needed for cough         HEALTH MAINTENANCE     Immunization History   Administered Date(s) Administered    COVID-19 MODERNA VACC 0.25 ML IM BOOSTER 01/05/2022    COVID-19 MODERNA VACC 0.5 ML IM 01/23/2021, 02/20/2021, 01/05/2022    Hep A, adult 07/23/2004, 01/28/2005    INFLUENZA 09/23/2013, 11/22/2014, 11/28/2015, 11/28/2015, 09/29/2016, 09/29/2016, 10/16/2017, 10/17/2018    Influenza Quadrivalent, 6-35 Months IM 09/29/2016, 10/16/2017    Influenza, recombinant, quadrivalent,injectable, preservative free 11/30/2021    Influenza, seasonal, injectable 11/15/2006, 12/05/2007, 09/23/2013    Td (adult), adsorbed 06/25/2007    Tdap 02/12/2001, 11/07/2013, 05/01/2023    Zoster Vaccine  Recombinant 03/30/2021, 08/12/2021         CHIEF COMPLAINT     Chief Complaint   Patient presents with    Cold Like Symptoms     X Saturday night   Coughing , runny nose , congestion       HISTORY OF PRESENT ILLNESS     Ms. Fabby Rahman is a 69-year-old female with past medical history of osteopenia, anxiety, hyperlipidemia.  She presents to clinic today for acute same-day sick visit.  She is concerned that she might have a laryngitis which has been present for the past 4 to 5 days.  Per chart review patient does have history of pharyngitis/laryngitis but last one being a few years ago.  This has been treated successfully with azithromycin in the past.    REVIEW OF SYSTEMS     Review of Systems   Constitutional:  Negative for chills and fever.   HENT:  Positive for congestion, postnasal drip, rhinorrhea and sore throat.    Respiratory:  Positive for cough. Negative for shortness of breath and wheezing.    Cardiovascular:  Negative for chest pain and leg swelling.   Gastrointestinal:  Negative for abdominal distention, abdominal pain, constipation, diarrhea, nausea and vomiting.   Genitourinary:  Negative for difficulty urinating and dysuria.   Musculoskeletal:  Negative for arthralgias and back pain.   Skin:  Negative for rash and wound.   Neurological:  Negative for dizziness, syncope, weakness, light-headedness and headaches.   Psychiatric/Behavioral:  Negative for agitation, behavioral problems and confusion.      OBJECTIVE     Vitals:    03/19/25 1304   Pulse: 77   Resp: 16   Temp: 98.4 °F (36.9 °C)   SpO2: 97%     Physical Exam  Constitutional:       General: She is not in acute distress.     Appearance: Normal appearance. She is not ill-appearing.   HENT:      Right Ear: Tympanic membrane, ear canal and external ear normal.      Left Ear: Tympanic membrane, ear canal and external ear normal.      Nose: Congestion and rhinorrhea present.      Mouth/Throat:      Mouth: Mucous membranes are moist.      Pharynx:  Oropharynx is clear. Posterior oropharyngeal erythema present. No oropharyngeal exudate.   Cardiovascular:      Rate and Rhythm: Normal rate and regular rhythm.      Pulses: Normal pulses.      Heart sounds: Normal heart sounds. No murmur heard.  Pulmonary:      Effort: Pulmonary effort is normal. No respiratory distress.      Breath sounds: Normal breath sounds. No wheezing, rhonchi or rales.   Abdominal:      General: Abdomen is flat. Bowel sounds are normal. There is no distension.      Palpations: Abdomen is soft.      Tenderness: There is no abdominal tenderness.   Musculoskeletal:      Cervical back: Normal range of motion and neck supple.      Right lower leg: No edema.      Left lower leg: No edema.   Skin:     General: Skin is warm and dry.   Neurological:      Mental Status: She is alert and oriented to person, place, and time.   Psychiatric:         Mood and Affect: Mood normal.         Behavior: Behavior normal.         Thought Content: Thought content normal.       CURRENT MEDICATIONS     Current Outpatient Medications:     azithromycin (Zithromax) 250 mg tablet, Take 2 tablets (500 mg total) by mouth daily for 1 day, THEN 1 tablet (250 mg total) daily for 4 days., Disp: 6 tablet, Rfl: 0    promethazine-dextromethorphan (PHENERGAN-DM) 6.25-15 mg/5 mL oral syrup, Take 5 mL by mouth 4 (four) times a day as needed for cough, Disp: 240 mL, Rfl: 0    ALPRAZolam (XANAX) 0.25 mg tablet, Take 1 tablet (0.25 mg total) by mouth daily at bedtime as needed for anxiety, Disp: 30 tablet, Rfl: 0    Calcium Carbonate-Vit D-Min (CALCIUM 1200 PO), Take by mouth, Disp: , Rfl:     Cholecalciferol 1000 units capsule, Take 2,000 Units by mouth daily, Disp: , Rfl:     ciprofloxacin-dexamethasone (CIPRODEX) otic suspension, Administer 4 drops into the left ear 2 (two) times a day for 7 days, Disp: 7.5 mL, Rfl: 0    MAGNESIUM PO, Take by mouth, Disp: , Rfl:     mometasone (ELOCON) 0.1 % cream, Apply topically daily, Disp: 45 g,  Rfl: 1    multivitamin (THERAGRAN) TABS, Take 1 tablet by mouth daily, Disp: , Rfl:     Omega-3 Fatty Acids (FISH OIL PO), Take 2 g by mouth, Disp: , Rfl:     TURMERIC CURCUMIN PO, Take by mouth, Disp: , Rfl:     VITAMIN C, CALCIUM ASCORBATE, PO, Take by mouth, Disp: , Rfl:     PAST MEDICAL & SURGICAL HISTORY     Past Medical History:   Diagnosis Date    Allergic contact dermatitis     Allergic to insect bites and stings     Conjunctivitis     Herpes simplex infection     last assessed: 2013    Laryngitis     Pharyngitis     Pneumonia      Past Surgical History:   Procedure Laterality Date     SECTION      MOUTH SURGERY       SOCIAL & FAMILY HISTORY     Social History     Socioeconomic History    Marital status: /Civil Union     Spouse name: Not on file    Number of children: Not on file    Years of education: Not on file    Highest education level: Not on file   Occupational History    Not on file   Tobacco Use    Smoking status: Never    Smokeless tobacco: Never   Vaping Use    Vaping status: Never Used   Substance and Sexual Activity    Alcohol use: Yes     Comment: soc    Drug use: No    Sexual activity: Yes     Partners: Male     Birth control/protection: Post-menopausal   Other Topics Concern    Not on file   Social History Narrative    Daily coffee consumption - 2 cups a day     Exercising regularly      Social Drivers of Health     Financial Resource Strain: Low Risk  (2023)    Overall Financial Resource Strain (CARDIA)     Difficulty of Paying Living Expenses: Not hard at all   Food Insecurity: No Food Insecurity (2024)    Nursing - Inadequate Food Risk Classification     Worried About Running Out of Food in the Last Year: Never true     Ran Out of Food in the Last Year: Never true     Ran Out of Food in the Last Year: Not on file   Transportation Needs: No Transportation Needs (2024)    PRAPARE - Transportation     Lack of Transportation (Medical): No     Lack of  Transportation (Non-Medical): No   Physical Activity: Not on file   Stress: Not on file   Social Connections: Not on file   Intimate Partner Violence: Not on file   Housing Stability: Unknown (5/6/2024)    Housing Stability Vital Sign     Unable to Pay for Housing in the Last Year: No     Number of Times Moved in the Last Year: Not on file     Homeless in the Last Year: No     Social History     Substance and Sexual Activity   Alcohol Use Yes    Comment: soc       Social History     Substance and Sexual Activity   Drug Use No     Social History     Tobacco Use   Smoking Status Never   Smokeless Tobacco Never     Family History   Problem Relation Age of Onset    Glaucoma Mother     Heart disease Mother     Hypertension Mother     Parkinsonism Father     No Known Problems Sister     No Known Problems Daughter     Heart disease Maternal Grandmother     No Known Problems Maternal Grandfather     Cancer Paternal Grandmother         unsure of type--in digestive tract    No Known Problems Paternal Grandfather     No Known Problems Brother     Melanoma Brother     No Known Problems Son     Lung cancer Maternal Aunt     No Known Problems Paternal Aunt     No Known Problems Paternal Aunt     No Known Problems Paternal Aunt     No Known Problems Paternal Aunt     No Known Problems Paternal Aunt     Atrial fibrillation Family     Cancer Family     Heart disease Family     Hypertension Family     Parkinsonism Family      ==  Eder Trinh DO  [facility]  [address] [place] [id number]  Office: [phone]  Fax: [phone]

## 2025-04-17 DIAGNOSIS — F41.9 ANXIETY: ICD-10-CM

## 2025-04-18 RX ORDER — ALPRAZOLAM 0.25 MG
0.25 TABLET ORAL
Qty: 30 TABLET | Refills: 0 | Status: SHIPPED | OUTPATIENT
Start: 2025-04-18

## 2025-04-21 ENCOUNTER — OFFICE VISIT (OUTPATIENT)
Age: 70
End: 2025-04-21
Payer: MEDICARE

## 2025-04-21 VITALS
TEMPERATURE: 97.5 F | OXYGEN SATURATION: 99 % | HEIGHT: 65 IN | BODY MASS INDEX: 19.99 KG/M2 | WEIGHT: 120 LBS | HEART RATE: 68 BPM

## 2025-04-21 DIAGNOSIS — R19.7 DIARRHEA OF PRESUMED INFECTIOUS ORIGIN: Primary | ICD-10-CM

## 2025-04-21 PROCEDURE — G2211 COMPLEX E/M VISIT ADD ON: HCPCS | Performed by: INTERNAL MEDICINE

## 2025-04-21 PROCEDURE — 99213 OFFICE O/P EST LOW 20 MIN: CPT | Performed by: INTERNAL MEDICINE

## 2025-04-21 NOTE — PROGRESS NOTES
Name: Fabby Rahman      : 1955      MRN: [id number]  Encounter Provider: Tuan Gutierrez MD  Encounter Date: 2025   Encounter department: [facility] INTERNAL MEDICINE    Assessment & Plan  Diarrhea of presumed infectious origin    Orders:    Stool Enteric Bacterial Panel by PCR; Future    Giardia lamblia, EIA and Ova and Parasites Examination; Future         History of Present Illness     This 69-year-old female patient presents today for an acute visit.  She presents today for evaluation of change in bowel habits.  She and her  recently traveled to [place] for a several day bike excursion [place].  She indicates approximately 5 out of the 10 people on the trip developed diarrhea during the course of their trip.  The patient herself was 1 of those who experienced loose bowel movements.  She is had frequent bowel movements with increased mucus noted in stool and some blood.  Indicates she did have cold sweats and also dry heaves.  Appetite has been okay cramping noted in the abdomen only on the first day      Review of Systems   Gastrointestinal:  Positive for diarrhea.   All other systems reviewed and are negative.    Past Medical History:   Diagnosis Date    Allergic contact dermatitis     Allergic to insect bites and stings     Conjunctivitis     Herpes simplex infection     last assessed: 2013    Laryngitis     Pharyngitis     Pneumonia     Varicella 1963     Past Surgical History:   Procedure Laterality Date     SECTION      MOUTH SURGERY       Family History   Problem Relation Age of Onset    Glaucoma Mother     Heart disease Mother     Hypertension Mother     Parkinsonism Father     No Known Problems Sister     No Known Problems Daughter     Heart disease Maternal Grandmother     No Known Problems Maternal Grandfather     Cancer Paternal Grandmother         unsure of type--in digestive tract    No Known Problems Paternal Grandfather      Cancer Brother     Melanoma Brother     No Known Problems Son     Lung cancer Maternal Aunt     No Known Problems Paternal Aunt     No Known Problems Paternal Aunt     No Known Problems Paternal Aunt     No Known Problems Paternal Aunt     No Known Problems Paternal Aunt     Atrial fibrillation Family     Cancer Family     Heart disease Family     Hypertension Family     Parkinsonism Family      Social History     Tobacco Use    Smoking status: Never    Smokeless tobacco: Never   Vaping Use    Vaping status: Never Used   Substance and Sexual Activity    Alcohol use: Yes     Comment: soc    Drug use: No    Sexual activity: Yes     Partners: Male     Birth control/protection: Post-menopausal     Current Outpatient Medications on File Prior to Visit   Medication Sig    ALPRAZolam (XANAX) 0.25 mg tablet Take 1 tablet (0.25 mg total) by mouth daily at bedtime as needed for anxiety    Calcium Carbonate-Vit D-Min (CALCIUM 1200 PO) Take by mouth    Cholecalciferol 1000 units capsule Take 2,000 Units by mouth daily    MAGNESIUM PO Take by mouth    mometasone (ELOCON) 0.1 % cream Apply topically daily    multivitamin (THERAGRAN) TABS Take 1 tablet by mouth daily    Omega-3 Fatty Acids (FISH OIL PO) Take 2 g by mouth    TURMERIC CURCUMIN PO Take by mouth    VITAMIN C, CALCIUM ASCORBATE, PO Take by mouth    ciprofloxacin-dexamethasone (CIPRODEX) otic suspension Administer 4 drops into the left ear 2 (two) times a day for 7 days    [DISCONTINUED] promethazine-dextromethorphan (PHENERGAN-DM) 6.25-15 mg/5 mL oral syrup Take 5 mL by mouth 4 (four) times a day as needed for cough (Patient not taking: Reported on 4/21/2025)     No Known Allergies  Immunization History   Administered Date(s) Administered    COVID-19 MODERNA VACC 0.25 ML IM BOOSTER 01/05/2022    COVID-19 MODERNA VACC 0.5 ML IM 01/23/2021, 02/20/2021, 01/05/2022    Hep A, adult 07/23/2004, 01/28/2005    INFLUENZA 09/23/2013, 11/22/2014, 11/28/2015, 11/28/2015,  "09/29/2016, 09/29/2016, 10/16/2017, 10/17/2018    Influenza Quadrivalent, 6-35 Months IM 09/29/2016, 10/16/2017    Influenza, recombinant, quadrivalent,injectable, preservative free 11/30/2021    Influenza, seasonal, injectable 11/15/2006, 12/05/2007, 09/23/2013    Td (adult), adsorbed 06/25/2007    Tdap 02/12/2001, 11/07/2013, 05/01/2023    Zoster Vaccine Recombinant 03/30/2021, 08/12/2021     Objective   Pulse 68   Temp 97.5 °F (36.4 °C) (Tympanic)   Ht 5' 4.5\" (1.638 m)   Wt 54.4 kg (120 lb)   SpO2 99%   BMI 20.28 kg/m²     Physical Exam  Abdominal:      General: Abdomen is flat. Bowel sounds are normal. There is no distension.      Palpations: There is no mass.      Tenderness: There is no abdominal tenderness. There is no guarding.   Musculoskeletal:      Right lower leg: No edema.      Left lower leg: No edema.         "

## 2025-04-21 NOTE — ASSESSMENT & PLAN NOTE
Orders:    Stool Enteric Bacterial Panel by PCR; Future    Giardia lamblia, EIA and Ova and Parasites Examination; Future

## 2025-04-22 ENCOUNTER — APPOINTMENT (OUTPATIENT)
Dept: LAB | Facility: CLINIC | Age: 70
End: 2025-04-22
Attending: INTERNAL MEDICINE
Payer: MEDICARE

## 2025-04-22 DIAGNOSIS — R19.7 DIARRHEA OF PRESUMED INFECTIOUS ORIGIN: ICD-10-CM

## 2025-04-22 LAB — HEMOCCULT STL QL IA: NEGATIVE

## 2025-04-22 PROCEDURE — 87505 NFCT AGENT DETECTION GI: CPT

## 2025-04-23 ENCOUNTER — RESULTS FOLLOW-UP (OUTPATIENT)
Age: 70
End: 2025-04-23

## 2025-04-23 LAB
C COLI+JEJUNI TUF STL QL NAA+PROBE: NEGATIVE
EC STX1+STX2 GENES STL QL NAA+PROBE: NEGATIVE
SALMONELLA SP SPAO STL QL NAA+PROBE: NEGATIVE
SHIGELLA SP+EIEC IPAH STL QL NAA+PROBE: NEGATIVE

## 2025-04-29 DIAGNOSIS — Z00.00 ENCOUNTER FOR PREVENTIVE HEALTH EXAMINATION: Primary | ICD-10-CM

## 2025-05-07 ENCOUNTER — APPOINTMENT (OUTPATIENT)
Dept: LAB | Facility: CLINIC | Age: 70
End: 2025-05-07
Attending: INTERNAL MEDICINE
Payer: MEDICARE

## 2025-05-07 DIAGNOSIS — R19.7 DIARRHEA OF PRESUMED INFECTIOUS ORIGIN: ICD-10-CM

## 2025-05-07 PROCEDURE — 87329 GIARDIA AG IA: CPT

## 2025-05-07 PROCEDURE — 87177 OVA AND PARASITES SMEARS: CPT

## 2025-05-07 PROCEDURE — 87209 SMEAR COMPLEX STAIN: CPT

## 2025-05-12 ENCOUNTER — OFFICE VISIT (OUTPATIENT)
Dept: PHYSICAL THERAPY | Facility: REHABILITATION | Age: 70
End: 2025-05-12
Payer: MEDICARE

## 2025-05-12 ENCOUNTER — TELEPHONE (OUTPATIENT)
Age: 70
End: 2025-05-12

## 2025-05-12 DIAGNOSIS — M25.562 CHRONIC PAIN OF LEFT KNEE: Primary | ICD-10-CM

## 2025-05-12 DIAGNOSIS — G89.29 CHRONIC PAIN OF LEFT KNEE: Primary | ICD-10-CM

## 2025-05-12 PROCEDURE — 97162 PT EVAL MOD COMPLEX 30 MIN: CPT | Performed by: PHYSICAL THERAPIST

## 2025-05-12 NOTE — PROGRESS NOTES
PT Evaluation     Today's date: 2025  Patient name: Fabby Rahman  : 1955  MRN: [id number]  Referring provider: Tuan Gutierrez, *  Dx:   Encounter Diagnosis     ICD-10-CM    1. Chronic pain of left knee  M25.562     G89.29           Start Time: 1215  Stop Time: 1300  Total time in clinic (min): 45 minutes    Assessment  Impairments: abnormal muscle firing, abnormal muscle tone, abnormal or restricted ROM, abnormal movement, activity intolerance, impaired physical strength and pain with function    Assessment details: Fabby Rahman is a 69 y.o. female presenting to outpatient physical therapy on 25 with referral from DA for chronic L knee pain. 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 . The listed impairments and functional limitation are effecting Fabby ability to function at prior level. They can continue to benefit from physical therapy services at this times in order to address the above discussed impairments and functional limitation in order to allow for a return to premorbid status     HEP: standing hip ABD, SLR, DF mob step up    Understanding of Dx/Px/POC: good     Prognosis: good    Plan  Patient would benefit from: skilled PT  Planned modality interventions: thermotherapy: hydrocollator packs    Planned therapy interventions: joint mobilization, manual therapy, ADL training, neuromuscular re-education, home exercise program, therapeutic exercise, therapeutic activities, strengthening, patient education and functional ROM exercises    Frequency: 2x week  Duration in weeks: 8  Plan of Care beginning date: 2025  Plan of Care expiration date: 2025  Treatment plan discussed with: patient        Subjective Evaluation    History of Present Illness  Mechanism of injury: Fabby is a 69 y.o. female presenting to physical therapy on 25 with referral from MD for acute on chronic left knee pain. She had initially injured  her knee in the winter of . She received a CSI with relief and completed PT with relief. Recently she was playing tennis when she re injured her knee, her pain is not as bad     L ankle pain is another c/o but denies and trauma or LUCIEN. Pain in her ankle is anterior, lateral    Taking Aleve for pain relief.           Patient Goals  Patient goals for therapy: decreased pain  Patient goal: return to golf and tennis  Pain  Current pain ratin  At worst pain ratin      Diagnostic Tests  X-ray: normal    Short Term Goals:   1. Patient will be Independent with hep  2. Patient will improve pain with activity by 50%  3. Patient will report GROC 50% or greater      Long Term Goals:   1. Patient will improve FOTO to greater then goal  2. Patient will improve pain with activity to 2/10 or less  3. Patient will continue with HEP independence to allow for decreased future reoccurrence of pain and loss in function  4. Patient will report GROC 75% or greater  5. Patient will return to tennis, golf and pickle ball pfree    Objective                GAIT: normal  Squat assess: p free  Single leg Squat: increased knee valgus L, pelvic drop        Meniscal Tests:  Catching/Clicking/Locking no  Joint Line Tenderness: no  Pain with knee flexion overpressure: no  Pain with knee extension overpressure: no  Praveen Test: NT          MMT         AROM          PROM    Hip       L       R        L           R      L     R   Flex.         Extn.         Abd.         Add.         IR.         ER.         G. Max         G. Med 3+ 4+       Iliop.         .         Knee         Extension         Flexion                  Ankle         Dorsi Flexion 5 10       Plantar Flexion           Hypomobile TCJ and cubiod     Segmental mobility:   Patella:   normal     Tibio-Femoral Joint:   normal                       Precautions: standard       Manuals             Ankle TCJ mob G5 dist                                                   Neuro  Re-Ed             Standing hip ABD GTB  2x10            Knee ext machine 90-30 SL            TKE BJB                                                                Ther Ex                                                                                                                     Ther Activity                                       Gait Training                                       Modalities

## 2025-05-12 NOTE — TELEPHONE ENCOUNTER
Patient called to discuss her lab results from her stool sample. Patient saw 2 resulted and it was normal and she was asking about the other one. Told patient it still says pending so it did not result yet. Patient said she is feeling better and the symptoms have lessened.     Please review and discuss with patient when the last stool sample results.

## 2025-05-14 ENCOUNTER — RESULTS FOLLOW-UP (OUTPATIENT)
Age: 70
End: 2025-05-14

## 2025-05-14 LAB
G LAMBLIA AG STL QL IA: NEGATIVE
O+P STL CONC: NORMAL

## 2025-05-15 ENCOUNTER — OFFICE VISIT (OUTPATIENT)
Dept: FAMILY MEDICINE CLINIC | Facility: CLINIC | Age: 70
End: 2025-05-15

## 2025-05-15 ENCOUNTER — HOSPITAL ENCOUNTER (OUTPATIENT)
Dept: VASCULAR ULTRASOUND | Facility: HOSPITAL | Age: 70
Discharge: HOME | End: 2025-05-15
Attending: FAMILY MEDICINE

## 2025-05-15 ENCOUNTER — HOSPITAL ENCOUNTER (OUTPATIENT)
Dept: NON INVASIVE DIAGNOSTICS | Facility: HOSPITAL | Age: 70
Discharge: HOME | End: 2025-05-15

## 2025-05-15 ENCOUNTER — HOSPITAL ENCOUNTER (OUTPATIENT)
Dept: ULTRASOUND IMAGING | Facility: HOSPITAL | Age: 70
Discharge: HOME | End: 2025-05-15
Attending: FAMILY MEDICINE

## 2025-05-15 ENCOUNTER — HOSPITAL ENCOUNTER (OUTPATIENT)
Dept: CT IMAGING | Facility: HOSPITAL | Age: 70
Discharge: HOME | End: 2025-05-15
Attending: FAMILY MEDICINE

## 2025-05-15 ENCOUNTER — APPOINTMENT (OUTPATIENT)
Dept: LAB | Facility: CLINIC | Age: 70
End: 2025-05-15
Attending: FAMILY MEDICINE

## 2025-05-15 VITALS
OXYGEN SATURATION: 100 % | TEMPERATURE: 97.3 F | HEART RATE: 59 BPM | BODY MASS INDEX: 20.14 KG/M2 | WEIGHT: 118 LBS | DIASTOLIC BLOOD PRESSURE: 60 MMHG | SYSTOLIC BLOOD PRESSURE: 110 MMHG | HEIGHT: 64 IN | RESPIRATION RATE: 14 BRPM

## 2025-05-15 VITALS
SYSTOLIC BLOOD PRESSURE: 110 MMHG | HEIGHT: 64 IN | DIASTOLIC BLOOD PRESSURE: 60 MMHG | WEIGHT: 117.95 LBS | HEART RATE: 60 BPM | BODY MASS INDEX: 20.14 KG/M2

## 2025-05-15 DIAGNOSIS — Z00.00 ENCOUNTER FOR PREVENTIVE HEALTH EXAMINATION: ICD-10-CM

## 2025-05-15 DIAGNOSIS — Z00.00 ANNUAL PHYSICAL EXAM: Primary | ICD-10-CM

## 2025-05-15 DIAGNOSIS — Z12.11 SCREEN FOR COLON CANCER: ICD-10-CM

## 2025-05-15 DIAGNOSIS — F51.01 PRIMARY INSOMNIA: ICD-10-CM

## 2025-05-15 DIAGNOSIS — E04.1 LEFT THYROID NODULE: ICD-10-CM

## 2025-05-15 DIAGNOSIS — D25.9 UTERINE LEIOMYOMA, UNSPECIFIED LOCATION: ICD-10-CM

## 2025-05-15 DIAGNOSIS — R73.03 PREDIABETES: ICD-10-CM

## 2025-05-15 DIAGNOSIS — K59.00 CONSTIPATION, UNSPECIFIED CONSTIPATION TYPE: ICD-10-CM

## 2025-05-15 DIAGNOSIS — N28.1 RENAL CYST, RIGHT: ICD-10-CM

## 2025-05-15 DIAGNOSIS — E78.00 PURE HYPERCHOLESTEROLEMIA: ICD-10-CM

## 2025-05-15 DIAGNOSIS — F41.9 ANXIETY: ICD-10-CM

## 2025-05-15 PROBLEM — R00.2 PALPITATIONS: Status: RESOLVED | Noted: 2021-11-30 | Resolved: 2025-05-15

## 2025-05-15 LAB
25(OH)D3 SERPL-MCNC: 59 NG/ML (ref 30–100)
ALBUMIN SERPL BCG-MCNC: 4.2 G/DL (ref 3.5–5)
ALP SERPL-CCNC: 59 U/L (ref 34–104)
ALT SERPL W P-5'-P-CCNC: 27 U/L (ref 7–52)
ANION GAP SERPL CALCULATED.3IONS-SCNC: 6 MMOL/L (ref 4–13)
AORTIC ROOT: 2.9 CM
ASCENDING AORTA: 2.4 CM
AST SERPL W P-5'-P-CCNC: 32 U/L (ref 13–39)
ATRIAL RATE: 54 BPM
BACTERIA UR QL AUTO: NORMAL /HPF
BASOPHILS # BLD AUTO: 0.05 THOUSANDS/ÂΜL (ref 0–0.1)
BASOPHILS NFR BLD AUTO: 1 % (ref 0–1)
BILIRUB SERPL-MCNC: 0.55 MG/DL (ref 0.2–1)
BILIRUB UR QL STRIP: NEGATIVE
BSA FOR ECHO PROCEDURE: 1.56 M2
BUN SERPL-MCNC: 16 MG/DL (ref 5–25)
CALCIUM SERPL-MCNC: 9.3 MG/DL (ref 8.4–10.2)
CHEST PAIN STATEMENT: NORMAL
CHLORIDE SERPL-SCNC: 100 MMOL/L (ref 96–108)
CHOLEST SERPL-MCNC: 163 MG/DL (ref ?–200)
CLARITY UR: CLEAR
CO2 SERPL-SCNC: 30 MMOL/L (ref 21–32)
COLOR UR: COLORLESS
CREAT SERPL-MCNC: 0.61 MG/DL (ref 0.6–1.3)
CRP SERPL HS-MCNC: 1.24 MG/L
E WAVE DECELERATION TIME: 231 MS
E/A RATIO: 1.55
EOSINOPHIL # BLD AUTO: 0.13 THOUSAND/ÂΜL (ref 0–0.61)
EOSINOPHIL NFR BLD AUTO: 3 % (ref 0–6)
ERYTHROCYTE [DISTWIDTH] IN BLOOD BY AUTOMATED COUNT: 13.2 % (ref 11.6–15.1)
EST. AVERAGE GLUCOSE BLD GHB EST-MCNC: 123 MG/DL
FRACTIONAL SHORTENING: 36 (ref 28–44)
GFR SERPL CREATININE-BSD FRML MDRD: 94 ML/MIN/1.73SQ M
GLUCOSE P FAST SERPL-MCNC: 94 MG/DL (ref 65–99)
GLUCOSE UR STRIP-MCNC: NEGATIVE MG/DL
HBA1C MFR BLD: 5.9 %
HCT VFR BLD AUTO: 42.3 % (ref 34.8–46.1)
HCV AB SER QL: NORMAL
HDLC SERPL-MCNC: 63 MG/DL
HGB BLD-MCNC: 13.7 G/DL (ref 11.5–15.4)
HGB UR QL STRIP.AUTO: NEGATIVE
IMM GRANULOCYTES # BLD AUTO: 0.01 THOUSAND/UL (ref 0–0.2)
IMM GRANULOCYTES NFR BLD AUTO: 0 % (ref 0–2)
INTERVENTRICULAR SEPTUM IN DIASTOLE (PARASTERNAL SHORT AXIS VIEW): 0.6 CM
INTERVENTRICULAR SEPTUM: 0.6 CM (ref 0.6–1.1)
KETONES UR STRIP-MCNC: NEGATIVE MG/DL
LAAS-AP2: 14.5 CM2
LAAS-AP4: 17.4 CM2
LDLC SERPL CALC-MCNC: 91 MG/DL (ref 0–100)
LEFT ATRIUM AREA SYSTOLE SINGLE PLANE A4C: 18.9 CM2
LEFT ATRIUM SIZE: 2.8 CM
LEFT ATRIUM VOLUME (MOD BIPLANE): 43 ML
LEFT ATRIUM VOLUME INDEX (MOD BIPLANE): 27.2 ML/M2
LEFT INTERNAL DIMENSION IN SYSTOLE: 2.8 CM (ref 2.1–4)
LEFT VENTRICULAR INTERNAL DIMENSION IN DIASTOLE: 4.4 CM (ref 3.5–6)
LEFT VENTRICULAR POSTERIOR WALL IN END DIASTOLE: 0.7 CM
LEFT VENTRICULAR STROKE VOLUME: 57 ML
LEUKOCYTE ESTERASE UR QL STRIP: NEGATIVE
LV EF US.2D.A4C+ESTIMATED: 65 %
LVSV (TEICH): 57 ML
LYMPHOCYTES # BLD AUTO: 1.52 THOUSANDS/ÂΜL (ref 0.6–4.47)
LYMPHOCYTES NFR BLD AUTO: 30 % (ref 14–44)
MAX DIASTOLIC BP: 62 MMHG
MAX HR PERCENT: 92 %
MAX HR: 141 BPM
MAX PREDICTED HEART RATE: 153 BPM
MCH RBC QN AUTO: 30.2 PG (ref 26.8–34.3)
MCHC RBC AUTO-ENTMCNC: 32.4 G/DL (ref 31.4–37.4)
MCV RBC AUTO: 93 FL (ref 82–98)
MONOCYTES # BLD AUTO: 0.46 THOUSAND/ÂΜL (ref 0.17–1.22)
MONOCYTES NFR BLD AUTO: 9 % (ref 4–12)
MV E'TISSUE VEL-SEP: 10 CM/S
MV PEAK A VEL: 0.64 M/S
MV PEAK E VEL: 99 CM/S
MV STENOSIS PRESSURE HALF TIME: 67 MS
MV VALVE AREA P 1/2 METHOD: 3.28
NEUTROPHILS # BLD AUTO: 2.97 THOUSANDS/ÂΜL (ref 1.85–7.62)
NEUTS SEG NFR BLD AUTO: 57 % (ref 43–75)
NITRITE UR QL STRIP: NEGATIVE
NON-SQ EPI CELLS URNS QL MICRO: NORMAL /HPF
NRBC BLD AUTO-RTO: 0 /100 WBCS
P AXIS: 78 DEGREES
PH UR STRIP.AUTO: 6.5 [PH]
PLATELET # BLD AUTO: 241 THOUSANDS/UL (ref 149–390)
PMV BLD AUTO: 9.6 FL (ref 8.9–12.7)
POTASSIUM SERPL-SCNC: 4 MMOL/L (ref 3.5–5.3)
PR INTERVAL: 186 MS
PROT SERPL-MCNC: 6.9 G/DL (ref 6.4–8.4)
PROT UR STRIP-MCNC: NEGATIVE MG/DL
PROTOCOL NAME: NORMAL
QRS AXIS: 86 DEGREES
QRSD INTERVAL: 66 MS
QT INTERVAL: 402 MS
QTC INTERVAL: 381 MS
RA PRESSURE ESTIMATED: 10 MMHG
RATE PRESSURE PRODUCT: 282
RBC # BLD AUTO: 4.53 MILLION/UL (ref 3.81–5.12)
RBC #/AREA URNS AUTO: NORMAL /HPF
REASON FOR TERMINATION: NORMAL
RIGHT ATRIUM AREA SYSTOLE A4C: 17.8 CM2
RIGHT VENTRICLE ID DIMENSION: 3.6 CM
RV PSP: 31 MMHG
SL CV LEFT ATRIUM LENGTH A2C: 4.7 CM
SL CV LV EF: 55
SL CV LV EF: 55
SL CV PED ECHO LEFT VENTRICLE DIASTOLIC VOLUME (MOD BIPLANE) 2D: 86 ML
SL CV PED ECHO LEFT VENTRICLE SYSTOLIC VOLUME (MOD BIPLANE) 2D: 29 ML
SL CV STRESS RECOVERY BP: NORMAL MMHG
SL CV STRESS RECOVERY HR: 62 BPM
SL CV STRESS RECOVERY O2 SAT: 98 %
SL CV STRESS STAGE REACHED: 4
SODIUM SERPL-SCNC: 136 MMOL/L (ref 135–147)
SP GR UR STRIP.AUTO: 1 (ref 1–1.03)
STRESS ANGINA INDEX: 0
STRESS BASELINE BP: NORMAL MMHG
STRESS BASELINE HR: 60 BPM
STRESS O2 SAT REST: 100 %
STRESS PEAK HR: 141 BPM
STRESS POST ESTIMATED WORKLOAD: 13.4 METS
STRESS POST EXERCISE DUR MIN: 12 MIN
STRESS POST EXERCISE DUR MIN: 12 MIN
STRESS POST EXERCISE DUR SEC: 0 SEC
STRESS POST EXERCISE DUR SEC: 0 SEC
STRESS POST O2 SAT PEAK: 98 %
STRESS POST PEAK BP: 2 MMHG
STRESS POST PEAK HR: 171 BPM
STRESS POST PEAK SYSTOLIC BP: 172 MMHG
T WAVE AXIS: 78 DEGREES
TARGET HR FORMULA: NORMAL
TEST INDICATION: NORMAL
TR MAX PG: 21 MMHG
TR PEAK VELOCITY: 2.3 M/S
TRICUSPID ANNULAR PLANE SYSTOLIC EXCURSION: 2.5 CM
TRICUSPID VALVE PEAK REGURGITATION VELOCITY: 2.28 M/S
TRIGL SERPL-MCNC: 46 MG/DL (ref ?–150)
TSH SERPL DL<=0.05 MIU/L-ACNC: 1.4 UIU/ML (ref 0.45–4.5)
UROBILINOGEN UR STRIP-ACNC: <2 MG/DL
VENTRICULAR RATE: 54 BPM
WBC # BLD AUTO: 5.14 THOUSAND/UL (ref 4.31–10.16)
WBC #/AREA URNS AUTO: NORMAL /HPF

## 2025-05-15 PROCEDURE — 80061 LIPID PANEL: CPT

## 2025-05-15 PROCEDURE — 76830 TRANSVAGINAL US NON-OB: CPT

## 2025-05-15 PROCEDURE — VASC: Performed by: SURGERY

## 2025-05-15 PROCEDURE — 83695 ASSAY OF LIPOPROTEIN(A): CPT

## 2025-05-15 PROCEDURE — 86141 C-REACTIVE PROTEIN HS: CPT

## 2025-05-15 PROCEDURE — 93010 ELECTROCARDIOGRAM REPORT: CPT | Performed by: INTERNAL MEDICINE

## 2025-05-15 PROCEDURE — 93922 UPR/L XTREMITY ART 2 LEVELS: CPT

## 2025-05-15 PROCEDURE — 36415 COLL VENOUS BLD VENIPUNCTURE: CPT

## 2025-05-15 PROCEDURE — 81001 URINALYSIS AUTO W/SCOPE: CPT

## 2025-05-15 PROCEDURE — 85025 COMPLETE CBC W/AUTO DIFF WBC: CPT

## 2025-05-15 PROCEDURE — 80053 COMPREHEN METABOLIC PANEL: CPT

## 2025-05-15 PROCEDURE — 99499EX: Performed by: FAMILY MEDICINE

## 2025-05-15 PROCEDURE — 93350 STRESS TTE ONLY: CPT

## 2025-05-15 PROCEDURE — 93005 ELECTROCARDIOGRAM TRACING: CPT

## 2025-05-15 PROCEDURE — 84443 ASSAY THYROID STIM HORMONE: CPT

## 2025-05-15 PROCEDURE — 93306 TTE W/DOPPLER COMPLETE: CPT

## 2025-05-15 PROCEDURE — 76700 US EXAM ABDOM COMPLETE: CPT

## 2025-05-15 PROCEDURE — 82306 VITAMIN D 25 HYDROXY: CPT

## 2025-05-15 PROCEDURE — 76856 US EXAM PELVIC COMPLETE: CPT

## 2025-05-15 PROCEDURE — 86803 HEPATITIS C AB TEST: CPT

## 2025-05-15 PROCEDURE — 75571 CT HRT W/O DYE W/CA TEST: CPT

## 2025-05-15 PROCEDURE — 93350 STRESS TTE ONLY: CPT | Performed by: INTERNAL MEDICINE

## 2025-05-15 PROCEDURE — 93306 TTE W/DOPPLER COMPLETE: CPT | Performed by: INTERNAL MEDICINE

## 2025-05-15 PROCEDURE — 83036 HEMOGLOBIN GLYCOSYLATED A1C: CPT

## 2025-05-15 NOTE — ASSESSMENT & PLAN NOTE
Incidental finding on her radiology studies today.  Discussed with patient.  Continue to monitor and follow-up with PCP.

## 2025-05-15 NOTE — ASSESSMENT & PLAN NOTE
Incidental finding on her radiology study today.  Discussed with patient.  Follow-up with her GYN.

## 2025-05-15 NOTE — ASSESSMENT & PLAN NOTE
Not new.  Discussed with patient.  Patient reeducation.  Patient advised well hydration and fiber.  Her constipation is also related to her anxiety and as discussed with patient.  Patient appropriately advised/education.  Orders:  •  Ambulatory Referral to Gastroenterology; Future

## 2025-05-15 NOTE — PROGRESS NOTES
"Nutritional Summary and Recommendations:    Patient Nutrition-Oriented Medical/Diet Hx  Fabby presents today to [facility] for nutrition assessment and counseling. She has a very healthy diet and more than adequate activity.  Fabby eats a variety of grains, fruits, vegetables, seeds, nuts, legumes.  Reviewed pairing plants/legumes with whole grain protein sources for increased bioavailability. Suggested looking into trying wheat berries-soaking them overnight reduces cooking time!    Fluid: ~ 64 oz  Protein: 60-70 g    Magnesium glycinate-sleep  Magnesium citrate-GI  Mouth sores  *Look into \"mouth tape\" for sleeping  *Zinc, B-12, folate, vitamin C    Reviewed labs of interest including A1c 5.9%-same as when last checked in 2014. This writer is unsure as to the etiology of this d/t pt's healthy diet and lifestyle.  Pt was a pleasure meeting with Fabby      Nutrition Related Medications/Supplements:  Women's multivitamin*, vitamin D 2000 iU, plant based calcium supplement, xanex 4-8x/mo for sleep     Labs:  A1C: 5.9%  Total Cholesterol: 163 mg/dL  Triglycerides: 46 mg/dL  HDL: 63 mg/dL  LDL: 91 mg/dL  Vitamin D: 59 ng/mL      Anthropometrics:     Ht: 5'4.5\"  Wt: 116.8 lb   BMI: 19.7     BMR: 1153 kcal   Fat%: 21.4%  Acceptable Range: 24-36%     Fat Mass: 25.0 lb  FFM: 25.0 lbs  TBW: 67.2 lbs    Nutrition Recommendations:    Aim to consume 64 oz of fluids per day  Continue to maintain adequate fiber, 25-30 g/day  Reach out to dietitian with any future questions or concerns    Nutrition Education    Adequate fluids      Perceived Comprehension: Good      Expected Compliance: Good  "

## 2025-05-15 NOTE — PROGRESS NOTES
[facility] Physical Exam     Fabby Rahman is a 69 y.o. female who is presenting for an [facility] Physical Exam at [facility].   This is her first time with [facility].  Her mammogram is already scheduled for this year.  DEXA was done in 2024.  Up-to-date with her gynecologist.  Up-to-date with her vaccines.  Patient does need a colonoscopy.  Patient does have insomnia for which she takes half of Xanax 0.25 mg as needed.  And she also has some anxiety which is not new.  She asserts that she had palpitations only once.  Patient denies tobacco.    Review of Systems   Constitutional:  Negative for activity change, appetite change, chills, fatigue, fever and unexpected weight change.   HENT:  Negative for congestion, dental problem, hearing loss and sore throat.    Eyes:  Negative for visual disturbance.   Respiratory:  Negative for cough and shortness of breath.    Cardiovascular:  Negative for chest pain and palpitations.   Gastrointestinal:  Positive for constipation. Negative for abdominal pain, blood in stool, diarrhea, nausea and vomiting.   Genitourinary:  Negative for dysuria and hematuria.   Musculoskeletal:  Negative for arthralgias.   Skin:  Negative for rash.   Neurological:  Negative for dizziness and headaches.   Psychiatric/Behavioral:  Positive for sleep disturbance. Negative for dysphoric mood, self-injury and suicidal ideas. The patient is nervous/anxious.          Active Ambulatory Problems     Diagnosis Date Noted   • Hyperlipidemia 01/17/2019   • Osteopenia of multiple sites 02/13/2020   • Anxiety 06/25/2021   • Primary insomnia 11/30/2021   • Trace tricuspid valve regurgitation 12/20/2021   • Nonrheumatic pulmonary valve insufficiency 12/20/2021   • Eczema 09/08/2023   • Diarrhea of presumed infectious origin 04/21/2025   • Prediabetes 05/15/2025   • Constipation 05/15/2025   • Left thyroid nodule 05/15/2025   • Renal cyst, right 05/15/2025   •  "Uterine leiomyoma 05/15/2025     Resolved Ambulatory Problems     Diagnosis Date Noted   • Age related osteoporosis 2017   • Hyponatremia 2020   • Immunity status testing 2020   • Healthcare maintenance 2021   • Psychophysiological insomnia 2021   • Epidemic myalgia 2021   • Localized swelling of left thumb 2021   • Fungal skin infection 2021   • Palpitations 2021   • Knee swelling 2022   • Pharyngitis 2023     Past Medical History:   Diagnosis Date   • Allergic contact dermatitis    • Allergic to insect bites and stings    • Conjunctivitis    • Herpes simplex infection    • Laryngitis    • Pneumonia    • Varicella 1963       Past Surgical History:   Procedure Laterality Date   •  SECTION     • MOUTH SURGERY         Family History   Problem Relation Age of Onset   • Glaucoma Mother    • Heart disease Mother    • Hypertension Mother    • Parkinsonism Father    • No Known Problems Sister    • No Known Problems Daughter    • Heart disease Maternal Grandmother    • No Known Problems Maternal Grandfather    • Cancer Paternal Grandmother         unsure of type--in digestive tract   • No Known Problems Paternal Grandfather    • Cancer Brother    • Melanoma Brother    • No Known Problems Son    • Lung cancer Maternal Aunt    • No Known Problems Paternal Aunt    • No Known Problems Paternal Aunt    • No Known Problems Paternal Aunt    • No Known Problems Paternal Aunt    • No Known Problems Paternal Aunt    • Atrial fibrillation Family    • Cancer Family    • Heart disease Family    • Hypertension Family    • Parkinsonism Family        Tobacco Use History[1]    Current Medications[2]    Allergies[3]      Objective:    Vitals:    05/15/25 1004   BP: 110/60   BP Location: Left arm   Patient Position: Sitting   Pulse: 59   Resp: 14   Temp: (!) 97.3 °F (36.3 °C)   SpO2: 100%   Weight: 53.5 kg (118 lb)   Height: 5' 4\" (1.626 m)        Physical Exam  Vitals " reviewed.   Constitutional:       General: She is not in acute distress.     Appearance: Normal appearance. She is normal weight. She is not ill-appearing.   HENT:      Head: Normocephalic and atraumatic.      Right Ear: Tympanic membrane, ear canal and external ear normal.      Left Ear: Tympanic membrane, ear canal and external ear normal.      Mouth/Throat:      Mouth: Mucous membranes are moist.      Pharynx: Oropharynx is clear. No oropharyngeal exudate or posterior oropharyngeal erythema.     Eyes:      Extraocular Movements: Extraocular movements intact.      Conjunctiva/sclera: Conjunctivae normal.      Pupils: Pupils are equal, round, and reactive to light.     Neck:      Vascular: No carotid bruit.     Cardiovascular:      Rate and Rhythm: Normal rate and regular rhythm.   Pulmonary:      Effort: Pulmonary effort is normal.      Breath sounds: Normal breath sounds.   Abdominal:      Palpations: Abdomen is soft.      Tenderness: There is no abdominal tenderness. There is no right CVA tenderness or left CVA tenderness.     Musculoskeletal:         General: No tenderness.      Right lower leg: No edema.      Left lower leg: No edema.      Comments: No calf tenderness bilateral.   Lymphadenopathy:      Cervical: No cervical adenopathy.     Skin:     General: Skin is warm.     Neurological:      General: No focal deficit present.      Mental Status: She is alert and oriented to person, place, and time.     Psychiatric:         Mood and Affect: Mood normal.         Behavior: Behavior normal.         Thought Content: Thought content normal.             Assessment/Plan:     Assessment & Plan  Annual physical exam  Regarding her annual physical patient is given age and diagnosis appropriate evaluation and care.  Patient's blood work and urine was discussed with patient today.  Patient was appropriately advised.  Patient advised to see her GI for her colonoscopy this due.  And get her mammogram done that is due for  this year that she has already scheduled.  Continue her vitamins and supplements.       Prediabetes  Controlled.  Her glucose was 94 and her A1c now is 5.9 when it was 5.9 September 2014.  Advised patient to have the starches sweets and fats.  Diet and exercise.  Recheck A1c with her PCP in 6 months.       Pure hypercholesterolemia  Controlled and improved.  LFTs normal.  Her total cholesterol went down to 163 from 195 in May of last year, triglycerides are 46, HDL 63 and her LDL went down to 91 from 114.  Advised patient to continue cutting down her fats starches and sweets.  Diet and exercise.  Recheck in 6 to 12 months with her PCP.       Primary insomnia  Not new.  Discussed with patient.  Patient briefly counseled.  Patient denies SI HI.  Patient advised sleep hygiene.  And can take melatonin as needed since she does not want to take Xanax every time.  Of note she does take half a Xanax maybe 2-3 nights a week as needed as per patient.       Anxiety  Not new.  Discussed with patient.  Patient previously counseled.  Patient denies HI.  Patient advised to seek therapy which will help her with this as well as her insomnia at times.       Constipation, unspecified constipation type  Not new.  Discussed with patient.  Patient reeducation.  Patient advised well hydration and fiber.  Her constipation is also related to her anxiety and as discussed with patient.  Patient appropriately advised/education.  Orders:  •  Ambulatory Referral to Gastroenterology; Future    Left thyroid nodule  Incidental finding on her radiology studies today.  1.4 cm. Check a thyroid sono now.  Discussed with patient.  Continue to monitor and follow-up with PCP.  Orders:  •  US thyroid; Future    Renal cyst, right  Incidental finding on her radiology studies today.  Discussed with patient.  Continue to monitor and follow-up with PCP.       Uterine leiomyoma, unspecified location  Incidental finding on her radiology study today.  Discussed  with patient.  Follow-up with her GYN.       Screen for colon cancer  Discussed with patient.  Patient sent to GI for her colonoscopy.  Orders:  •  Ambulatory Referral to Gastroenterology; Future      Executive Physical Summary:     Overall, I think you are in pretty good health.  Listed above are the specific diagnoses that you and I discussed today.  Please refer to the individual specialists and studies you had done today for specific advice regarding their areas of expertise.  And continue your vitamins and supplements as you are taking them.  Regarding your anxiety and insomnia, we discussed that today.  I think seeing a therapist will help you work on both.    Fabby,  Thank you for choosing [facility] today.  I know this was your first time with us.  It was a pleasure getting to know you.  If you have any questions regarding today's exam, please contact me.  And we hope to see you again next year for a half-day session.    Sincerely,  -dr adele hauser MD           [1]  Social History  Tobacco Use   Smoking Status Never   Smokeless Tobacco Never   [2]    Current Outpatient Medications:   •  ALPRAZolam (XANAX) 0.25 mg tablet, Take 1 tablet (0.25 mg total) by mouth daily at bedtime as needed for anxiety, Disp: 30 tablet, Rfl: 0  •  Calcium Carbonate-Vit D-Min (CALCIUM 1200 PO), Take by mouth, Disp: , Rfl:   •  Cholecalciferol 1000 units capsule, Take 2,000 Units by mouth daily, Disp: , Rfl:   •  MAGNESIUM PO, Take by mouth, Disp: , Rfl:   •  mometasone (ELOCON) 0.1 % cream, Apply topically daily, Disp: 45 g, Rfl: 1  •  multivitamin (THERAGRAN) TABS, Take 1 tablet by mouth daily, Disp: , Rfl:   •  Omega-3 Fatty Acids (FISH OIL PO), Take 2 g by mouth, Disp: , Rfl:   •  TURMERIC CURCUMIN PO, Take by mouth, Disp: , Rfl:   •  VITAMIN C, CALCIUM ASCORBATE, PO, Take by mouth, Disp: , Rfl: [3]  No Known Allergies

## 2025-05-15 NOTE — ASSESSMENT & PLAN NOTE
Controlled and improved.  LFTs normal.  Her total cholesterol went down to 163 from 195 in May of last year, triglycerides are 46, HDL 63 and her LDL went down to 91 from 114.  Advised patient to continue cutting down her fats starches and sweets.  Diet and exercise.  Recheck in 6 to 12 months with her PCP.

## 2025-05-15 NOTE — ASSESSMENT & PLAN NOTE
Controlled.  Her glucose was 94 and her A1c now is 5.9 when it was 5.9 September 2014.  Advised patient to have the starches sweets and fats.  Diet and exercise.  Recheck A1c with her PCP in 6 months.

## 2025-05-15 NOTE — ASSESSMENT & PLAN NOTE
Incidental finding on her radiology studies today.  1.4 cm. Check a thyroid sono now.  Discussed with patient.  Continue to monitor and follow-up with PCP.  Orders:  •  US thyroid; Future

## 2025-05-15 NOTE — PROGRESS NOTES
Fitness Summary and Recommendations: Fabby scored at the 90% on her body composition assessment with a bodyfat % of 21.4%. Fabby scored in the average range for flexibility with a sit & reach score of 23 cm. Her Muscle Strength/Endurance scores placed her at the 90% (lower body) and 95% (upper body) with a chair stand score of 27 and arm curl score of 22 respectively. Fabby's Cardiovascular Score of 47.2 placed her at the 95%. Overall, Fabby would be considered to have an excellent fitness level with an 82% score. Continued emphasis on regular exercise and sound nutrition practices will enable Fabby to reach her optimal level of fitness.

## 2025-05-15 NOTE — ASSESSMENT & PLAN NOTE
Not new.  Discussed with patient.  Patient previously counseled.  Patient denies HI.  Patient advised to seek therapy which will help her with this as well as her insomnia at times.

## 2025-05-15 NOTE — PROGRESS NOTES
Hearing Assessment Summary:   Hearing Screening    250Hz 500Hz 1000Hz 2000Hz 3000Hz 4000Hz 6000Hz 8000Hz   Right ear 15 10 15 10 5 20 15 20   Left ear 15 10 15 5 10 30 20 20   Comments: HEARING EVALUATION    Name:  Fabby Rahman  :  1955  Age:  69 y.o.   MRN:  [id number]  Date of Evaluation: 05/15/25     History: [facility] Exam  Reason for visit: Fabby Rahman is being seen today for an evaluation of hearing as part of an [facility] examination.  Patient reports no concern over hearing. She reports a history of ear infections and tympanic membrane perforation as a child and some episodes of swimmer's ear as an adult. She denies ear pain, tinnitus and dizziness. No significant noise exposure history is reported.      EVALUATION:    Otoscopic Evaluation:   Right Ear: Clear and healthy ear canal and tympanic membrane   Left Ear: Possible exostoses noted    Tympanometry:   Right: Type A - normal middle ear pressure and compliance   Left: Type Ad - hypermobile compliance    Audiogram Results:  Normal peripheral hearing sensitivity in each ear except for mild sensorineural hearing loss at 4k Hz in the left ear only.    *see attached audiogram      RECOMMENDATIONS:  Annual hearing eval, Return to [facility]. for F/U, Hearing Protection, and Copy to Patient/Caregiver    PATIENT EDUCATION:   Discussed results and recommendations with patient.  Questions were addressed and the patient was encouraged to contact our department should concerns arise.      Saul Rick.  Clinical Audiologist'      Vision Screening    Right eye Left eye Both eyes   Without correction 20/15 20/20 20/20   With correction

## 2025-05-15 NOTE — ASSESSMENT & PLAN NOTE
Not new.  Discussed with patient.  Patient briefly counseled.  Patient denies SI HI.  Patient advised sleep hygiene.  And can take melatonin as needed since she does not want to take Xanax every time.  Of note she does take half a Xanax maybe 2-3 nights a week as needed as per patient.

## 2025-05-15 NOTE — PROGRESS NOTES
"  Your [facility] Board-Certified Dermatologist's Name: Jacki Montoya MD    Skin Screening Exam:    A chaperone was present throughout the entire encounter.      SKIN:  FULL ORGAN SYSTEM EXAM   Hair, Scalp, Ears, Face Normal except as noted below in Assessment   Neck, Cervical Chain Nodes Normal except as noted below in Assessment   Right Arm/Hand/Fingers Normal except as noted below in Assessment   Left Arm/Hand/Fingers Normal except as noted below in Assessment   Chest/Breasts/Axillae Did the patient specifically refuse to have the areas \"under-the-bra\" examined by the Dermatologist? No  Examined areas normal except as noted below in Assessment   Abdomen, Umbilicus Normal except as noted below in Assessment   Back/Spine Normal except as noted below in Assessment   Groin/Genitalia/Buttocks Did the patient specifically refuse to have the areas \"under-the-underwear\" examined by the Dermatologist? No  Examined areas normal except as noted below in Assessment   Right Leg, Foot, Toes Normal except as noted below in Assessment   Left Leg, Foot, Toes Normal except as noted below in Assessment        Assessment and Plan by Diagnosis:  Sun damaged skin (lentigines)  Benign Nevi     Use a moisturizer + sunscreen \"combo\" product such as Neutrogena Daily Defense SPF 50+ or CeraVe AM at least three times a day.  Follow-up with your private dermatologist or one of our board-certified [facility] Dermatologists as discussed.  [facility]'s own Dr. Lia Ellison is available for consultation for skin enhancement and revitalization services; please mention \"[facility]\" for expedited scheduling     "

## 2025-05-15 NOTE — PROGRESS NOTES
Daily Note     Today's date: 2025  Patient name: Fabby Rahman  : 1955  MRN: [id number]  Referring provider: Tuan Gutierrez, *  Dx:   Encounter Diagnosis     ICD-10-CM    1. Chronic pain of left knee  M25.562     G89.29           Start Time: 0915  Stop Time: 1000  Total time in clinic (min): 45 minutes    Subjective: Patient reports feeling well, no c/o knee pain. She played 2 rds of golf without pain.      Objective: See treatment diary below      Assessment: Tolerated treatment well. Patient did with with additional loading to her knee as she had good form with squatting.       Plan: Continue per plan of care.      Precautions: standard       Manuals            Ankle TCJ mob G5 dist                                                   Neuro Re-Ed             Standing hip ABD GTB  2x10 GMB  3x10 ea side           Knee ext machine 90-30 SL SL   20#  3x10           TKE BJB BJB  20x           STS   Alt stance - L leg back  3x8                                                  Ther Ex                                                                                                                     Ther Activity                                       Gait Training                                       Modalities

## 2025-05-15 NOTE — PROGRESS NOTES
HEART AND VASCULAR SUMMARY    1. Vital Sign:  Blood Pressure 110/60 mmHg , Pulse 59 bpm    2. Lipids: Total 163, TG 46, HDL 63, LDL 91, hs C-RP 1.24    3. ECG: Normal sinus rhythm with premature atrial contractions.    4. Echocardiogram: Normal cardiac function with no valvular abnormalities.     5. Stress ECHO: Excellent exercise capacity.  No myocardial ischemia.    6. Vascular testing: Carotids - normal, Abdominal Aorta - normal 2.1 cm.    7. Coronary Calcium CT: 0    8. Cardiovascular Risk Score: 4.5% 10 year risk of heart disease or stroke.      Impression and Recommendations:    Ms Rahman is a 69 year old woman with a history of premature atrial contractions who [facility].  Echo 2023 demonstrated a structurally normal heart.  Holter monitor 2021 demonstrated occasional premature ventricular contractions.     Excellent cardiovascular fitness.  PACs - benign.  Continue with healthy diet and exercise program.

## 2025-05-16 ENCOUNTER — OFFICE VISIT (OUTPATIENT)
Dept: PHYSICAL THERAPY | Facility: REHABILITATION | Age: 70
End: 2025-05-16
Payer: MEDICARE

## 2025-05-16 ENCOUNTER — TELEPHONE (OUTPATIENT)
Age: 70
End: 2025-05-16

## 2025-05-16 DIAGNOSIS — M25.562 CHRONIC PAIN OF LEFT KNEE: Primary | ICD-10-CM

## 2025-05-16 DIAGNOSIS — G89.29 CHRONIC PAIN OF LEFT KNEE: Primary | ICD-10-CM

## 2025-05-16 LAB — LPA SERPL-SCNC: <9 NMOL/L

## 2025-05-16 PROCEDURE — 97140 MANUAL THERAPY 1/> REGIONS: CPT | Performed by: PHYSICAL THERAPIST

## 2025-05-16 PROCEDURE — 97112 NEUROMUSCULAR REEDUCATION: CPT | Performed by: PHYSICAL THERAPIST

## 2025-05-16 NOTE — TELEPHONE ENCOUNTER
Patient has been added to the [facility] wait list without a referral.    Insurance: Yes  Insurance Type:    Commercial []   Medicaid []   [employer] (if applicable)   Medicare [x]  Location Preference: [email or web address]  Provider Preference:   Virtual: Yes [x] No []  Were outside resources sent: Yes [x] No []

## 2025-05-17 ENCOUNTER — RESULTS FOLLOW-UP (OUTPATIENT)
Dept: FAMILY MEDICINE CLINIC | Facility: CLINIC | Age: 70
End: 2025-05-17

## 2025-05-19 ENCOUNTER — APPOINTMENT (OUTPATIENT)
Dept: PHYSICAL THERAPY | Facility: REHABILITATION | Age: 70
End: 2025-05-19
Payer: MEDICARE

## 2025-05-20 ENCOUNTER — HOSPITAL ENCOUNTER (OUTPATIENT)
Dept: RADIOLOGY | Facility: HOSPITAL | Age: 70
Discharge: HOME | End: 2025-05-20
Payer: MEDICARE

## 2025-05-20 DIAGNOSIS — E04.1 LEFT THYROID NODULE: ICD-10-CM

## 2025-05-20 PROCEDURE — 76536 US EXAM OF HEAD AND NECK: CPT

## 2025-05-21 PROBLEM — R19.7 DIARRHEA OF PRESUMED INFECTIOUS ORIGIN: Status: RESOLVED | Noted: 2025-04-21 | Resolved: 2025-05-21

## 2025-05-23 ENCOUNTER — OFFICE VISIT (OUTPATIENT)
Dept: PHYSICAL THERAPY | Facility: REHABILITATION | Age: 70
End: 2025-05-23
Payer: MEDICARE

## 2025-05-23 DIAGNOSIS — G89.29 CHRONIC PAIN OF LEFT KNEE: Primary | ICD-10-CM

## 2025-05-23 DIAGNOSIS — M25.562 CHRONIC PAIN OF LEFT KNEE: Primary | ICD-10-CM

## 2025-05-23 PROCEDURE — 97140 MANUAL THERAPY 1/> REGIONS: CPT | Performed by: PHYSICAL THERAPIST

## 2025-05-23 PROCEDURE — 97112 NEUROMUSCULAR REEDUCATION: CPT | Performed by: PHYSICAL THERAPIST

## 2025-05-23 NOTE — PROGRESS NOTES
Daily Note     Today's date: 2025  Patient name: Fabby Rahman  : 1955  MRN: [id number]  Referring provider: Tuan Gutierrez, *  Dx:   Encounter Diagnosis     ICD-10-CM    1. Chronic pain of left knee  M25.562     G89.29           Start Time: 815  Stop Time: 915  Total time in clinic (min): 60 minutes    Subjective: Patient reports feeling a little stiff today but for the most part, she has felt good.      Objective: See treatment diary below      Assessment: Tolerated treatment well. Patient did have some mild tone increased along medial HS and adductors with a lack in end range knee ext. Improved with MT. She initially has pain on the bike to start that resolved. Cued for form with lunge to ant. WS, load quad.       Plan: Continue per plan of care.      Precautions: standard       Manuals           Ankle TCJ mob G5 dist            STM medial HS/add   10'          Knee ext mob   G4          Hip IR MWM   3x10          Neuro Re-Ed             Standing hip ABD GTB  2x10 GMB  3x10 ea side           Knee ext machine 90-30 SL SL   20#  3x10           TKE BJB BJB  20x BTB  20x          STS   Alt stance - L leg back  3x8 L leg back  12x          lunges   Bosu  2x10          Lunge @ counter   10x          education   10'          Ther Ex             bike   5' L1 w/u                                                                                                     Ther Activity                                       Gait Training                                       Modalities

## 2025-06-02 ENCOUNTER — APPOINTMENT (OUTPATIENT)
Dept: PHYSICAL THERAPY | Facility: REHABILITATION | Age: 70
End: 2025-06-02
Payer: MEDICARE

## 2025-06-02 ENCOUNTER — OFFICE VISIT (OUTPATIENT)
Dept: PHYSICAL THERAPY | Facility: REHABILITATION | Age: 70
End: 2025-06-02
Payer: MEDICARE

## 2025-06-02 DIAGNOSIS — M25.562 CHRONIC PAIN OF LEFT KNEE: Primary | ICD-10-CM

## 2025-06-02 DIAGNOSIS — G89.29 CHRONIC PAIN OF LEFT KNEE: Primary | ICD-10-CM

## 2025-06-02 PROCEDURE — 97112 NEUROMUSCULAR REEDUCATION: CPT | Performed by: PHYSICAL THERAPIST

## 2025-06-05 ENCOUNTER — OFFICE VISIT (OUTPATIENT)
Dept: PHYSICAL THERAPY | Facility: REHABILITATION | Age: 70
End: 2025-06-05
Payer: MEDICARE

## 2025-06-05 DIAGNOSIS — G89.29 CHRONIC PAIN OF LEFT KNEE: Primary | ICD-10-CM

## 2025-06-05 DIAGNOSIS — M25.562 CHRONIC PAIN OF LEFT KNEE: Primary | ICD-10-CM

## 2025-06-05 PROCEDURE — 97140 MANUAL THERAPY 1/> REGIONS: CPT | Performed by: PHYSICAL THERAPIST

## 2025-06-05 NOTE — PROGRESS NOTES
Daily Note     Today's date: 2025  Patient name: Fabby Rahman  : 1955  MRN: [id number]  Referring provider: Tuan Gutierrez, *  Dx:   Encounter Diagnosis     ICD-10-CM    1. Chronic pain of left knee  M25.562     G89.29           Start Time: 1605  Stop Time: 1630  Total time in clinic (min): 25 minutes    Subjective: Patient reports doing well. She has not played pickle since Monday but did golf and her knee was fine. She is playing pickle tonight.      Objective: See treatment diary below      Assessment: Tolerated treatment well. Patient with some tissue tone change along adductors but better then Monday. Educated on warm up tonight with rashid lungtamie.       Plan: Continue per plan of care.      Precautions: standard       Manuals         assessment    5' 5'        Knee flexion mob with IR    AB AB        Ankle TCJ mob G5 dist            STM medial HS/add   10'  AB-10'        Knee ext mob   G4          Hip IR MWM   3x10          Neuro Re-Ed             Standing hip ABD GTB  2x10 GMB  3x10 ea side           Knee ext machine 90-30 SL SL   20#  3x10           TKE BJB BJB  20x BTB  20x          STS   Alt stance - L leg back  3x8 L leg back  12x L leg back  2x10         Hops in place    3x20         Hops AP    3x10         lunges   Bosu  2x10 Fall to-2x10         Lunge @ counter   10x          education   10' 5'         Ther Ex             bike   5' L1 w/u                                                                                                     Ther Activity                                       Gait Training                                       Modalities

## 2025-06-09 ENCOUNTER — OFFICE VISIT (OUTPATIENT)
Dept: PHYSICAL THERAPY | Facility: REHABILITATION | Age: 70
End: 2025-06-09
Payer: MEDICARE

## 2025-06-09 DIAGNOSIS — M25.562 CHRONIC PAIN OF LEFT KNEE: Primary | ICD-10-CM

## 2025-06-09 DIAGNOSIS — G89.29 CHRONIC PAIN OF LEFT KNEE: Primary | ICD-10-CM

## 2025-06-09 PROCEDURE — 97112 NEUROMUSCULAR REEDUCATION: CPT | Performed by: PHYSICAL THERAPIST

## 2025-06-09 PROCEDURE — 97140 MANUAL THERAPY 1/> REGIONS: CPT | Performed by: PHYSICAL THERAPIST

## 2025-06-09 NOTE — PROGRESS NOTES
Daily Note     Today's date: 2025  Patient name: Fabby Rahman  : 1955  MRN: [id number]  Referring provider: Tuan Gutierrez, *  Dx:   Encounter Diagnosis     ICD-10-CM    1. Chronic pain of left knee  M25.562     G89.29           Start Time: 1445  Stop Time: 1530  Total time in clinic (min): 45 minutes    Subjective: Patient reports that her knee has felt pretty good. She played pickle ball last week end had felt good. Warm up went well as discussed in PT.       Objective: See treatment diary below    ERP knee flexion - improved post STM to popliteus and posterior TFJ mobs.       Assessment: Tolerated treatment well. Patient did well with all light plyometric activity. Worked in lunged with 10#, cues for form to allow for anterior tibial translation.       Plan: Continue per plan of care.      Precautions: standard       Manuals        assessment    5' 5'        Knee flexion mob with IR    AB AB        Ankle TCJ mob G5 dist            STM popliteus      AB-10'       STM medial HS/add   10'  AB-10'        Knee ext mob   G4          Hip IR MWM   3x10   With post glide-2x10       Neuro Re-Ed             Standing hip ABD GTB  2x10 GMB  3x10 ea side           Knee ext machine 90-30 SL SL   20#  3x10           TKE BJB BJB  20x BTB  20x          Alt hops in place      2x10       STS   Alt stance - L leg back  3x8 L leg back  12x L leg back  2x10         Hops in place    3x20  2x20       Hops lateral      20x       Hops AP    3x10  2x20       lunges   Bosu  2x10 Fall to-2x10  10x warm up  3x8  10#       Lunge @ counter   10x          education   10' 5'         Ther Ex             bike   5' L1 w/u                                                                                                     Ther Activity                                       Gait Training                                       Modalities

## 2025-06-16 ENCOUNTER — OFFICE VISIT (OUTPATIENT)
Dept: PHYSICAL THERAPY | Facility: REHABILITATION | Age: 70
End: 2025-06-16
Payer: MEDICARE

## 2025-06-16 DIAGNOSIS — G89.29 CHRONIC PAIN OF LEFT KNEE: Primary | ICD-10-CM

## 2025-06-16 DIAGNOSIS — M25.562 CHRONIC PAIN OF LEFT KNEE: Primary | ICD-10-CM

## 2025-06-16 PROCEDURE — 97140 MANUAL THERAPY 1/> REGIONS: CPT | Performed by: PHYSICAL THERAPIST

## 2025-06-16 PROCEDURE — 97112 NEUROMUSCULAR REEDUCATION: CPT | Performed by: PHYSICAL THERAPIST

## 2025-06-16 NOTE — PROGRESS NOTES
Daily Note     Today's date: 2025  Patient name: Fabby Rahman  : 1955  MRN: [id number]  Referring provider: Tuan Gutierrez, *  Dx:   Encounter Diagnosis     ICD-10-CM    1. Chronic pain of left knee  M25.562     G89.29           Start Time: 1400  Stop Time: 1445  Total time in clinic (min): 45 minutes    Subjective: Patient reports that she has been playing pickle and golfed, felt a little pain with pickle but not bad. After golfed, she drove about an hour and then after walking around noted increased pain with difficulty bending and WB.       Objective: See treatment diary below      Assessment: Tolerated treatment well. Patient with mild medial knee ERP with flexion, normal mobility. Mild stiffness into ext. Improve tone along posterior knee vs last week. Did well with strengthening as we held lunges due to soreness in ankle after last visit.       Plan: Continue per plan of care.      Precautions: standard       Manuals       assessment    5' 5'        Knee flexion mob with IR    AB AB        Ankle TCJ mob G5 dist            STM popliteus      AB-10' assessed      STM medial HS/add   10'  AB-10'  AB      Knee ext mob   G4    AB      Hip IR MWM   3x10   With post glide-2x10 With posterior glide  2x10      Neuro Re-Ed             Standing hip ABD GTB  2x10 GMB  3x10 ea side           Knee ext machine 90-30 SL SL   20#  3x10           TKE BJB BJB  20x BTB  20x          Alt hops in place      2x10       STS   Alt stance - L leg back  3x8 L leg back  12x L leg back  2x10   L leg back  15#  3x1      Hops in place    3x20  2x20 2x20      Hops lateral      20x 20x      Alt hops       20x      Hops AP    3x10  2x20 2x20      lunges   Bosu  2x10 Fall to-2x10  10x warm up  3x8  10# held      Lunge @ counter   10x          education   10' 5'         Ther Ex             bike   5' L1 w/u                                                                                                      Ther Activity                                       Gait Training                                       Modalities

## 2025-06-17 ENCOUNTER — RESULTS FOLLOW-UP (OUTPATIENT)
Dept: FAMILY MEDICINE CLINIC | Facility: CLINIC | Age: 70
End: 2025-06-17

## 2025-06-23 ENCOUNTER — OFFICE VISIT (OUTPATIENT)
Dept: PHYSICAL THERAPY | Facility: REHABILITATION | Age: 70
End: 2025-06-23
Payer: MEDICARE

## 2025-06-23 ENCOUNTER — APPOINTMENT (OUTPATIENT)
Dept: PHYSICAL THERAPY | Facility: REHABILITATION | Age: 70
End: 2025-06-23
Payer: MEDICARE

## 2025-06-23 DIAGNOSIS — G89.29 CHRONIC PAIN OF LEFT KNEE: Primary | ICD-10-CM

## 2025-06-23 DIAGNOSIS — M25.562 CHRONIC PAIN OF LEFT KNEE: Primary | ICD-10-CM

## 2025-06-23 PROCEDURE — 97140 MANUAL THERAPY 1/> REGIONS: CPT | Performed by: PHYSICAL THERAPIST

## 2025-06-23 PROCEDURE — 97110 THERAPEUTIC EXERCISES: CPT | Performed by: PHYSICAL THERAPIST

## 2025-06-23 NOTE — PROGRESS NOTES
Daily Note     Today's date: 2025  Patient name: Fabby Rahman  : 1955  MRN: [id number]  Referring provider: Tuan Gutierrez, *  Dx:   Encounter Diagnosis     ICD-10-CM    1. Chronic pain of left knee  M25.562     G89.29           Start Time: 0700  Stop Time: 0740  Total time in clinic (min): 40 minutes    Subjective: Patient reports doing well.       Objective: See treatment diary below      Assessment: Tolerated treatment well. Patient knee ext ROM improving into ext as noted with less pull in medial HS and adductors with TKE.       Plan: Continue per plan of care.      Precautions: standard       Manuals      assessment    5' 5'        Knee flexion mob with IR    AB AB        Ankle TCJ mob G5 dist            STM popliteus      AB-10' assessed AB     STM medial HS/add   10'  AB-10'  AB AB     Knee ext mob   G4    AB AB     Hip IR MWM   3x10   With post glide-2x10 With posterior glide  2x10      Neuro Re-Ed             Standing hip ABD GTB  2x10 GMB  3x10 ea side           Knee ext machine 90-30 SL SL   20#  3x10           TKE BJB BJB  20x BTB  20x          Alt hops in place      2x10       STS   Alt stance - L leg back  3x8 L leg back  12x L leg back  2x10   L leg back  15#  3x1      Hops in place    3x20  2x20 2x20      Hops lateral      20x 20x      Alt hops       20x      Hops AP    3x10  2x20 2x20      lunges   Bosu  2x10 Fall to-2x10  10x warm up  3x8  10# held      Lunge @ counter   10x          education   10' 5'         Ther Ex             bike   5' L1 w/u          education        5'                                                                                   Ther Activity                                       Gait Training                                       Modalities

## 2025-07-09 ENCOUNTER — OFFICE VISIT (OUTPATIENT)
Dept: PHYSICAL THERAPY | Facility: REHABILITATION | Age: 70
End: 2025-07-09
Payer: MEDICARE

## 2025-07-09 DIAGNOSIS — G89.29 CHRONIC PAIN OF LEFT KNEE: Primary | ICD-10-CM

## 2025-07-09 DIAGNOSIS — M25.562 CHRONIC PAIN OF LEFT KNEE: Primary | ICD-10-CM

## 2025-07-09 PROCEDURE — 97140 MANUAL THERAPY 1/> REGIONS: CPT | Performed by: PHYSICAL THERAPIST

## 2025-07-09 PROCEDURE — 97110 THERAPEUTIC EXERCISES: CPT | Performed by: PHYSICAL THERAPIST

## 2025-07-09 NOTE — PROGRESS NOTES
Daily Note     Today's date: 2025  Patient name: Fabby Rahman  : 1955  MRN: [id number]  Referring provider: Tuan Gutierrez, *  Dx:   Encounter Diagnosis     ICD-10-CM    1. Chronic pain of left knee  M25.562     G89.29           Start Time: 1255  Stop Time: 1335  Total time in clinic (min): 40 minutes    Subjective: Patient reports that her knee has overall been doing good with small twinges when playing pickleball, mainly with a pivot movement.       Objective: See treatment diary below      Assessment: Tolerated treatment well. Patient educated on REP knee ext in standing daily to address lasting deficit into ext (though improved). CS assessed. Patient to resume prior HEP with [employer] [place].      Plan: Continue per plan of care.      Precautions: standard       Manuals     CS assessment         20'    assessment    5' 5'        Knee flexion mob with IR    AB AB        Ankle TCJ mob G5 dist            STM popliteus      AB-10' assessed AB     STM medial HS/add   10'  AB-10'  AB AB     Knee ext mob   G4    AB AB AB    Hip IR MWM   3x10   With post glide-2x10 With posterior glide  2x10      Neuro Re-Ed             Standing hip ABD GTB  2x10 GMB  3x10 ea side           Knee ext machine 90-30 SL SL   20#  3x10           TKE BJB BJB  20x BTB  20x          Alt hops in place      2x10       STS   Alt stance - L leg back  3x8 L leg back  12x L leg back  2x10   L leg back  15#  3x1      Hops in place    3x20  2x20 2x20      Hops lateral      20x 20x      Alt hops       20x      Hops AP    3x10  2x20 2x20      lunges   Bosu  2x10 Fall to-2x10  10x warm up  3x8  10# held      Lunge @ counter   10x          education   10' 5'         Ther Ex             bike   5' L1 w/u          education        5'     SNAG edu         5'    REP knee ext         2x10                                                        Ther Activity                                       Gait  Training                                       Modalities

## 2025-07-16 ENCOUNTER — OFFICE VISIT (OUTPATIENT)
Dept: PHYSICAL THERAPY | Facility: REHABILITATION | Age: 70
End: 2025-07-16
Payer: MEDICARE

## 2025-07-16 DIAGNOSIS — M25.562 CHRONIC PAIN OF LEFT KNEE: Primary | ICD-10-CM

## 2025-07-16 DIAGNOSIS — G89.29 CHRONIC PAIN OF LEFT KNEE: Primary | ICD-10-CM

## 2025-07-16 PROCEDURE — 97140 MANUAL THERAPY 1/> REGIONS: CPT | Performed by: PHYSICAL THERAPIST

## 2025-07-16 PROCEDURE — 97110 THERAPEUTIC EXERCISES: CPT | Performed by: PHYSICAL THERAPIST

## 2025-07-16 NOTE — PROGRESS NOTES
Daily Note     Today's date: 2025  Patient name: Fabby Rahman  : 1955  MRN: [id number]  Referring provider: Tuan Gutierrez, *  Dx:   Encounter Diagnosis     ICD-10-CM    1. Chronic pain of left knee  M25.562     G89.29           Start Time: 1320  Stop Time: 1400  Total time in clinic (min): 40 minutes    Subjective: Patient reports that her knee has been feeling good. States that her neck continues to be more bothersome.       Objective: See treatment diary below      Assessment: Tolerated treatment well. Patient with a positive response to CS RET and ext in lying as she had improve motion (50-75%) R rot with reports of min pain as HEP was updated and normal responses were address to work this in.      Plan: Continue per plan of care.      Precautions: standard       Manuals    CS assessment         20' 25'   assessment    5' 5'        Knee flexion mob with IR    AB AB        Ankle TCJ mob G5 dist            STM popliteus      AB-10' assessed AB     STM medial HS/add   10'  AB-10'  AB AB     Knee ext mob   G4    AB AB AB    Hip IR MWM   3x10   With post glide-2x10 With posterior glide  2x10      Neuro Re-Ed             Standing hip ABD GTB  2x10 GMB  3x10 ea side           Knee ext machine 90-30 SL SL   20#  3x10           TKE BJB BJB  20x BTB  20x          Alt hops in place      2x10       STS   Alt stance - L leg back  3x8 L leg back  12x L leg back  2x10   L leg back  15#  3x1      Hops in place    3x20  2x20 2x20      Hops lateral      20x 20x      Alt hops       20x      Hops AP    3x10  2x20 2x20      lunges   Bosu  2x10 Fall to-2x10  10x warm up  3x8  10# held      Lunge @ counter   10x          education   10' 5'         Ther Ex             CS HEP          15'   bike   5' L1 w/u          education        5'     SNAG edu         5'    REP knee ext         2x10                                                        Ther Activity                                        Gait Training                                       Modalities

## 2025-07-17 ENCOUNTER — APPOINTMENT (OUTPATIENT)
Dept: PHYSICAL THERAPY | Facility: REHABILITATION | Age: 70
End: 2025-07-17
Payer: MEDICARE

## 2025-08-18 ENCOUNTER — OFFICE VISIT (OUTPATIENT)
Dept: PHYSICAL THERAPY | Facility: REHABILITATION | Age: 70
End: 2025-08-18
Payer: MEDICARE

## 2025-08-18 DIAGNOSIS — M25.562 CHRONIC PAIN OF LEFT KNEE: Primary | ICD-10-CM

## 2025-08-18 DIAGNOSIS — G89.29 CHRONIC PAIN OF LEFT KNEE: Primary | ICD-10-CM

## 2025-08-18 DIAGNOSIS — G89.29 CHRONIC NECK PAIN: ICD-10-CM

## 2025-08-18 DIAGNOSIS — M54.2 CHRONIC NECK PAIN: ICD-10-CM

## 2025-08-18 PROCEDURE — 97140 MANUAL THERAPY 1/> REGIONS: CPT | Performed by: PHYSICAL THERAPIST

## 2025-08-18 PROCEDURE — 97110 THERAPEUTIC EXERCISES: CPT | Performed by: PHYSICAL THERAPIST
